# Patient Record
Sex: FEMALE | Race: WHITE | NOT HISPANIC OR LATINO | Employment: OTHER | URBAN - METROPOLITAN AREA
[De-identification: names, ages, dates, MRNs, and addresses within clinical notes are randomized per-mention and may not be internally consistent; named-entity substitution may affect disease eponyms.]

---

## 2017-02-28 ENCOUNTER — OFFICE VISIT (OUTPATIENT)
Dept: NEUROLOGY | Facility: MEDICAL CENTER | Age: 57
End: 2017-02-28
Payer: COMMERCIAL

## 2017-02-28 VITALS
SYSTOLIC BLOOD PRESSURE: 108 MMHG | HEART RATE: 82 BPM | BODY MASS INDEX: 21.01 KG/M2 | OXYGEN SATURATION: 97 % | TEMPERATURE: 99.1 F | HEIGHT: 60 IN | WEIGHT: 107 LBS | DIASTOLIC BLOOD PRESSURE: 62 MMHG | RESPIRATION RATE: 16 BRPM

## 2017-02-28 DIAGNOSIS — R25.1 TREMOR OF UNKNOWN ORIGIN: Primary | ICD-10-CM

## 2017-02-28 PROCEDURE — 99215 OFFICE O/P EST HI 40 MIN: CPT | Performed by: PSYCHIATRY & NEUROLOGY

## 2017-02-28 RX ORDER — GABAPENTIN 100 MG/1
100 CAPSULE ORAL 2 TIMES DAILY
COMMUNITY
End: 2017-04-25

## 2017-02-28 NOTE — MR AVS SNAPSHOT
Mariela Huff   2017 4:20 PM   Office Visit   MRN: [id number]    Department:  [facility]   Dept Phone:  [phone]    Description:  Female : 1960   Provider:  Austyn Calvillo M.D.           Reason for Visit     Follow-Up tremor/ Previous pt of Dr Mueller      Allergies as of 2017     Allergen Noted Reactions    Pcn [Penicillins] 2010   Rash    Percocet [Oxycodone-Acetaminophen] 2010   Vomiting      Vital Signs     Blood Pressure Pulse Temperature Respirations Height Weight    108/62 mmHg 82 37.3 °C (99.1 °F) 16 1.524 m (5') 48.535 kg (107 lb)    Body Mass Index Oxygen Saturation Smoking Status             20.90 kg/m2 97% Never Smoker          Basic Information     Date Of Birth Sex Race Ethnicity Preferred Language    1960 Female White Non- English      Your appointments     Sep 05, 2017 10:00 AM   Follow Up Visit with Austyn Calvillo M.D.   [facility] (--)    [address] [place] [address], [address]  [facility] [id number] [phone]           You will be receiving a confirmation call a few days before your appointment from our automated call confirmation system.              Problem List              ICD-10-CM Priority Class Noted - Resolved    Encounter for cosmetic surgery Z41.1   2016 - Present      Health Maintenance        Date Due Completion Dates    IMM DTaP/Tdap/Td Vaccine (1 - Tdap) 10/30/1980 ---    PAP SMEAR 10/30/1982 ---    COLONOSCOPY 10/30/2011 ---    IMM INFLUENZA (1) 2016 ---    MAMMOGRAM 3/11/2017 3/11/2016, 2015, 3/6/2015, 2015, 2014, 2013, 2011, 2011, 2007, 2007            Current Immunizations     No immunizations on file.      Below and/or attached are the medications your provider expects you to take. Review all of your home medications and newly ordered medications with your provider and/or pharmacist. Follow medication instructions as directed by your provider  and/or pharmacist. Please keep your medication list with you and share with your provider. Update the information when medications are discontinued, doses are changed, or new medications (including over-the-counter products) are added; and carry medication information at all times in the event of emergency situations     Allergies:  PCN - Rash     PERCOCET - Vomiting               Medications  Valid as of: February 28, 2017 -  4:58 PM    Generic Name Brand Name Tablet Size Instructions for use    B Complex Vitamins   Take  by mouth every day.        Cefuroxime Axetil (Tab) CEFTIN 500 MG Take 500 mg by mouth 2 times a day.        Cholecalciferol (Cap) Vitamin D 2000 UNITS Take 2,000 Caps by mouth every day.        Cyanocobalamin (SL Tab) Vitamin B-12 5000 MCG Place  under tongue every day.        Gabapentin (Cap) NEURONTIN 100 MG Take 100 mg by mouth 2 Times a Day.        Glutathione (Tab) Glutathione 50 MG Take 250 mg by mouth 2 Times a Day.        HYDROmorphone HCl (Tab) DILAUDID 2 MG Take 2-4 mg by mouth every 6 hours as needed for Severe Pain.        Ibuprofen (Tab) MOTRIN 200 MG Take 400 mg by mouth every 6 hours as needed.        Levothyroxine Sodium (Tab) SYNTHROID 88 MCG Take 88 mcg by mouth Every morning on an empty stomach.        Magnesium (Cap) Magnesium 400 MG Take  by mouth every day.        NON SPECIFIED   2 Times a Day. Glutathione, OTC supplement        Nystatin (Tab) MYCOSTATIN 949421 UNIT Take 1 Tab by mouth 2 Times a Day.        Nystatin   Take 50,000 Units by mouth every day. Nebulizer treatment        Succimer (Powder) Succimer DMSA  by Does not apply route.        .                 Medicines prescribed today were sent to:     [facility] #[id number] - [place] - [address] [address]    Phone: [phone] Fax: [phone]    Open 24 Hours?: No      Medication refill instructions:       If your prescription bottle indicates you have medication refills left, it is not necessary  to call your provider’s office. Please contact your pharmacy and they will refill your medication.    If your prescription bottle indicates you do not have any refills left, you may request refills at any time through one of the following ways: The online [employer] system (except Urgent Care), by calling your provider’s office, or by asking your pharmacy to contact your provider’s office with a refill request. Medication refills are processed only during regular business hours and may not be available until the next business day. Your provider may request additional information or to have a follow-up visit with you prior to refilling your medication.   *Please Note: Medication refills are assigned a new Rx number when refilled electronically. Your pharmacy may indicate that no refills were authorized even though a new prescription for the same medication is available at the pharmacy. Please request the medicine by name with the pharmacy before contacting your provider for a refill.           Mendor Access Code: Activation code not generated  Current Mendor Status: Active

## 2017-03-01 ASSESSMENT — ENCOUNTER SYMPTOMS
TREMORS: 1
MEMORY LOSS: 0
HEADACHES: 0
CONSTIPATION: 0
TINGLING: 0
FOCAL WEAKNESS: 0

## 2017-03-22 ENCOUNTER — HOSPITAL ENCOUNTER (OUTPATIENT)
Dept: LAB | Facility: MEDICAL CENTER | Age: 57
End: 2017-03-22
Attending: FAMILY MEDICINE
Payer: COMMERCIAL

## 2017-03-22 LAB
ALBUMIN SERPL BCP-MCNC: 4.3 G/DL (ref 3.2–4.9)
ALBUMIN/GLOB SERPL: 1.3 G/DL
ALP SERPL-CCNC: 76 U/L (ref 30–99)
ALT SERPL-CCNC: 23 U/L (ref 2–50)
ANION GAP SERPL CALC-SCNC: 6 MMOL/L (ref 0–11.9)
AST SERPL-CCNC: 23 U/L (ref 12–45)
BASOPHILS # BLD AUTO: 0.05 K/UL (ref 0–0.12)
BASOPHILS NFR BLD AUTO: 0.8 % (ref 0–1.8)
BILIRUB SERPL-MCNC: 0.3 MG/DL (ref 0.1–1.5)
BUN SERPL-MCNC: 15 MG/DL (ref 8–22)
CALCIUM SERPL-MCNC: 9.5 MG/DL (ref 8.5–10.5)
CHLORIDE SERPL-SCNC: 102 MMOL/L (ref 96–112)
CO2 SERPL-SCNC: 32 MMOL/L (ref 20–33)
CREAT SERPL-MCNC: 0.96 MG/DL (ref 0.5–1.4)
EOSINOPHIL # BLD: 0.17 K/UL (ref 0–0.51)
EOSINOPHIL NFR BLD AUTO: 2.6 % (ref 0–6.9)
ERYTHROCYTE [DISTWIDTH] IN BLOOD BY AUTOMATED COUNT: 40.2 FL (ref 35.9–50)
GLOBULIN SER CALC-MCNC: 3.2 G/DL (ref 1.9–3.5)
GLUCOSE SERPL-MCNC: 92 MG/DL (ref 65–99)
HCT VFR BLD AUTO: 44.5 % (ref 37–47)
HGB BLD-MCNC: 15.3 G/DL (ref 12–16)
IMM GRANULOCYTES # BLD AUTO: 0.01 K/UL (ref 0–0.11)
IMM GRANULOCYTES NFR BLD AUTO: 0.2 % (ref 0–0.9)
LYMPHOCYTES # BLD: 1.58 K/UL (ref 1–4.8)
LYMPHOCYTES NFR BLD AUTO: 24.3 % (ref 22–41)
MCH RBC QN AUTO: 31.4 PG (ref 27–33)
MCHC RBC AUTO-ENTMCNC: 34.4 G/DL (ref 33.6–35)
MCV RBC AUTO: 91.4 FL (ref 81.4–97.8)
MONOCYTES # BLD: 0.51 K/UL (ref 0–0.85)
MONOCYTES NFR BLD AUTO: 7.8 % (ref 0–13.4)
NEUTROPHILS # BLD: 4.19 K/UL (ref 2–7.15)
NEUTROPHILS NFR BLD AUTO: 64.3 % (ref 44–72)
NRBC # BLD AUTO: 0 K/UL
NRBC BLD-RTO: 0 /100 WBC
PLATELET # BLD AUTO: 211 K/UL (ref 164–446)
PMV BLD AUTO: 10.5 FL (ref 9–12.9)
POTASSIUM SERPL-SCNC: 4.3 MMOL/L (ref 3.6–5.5)
PROT SERPL-MCNC: 7.5 G/DL (ref 6–8.2)
RBC # BLD AUTO: 4.87 M/UL (ref 4.2–5.4)
SODIUM SERPL-SCNC: 140 MMOL/L (ref 135–145)
WBC # BLD AUTO: 6.5 K/UL (ref 4.8–10.8)

## 2017-03-22 PROCEDURE — 80053 COMPREHEN METABOLIC PANEL: CPT

## 2017-03-22 PROCEDURE — 85025 COMPLETE CBC W/AUTO DIFF WBC: CPT

## 2017-03-22 PROCEDURE — 36415 COLL VENOUS BLD VENIPUNCTURE: CPT

## 2017-03-31 ENCOUNTER — HOSPITAL ENCOUNTER (OUTPATIENT)
Dept: RADIOLOGY | Facility: MEDICAL CENTER | Age: 57
End: 2017-03-31
Attending: OBSTETRICS & GYNECOLOGY
Payer: COMMERCIAL

## 2017-03-31 DIAGNOSIS — R92.8 ABNORMAL MAMMOGRAM, UNSPECIFIED: ICD-10-CM

## 2017-03-31 PROCEDURE — G0204 DX MAMMO INCL CAD BI: HCPCS

## 2017-03-31 PROCEDURE — 76642 ULTRASOUND BREAST LIMITED: CPT | Mod: LT

## 2017-04-25 ENCOUNTER — APPOINTMENT (OUTPATIENT)
Dept: ADMISSIONS | Facility: MEDICAL CENTER | Age: 57
End: 2017-04-25
Attending: ORTHOPAEDIC SURGERY
Payer: COMMERCIAL

## 2017-05-01 ENCOUNTER — APPOINTMENT (OUTPATIENT)
Dept: RADIOLOGY | Facility: MEDICAL CENTER | Age: 57
End: 2017-05-01
Attending: ORTHOPAEDIC SURGERY
Payer: COMMERCIAL

## 2017-05-01 ENCOUNTER — HOSPITAL ENCOUNTER (OUTPATIENT)
Facility: MEDICAL CENTER | Age: 57
End: 2017-05-01
Attending: ORTHOPAEDIC SURGERY | Admitting: ORTHOPAEDIC SURGERY
Payer: COMMERCIAL

## 2017-05-01 VITALS
BODY MASS INDEX: 20.6 KG/M2 | HEIGHT: 60 IN | SYSTOLIC BLOOD PRESSURE: 130 MMHG | DIASTOLIC BLOOD PRESSURE: 74 MMHG | TEMPERATURE: 98.4 F | HEART RATE: 77 BPM | RESPIRATION RATE: 18 BRPM | OXYGEN SATURATION: 94 % | WEIGHT: 104.94 LBS

## 2017-05-01 PROBLEM — M20.11 ACQUIRED HALLUX VALGUS OF RIGHT FOOT: Status: ACTIVE | Noted: 2017-05-01

## 2017-05-01 PROCEDURE — 88304 TISSUE EXAM BY PATHOLOGIST: CPT

## 2017-05-01 PROCEDURE — 501838 HCHG SUTURE GENERAL: Performed by: ORTHOPAEDIC SURGERY

## 2017-05-01 PROCEDURE — 160022 HCHG BLOCK: Performed by: ORTHOPAEDIC SURGERY

## 2017-05-01 PROCEDURE — 500881 HCHG PACK, EXTREMITY: Performed by: ORTHOPAEDIC SURGERY

## 2017-05-01 PROCEDURE — 160002 HCHG RECOVERY MINUTES (STAT): Performed by: ORTHOPAEDIC SURGERY

## 2017-05-01 PROCEDURE — C1713 ANCHOR/SCREW BN/BN,TIS/BN: HCPCS | Performed by: ORTHOPAEDIC SURGERY

## 2017-05-01 PROCEDURE — A6223 GAUZE >16<=48 NO W/SAL W/O B: HCPCS | Performed by: ORTHOPAEDIC SURGERY

## 2017-05-01 PROCEDURE — 160035 HCHG PACU - 1ST 60 MINS PHASE I: Performed by: ORTHOPAEDIC SURGERY

## 2017-05-01 PROCEDURE — A9270 NON-COVERED ITEM OR SERVICE: HCPCS

## 2017-05-01 PROCEDURE — 160047 HCHG PACU  - EA ADDL 30 MINS PHASE II: Performed by: ORTHOPAEDIC SURGERY

## 2017-05-01 PROCEDURE — 500053 HCHG BANDAGE, ELASTIC 4: Performed by: ORTHOPAEDIC SURGERY

## 2017-05-01 PROCEDURE — 160025 RECOVERY II MINUTES (STATS): Performed by: ORTHOPAEDIC SURGERY

## 2017-05-01 PROCEDURE — 160028 HCHG SURGERY MINUTES - 1ST 30 MINS LEVEL 3: Performed by: ORTHOPAEDIC SURGERY

## 2017-05-01 PROCEDURE — 160039 HCHG SURGERY MINUTES - EA ADDL 1 MIN LEVEL 3: Performed by: ORTHOPAEDIC SURGERY

## 2017-05-01 PROCEDURE — 160048 HCHG OR STATISTICAL LEVEL 1-5: Performed by: ORTHOPAEDIC SURGERY

## 2017-05-01 PROCEDURE — 160046 HCHG PACU - 1ST 60 MINS PHASE II: Performed by: ORTHOPAEDIC SURGERY

## 2017-05-01 PROCEDURE — 700102 HCHG RX REV CODE 250 W/ 637 OVERRIDE(OP)

## 2017-05-01 PROCEDURE — 700111 HCHG RX REV CODE 636 W/ 250 OVERRIDE (IP)

## 2017-05-01 PROCEDURE — A6222 GAUZE <=16 IN NO W/SAL W/O B: HCPCS | Performed by: ORTHOPAEDIC SURGERY

## 2017-05-01 PROCEDURE — 501480 HCHG STOCKINETTE: Performed by: ORTHOPAEDIC SURGERY

## 2017-05-01 PROCEDURE — 502240 HCHG MISC OR SUPPLY RC 0272: Performed by: ORTHOPAEDIC SURGERY

## 2017-05-01 PROCEDURE — 500423 HCHG DRESSING, ABD COMBINE: Performed by: ORTHOPAEDIC SURGERY

## 2017-05-01 PROCEDURE — 160036 HCHG PACU - EA ADDL 30 MINS PHASE I: Performed by: ORTHOPAEDIC SURGERY

## 2017-05-01 PROCEDURE — 700101 HCHG RX REV CODE 250

## 2017-05-01 PROCEDURE — 160009 HCHG ANES TIME/MIN: Performed by: ORTHOPAEDIC SURGERY

## 2017-05-01 PROCEDURE — 73620 X-RAY EXAM OF FOOT: CPT | Mod: RT

## 2017-05-01 DEVICE — IMPLANTABLE DEVICE: Type: IMPLANTABLE DEVICE | Status: FUNCTIONAL

## 2017-05-01 RX ORDER — MAGNESIUM HYDROXIDE 1200 MG/15ML
LIQUID ORAL
Status: DISCONTINUED | OUTPATIENT
Start: 2017-05-01 | End: 2017-05-01 | Stop reason: HOSPADM

## 2017-05-01 RX ORDER — LIDOCAINE HYDROCHLORIDE 10 MG/ML
INJECTION, SOLUTION INFILTRATION; PERINEURAL
Status: COMPLETED
Start: 2017-05-01 | End: 2017-05-01

## 2017-05-01 RX ORDER — SCOLOPAMINE TRANSDERMAL SYSTEM 1 MG/1
PATCH, EXTENDED RELEASE TRANSDERMAL
Status: COMPLETED
Start: 2017-05-01 | End: 2017-05-01

## 2017-05-01 RX ORDER — SODIUM CHLORIDE, SODIUM LACTATE, POTASSIUM CHLORIDE, CALCIUM CHLORIDE 600; 310; 30; 20 MG/100ML; MG/100ML; MG/100ML; MG/100ML
1000 INJECTION, SOLUTION INTRAVENOUS
Status: DISCONTINUED | OUTPATIENT
Start: 2017-05-01 | End: 2017-05-01 | Stop reason: HOSPADM

## 2017-05-01 RX ADMIN — SCOPALAMINE 1 PATCH: 1 PATCH, EXTENDED RELEASE TRANSDERMAL at 07:30

## 2017-05-01 RX ADMIN — HYDROCODONE BITARTRATE AND ACETAMINOPHEN 15 ML: 2.5; 108 SOLUTION ORAL at 09:20

## 2017-05-01 ASSESSMENT — PAIN SCALES - GENERAL
PAINLEVEL_OUTOF10: 0
PAINLEVEL_OUTOF10: 3
PAINLEVEL_OUTOF10: 0
PAINLEVEL_OUTOF10: 1
PAINLEVEL_OUTOF10: 0
PAINLEVEL_OUTOF10: 3
PAINLEVEL_OUTOF10: 3

## 2017-05-01 NOTE — OP REPORT
DATE OF SERVICE:  05/01/2017    DATE OF OPERATION:  05/01/2017    PREOPERATIVE DIAGNOSES:  1.  Right hallux valgus deformity.  2.  Right second webspace neuroma.    POSTOPERATIVE DIAGNOSES:  1.  Right hallux valgus deformity.  2.  Right second webspace neuroma.    PROCEDURES PERFORMED:  1.  Right proximal hallux valgus reconstruction with bone grafting.  2.  Right second webspace neuroma excision.    SURGEON:  Janes Benaviedz MD    FIRST ASSISTANT:  Helena Marquez MD    SECOND ASSISTANT:  Nara Macedo    ANESTHESIA:  General endotracheal with popliteal block per my request for   postoperative pain management.    ESTIMATED BLOOD LOSS:  None.    COMPLICATIONS:  None.    POSTOPERATIVE PLAN:  1.  Heel-touch weightbearing.  2.  Perioperative antibiotics.  3.  Follow up in 2 weeks.    INDICATIONS:  The patient is a pleasant 56-year-old female who has had   problems with her right foot.  The above diagnosis made and options were   discussed including operative and nonoperative.  She elected to undergo   operative intervention.  The above procedure discussed and all questions were   answered.  Risks of surgery were explained to include but not limited to wound   problems, infection, nerve injury, vascular injury, need for further surgery.    She understands she could have persistent risk for pain, malunion, nonunion,   joint stiffness, overcorrection, under correction, recurrence, and need for   further surgery.  She understands and accepts these risks and agrees to   proceed.  Her site was marked by myself prior to receiving psychotropic   medicines.    PROCEDURE IN DETAIL:  The patient was given a popliteal block per my request   for postoperative pain management.  She was brought to the operating room and   underwent general endotracheal anesthesia without complications.  Right lower   extremity was prepped and draped in standard fashion.  Positive site   verification confirmed her right lower extremity as well  as the above   procedure and confirmation that she received preoperative antibiotics.    Esmarch was used to exsanguinate her foot and ankle and leg, and tourniquet   was inflated to 250 mmHg.  A dorsal incision was made over the first webspace   just lateral to the first TMT joint.  It was dissected down along the lateral   capsule.  Using a 15 blade, the metatarsal sesamoid ligament was released   followed by the abductor tendon off of the fibular sesamoid.  [place] was placed   just deep to the intermetatarsal ligament.  This was released protecting the   underlying neurovascular structures.  Taking the toe into varus, I went into   approximately 5 degrees of varus easily.  Next, an incision was made over the   medial eminence.  It was dissected down to the capsule.  Careful dissection   was made dorsal and plantar.  A wedge of capsule was taken off the level of   the first TMT on the medial side.  The medial capsule was then carefully   elevated off.  A microsagittal saw was used to remove the medial eminence.    Dissection was then made proximal to the level of the proximal aspect of the   metatarsal where supraperiosteal dissection was made dorsal and plantar.    Under C-arm imaging, an osteotomy was made with a 0.38 blade parallel to the   first TMT joint going to, but not through the lateral cortex.  This was then   opened up with a distractor.  Under C-arm imaging, the intermetatarsal angle   was neutralized, it was measured and a 6 mm Arthrex opening wedge plate was   placed.  It was intentionally placed on the right foot to avoid prominence of   the plate.  It was then transfixed with 2 screws proximal and distal.  Stab   incision was made over the calcaneus laterally just posterior to the peroneal   tendons, dissected down to the level of the calcaneus.  Using a 4.5 drill   sleeve, multiple cores of bone were taken and these were placed into the   opening wedge side of the first metatarsal.  Capsule was  then repaired with   multiple 0 Vicryls in a mattress-type fashion.  C-arm imaging demonstrates   satisfactory position for internal fixation, neutralization of the hallux   valgus in the intermetatarsal angle, and the sesamoids were reduced underneath   the metatarsal head.  Next, a dorsal incision was made over the second   webspace.  It was dissected down.  A freer was placed just deep to the   intermetatarsal ligament, which was released.  This identified the neuroma.    This was traced as proximal as possible and cut well into the intrinsics, then   traced distally where it was also cut.  Wounds were irrigated with copious   irrigation and closed in layered fashion using 3-0 Vicryl and 3-0 nylon.    Sterile dressings were applied.  Tourniquet was released.  All toes were pink.    She was placed in a standard bunion dressing.  She was transferred to the   recovery room in good condition.    Utilization of Dr. Marquez was necessary for patient positioning, holding,   retracting, provisional and definitive fixation, wound closure, dressing   placement.  She was present throughout the entire procedure.       ____________________________________     MD GRAEME HOOPER / ANA    DD:  05/01/2017 08:44:24  DT:  05/01/2017 10:43:04    D#:  [id number]  Job#:  [id number]    cc: [facility]

## 2017-05-01 NOTE — OR NURSING
IV attempt X1 unsuccessful. Pt requests anesthesiologist to start IV.  Will notify Dr. Naidu upon his arrival.

## 2017-05-01 NOTE — DISCHARGE INSTRUCTIONS
ACTIVITY: Rest and take it easy for the first 24 hours.  A responsible adult is recommended to remain with you during that time.  It is normal to feel sleepy.  We encourage you to not do anything that requires balance, judgment or coordination.    MILD FLU-LIKE SYMPTOMS ARE NORMAL. YOU MAY EXPERIENCE GENERALIZED MUSCLE ACHES, THROAT IRRITATION, HEADACHE AND/OR SOME NAUSEA.    FOR 24 HOURS DO NOT:  Drive, operate machinery or run household appliances.  Drink beer or alcoholic beverages.   Make important decisions or sign legal documents.    SPECIAL INSTRUCTIONS: *Keep dressing clean, dry and in place. **    DIET: To avoid nausea, slowly advance diet as tolerated, avoiding spicy or greasy foods for the first day.  Add more substantial food to your diet according to your physician's instructions.  Babies can be fed formula or breast milk as soon as they are hungry.  INCREASE FLUIDS AND FIBER TO AVOID CONSTIPATION.    SURGICAL DRESSING/BATHING: *    Take pain medications scheduled until block wears off.  Hold for sedation.  Elevate foot, apply ice regularly (20min on, 20min off)  Rewrap ace to be looser, if needed, 6-8 hours post operatively (after 4pm)  Heel Weight Bearing in post op shoe       FOLLOW-UP APPOINTMENT:  A follow-up appointment should be arranged with your doctor in *7-10 days or as directed by your doctor**; call to schedule.    You should CALL YOUR PHYSICIAN if you develop:  Fever greater than 101 degrees F.  Pain not relieved by medication, or persistent nausea or vomiting.  Excessive bleeding (blood soaking through dressing) or unexpected drainage from the wound.  Extreme redness or swelling around the incision site, drainage of pus or foul smelling drainage.  Inability to urinate or empty your bladder within 8 hours.  Problems with breathing or chest pain.    You should call 911 if you develop problems with breathing or chest pain.  If you are unable to contact your doctor or surgical center, you  should go to the nearest emergency room or urgent care center.  Physician's telephone #: *Dr. Benavidez [phone]*    If any questions arise, call your doctor.  If your doctor is not available, please feel free to call the Surgical Center at [phone].  The Center is open Monday through Friday from 7AM to 7PM.  You can also call the HEALTH HOTLINE open 24 hours/day, 7 days/week and speak to a nurse at [phone], or toll free at [phone].    A registered nurse may call you a few days after your surgery to see how you are doing after your procedure.    MEDICATIONS: Resume taking daily medication.  Take prescribed pain medication with food.  If no medication is prescribed, you may take non-aspirin pain medication if needed.  PAIN MEDICATION CAN BE VERY CONSTIPATING.  Take a stool softener or laxative such as senokot, pericolace, or milk of magnesia if needed.    Prescription given *to *.  Last oral pain medication given at *9:20am*.    If your physician has prescribed pain medication that includes Acetaminophen (Tylenol), do not take additional Acetaminophen (Tylenol) while taking the prescribed medication.    Depression / Suicide Risk    As you are discharged from this [facility] facility, it is important to learn how to keep safe from harming yourself.    Recognize the warning signs:  · Abrupt changes in personality, positive or negative- including increase in energy   · Giving away possessions  · Change in eating patterns- significant weight changes-  positive or negative  · Change in sleeping patterns- unable to sleep or sleeping all the time   · Unwillingness or inability to communicate  · Depression  · Unusual sadness, discouragement and loneliness  · Talk of wanting to die  · Neglect of personal appearance   · Rebelliousness- reckless behavior  · Withdrawal from people/activities they love  · Confusion- inability to concentrate     If you or a loved one observes any of these behaviors  or has concerns about self-harm, here's what you can do:  · Talk about it- your feelings and reasons for harming yourself  · Remove any means that you might use to hurt yourself (examples: pills, rope, extension cords, firearm)  · Get professional help from the community (Mental Health, Substance Abuse, psychological counseling)  · Do not be alone:Call your Safe Contact- someone whom you trust who will be there for you.  · Call your local CRISIS HOTLINE 352-2857 or 994-364-2330  · Call your local Children's Mobile Crisis Response Team [place] (292) 744-0673 or www.[employer]  · Call the toll free National Suicide Prevention Hotlines   · National Suicide Prevention Lifeline 115-043-WKSI (0892)  · National Hope Line Network 800-SUICIDE (225-4727)

## 2017-05-01 NOTE — IP AVS SNAPSHOT
5/1/2017    Mariela Huff  [address]  [place] [address]    Dear Mariela:    [facility] wants to ensure your discharge home is safe and you or your loved ones have had all of your questions answered regarding your care after you leave the hospital.    Below is a list of resources and contact information should you have any questions regarding your hospital stay, follow-up instructions, or active medical symptoms.    Questions or Concerns Regarding… Contact   Medical Questions Related to Your Discharge  (7 days a week, 8am-5pm) Contact a Nurse Care Coordinator   [phone]   Medical Questions Not Related to Your Discharge  (24 hours a day / 7 days a week)  Contact the Nurse Health Line   [phone]    Medications or Discharge Instructions Refer to your discharge packet   or contact your [facility] Primary Care Provider   [phone]   Follow-up Appointment(s) Schedule your appointment via [employer]   or contact Scheduling [phone]   Billing Review your statement via [employer]  or contact Billing [phone]   Medical Records Review your records via [employer]   or contact Medical Records [phone]     You may receive a telephone call within two days of discharge. This call is to make certain you understand your discharge instructions and have the opportunity to have any questions answered. You can also easily access your medical information, test results and upcoming appointments via the [employer] free online health management tool. You can learn more and sign up at [employer]. For assistance setting up your [employer] account, please call [phone].    Once again, we want to ensure your discharge home is safe and that you have a clear understanding of any next steps in your care. If you have any questions or concerns, please do not hesitate to contact us, we are here for you. Thank you for choosing [facility] for your healthcare needs.    Sincerely,    Your [facility] Healthcare Team

## 2017-05-01 NOTE — IP AVS SNAPSHOT
Home Care Instructions                                                                                                                Name:Mariela Huff  Medical Record Number:[id number]  CSN: [id number]    YOB: 1960   Age: 56 y.o.  Sex: female  HT:1.524 m (5') WT: 47.6 kg (104 lb 15 oz)          Admit Date: 5/1/2017     Discharge Date:   Today's Date: 5/1/2017  Attending Doctor:  Janes Benavidez M.D.                  Allergies:  Pcn and Percocet              Follow-up Information     1. Follow up with Janes Benavidez M.D. In 2 weeks.    Specialty:  Orthopaedics    Contact information    [address]  [address]  [place] [address]  [phone]          Discharge Instructions         ACTIVITY: Rest and take it easy for the first 24 hours.  A responsible adult is recommended to remain with you during that time.  It is normal to feel sleepy.  We encourage you to not do anything that requires balance, judgment or coordination.    MILD FLU-LIKE SYMPTOMS ARE NORMAL. YOU MAY EXPERIENCE GENERALIZED MUSCLE ACHES, THROAT IRRITATION, HEADACHE AND/OR SOME NAUSEA.    FOR 24 HOURS DO NOT:  Drive, operate machinery or run household appliances.  Drink beer or alcoholic beverages.   Make important decisions or sign legal documents.    SPECIAL INSTRUCTIONS: *Keep dressing clean, dry and in place. **    DIET: To avoid nausea, slowly advance diet as tolerated, avoiding spicy or greasy foods for the first day.  Add more substantial food to your diet according to your physician's instructions.  Babies can be fed formula or breast milk as soon as they are hungry.  INCREASE FLUIDS AND FIBER TO AVOID CONSTIPATION.    SURGICAL DRESSING/BATHING: *    Take pain medications scheduled until block wears off.  Hold for sedation.  Elevate foot, apply ice regularly (20min on, 20min off)  Rewrap ace to be looser, if needed, 6-8 hours post operatively (after 4pm)  Heel Weight Bearing in post op shoe       FOLLOW-UP APPOINTMENT:  A  follow-up appointment should be arranged with your doctor in *7-10 days or as directed by your doctor**; call to schedule.    You should CALL YOUR PHYSICIAN if you develop:  Fever greater than 101 degrees F.  Pain not relieved by medication, or persistent nausea or vomiting.  Excessive bleeding (blood soaking through dressing) or unexpected drainage from the wound.  Extreme redness or swelling around the incision site, drainage of pus or foul smelling drainage.  Inability to urinate or empty your bladder within 8 hours.  Problems with breathing or chest pain.    You should call 911 if you develop problems with breathing or chest pain.  If you are unable to contact your doctor or surgical center, you should go to the nearest emergency room or urgent care center.  Physician's telephone #: *Dr. Benavidez [phone]*    If any questions arise, call your doctor.  If your doctor is not available, please feel free to call the Surgical Center at [phone].  The Center is open Monday through Friday from 7AM to 7PM.  You can also call the [facility] HOTLINE open 24 hours/day, 7 days/week and speak to a nurse at [phone], or toll free at [phone].    A registered nurse may call you a few days after your surgery to see how you are doing after your procedure.    MEDICATIONS: Resume taking daily medication.  Take prescribed pain medication with food.  If no medication is prescribed, you may take non-aspirin pain medication if needed.  PAIN MEDICATION CAN BE VERY CONSTIPATING.  Take a stool softener or laxative such as senokot, pericolace, or milk of magnesia if needed.    Prescription given *to *.  Last oral pain medication given at *9:20am*.    If your physician has prescribed pain medication that includes Acetaminophen (Tylenol), do not take additional Acetaminophen (Tylenol) while taking the prescribed medication.    Depression / Suicide Risk    As you are discharged from this Critical access hospital facility, it is  important to learn how to keep safe from harming yourself.    Recognize the warning signs:  · Abrupt changes in personality, positive or negative- including increase in energy   · Giving away possessions  · Change in eating patterns- significant weight changes-  positive or negative  · Change in sleeping patterns- unable to sleep or sleeping all the time   · Unwillingness or inability to communicate  · Depression  · Unusual sadness, discouragement and loneliness  · Talk of wanting to die  · Neglect of personal appearance   · Rebelliousness- reckless behavior  · Withdrawal from people/activities they love  · Confusion- inability to concentrate     If you or a loved one observes any of these behaviors or has concerns about self-harm, here's what you can do:  · Talk about it- your feelings and reasons for harming yourself  · Remove any means that you might use to hurt yourself (examples: pills, rope, extension cords, firearm)  · Get professional help from the community (Mental Health, Substance Abuse, psychological counseling)  · Do not be alone:Call your Safe Contact- someone whom you trust who will be there for you.  · Call your local CRISIS HOTLINE 426-6034 or 694-196-7946  · Call your local Children's Mobile Crisis Response Team [place] (021) 788-0513 or www.[employer]  · Call the toll free National Suicide Prevention Hotlines   · National Suicide Prevention Lifeline 642-443-JICZ (5861)  · National Hope Line Network 800-SUICIDE (440-5008)       Medication List      CONTINUE taking these medications        Instructions    Morning Afternoon Evening Bedtime    levothyroxine 88 MCG Tabs   Commonly known as:  SYNTHROID        Take 88 mcg by mouth Every morning on an empty stomach.   Dose:  88 mcg                        Succimer DMSA Powd        Take 3 Caps by mouth See Admin Instructions. Three days on, eleven days off.   Dose:  3 Cap                                Medication Information     Above and/or  attached are the medications your physician expects you to take upon discharge. Review all of your home medications and newly ordered medications with your doctor and/or pharmacist. Follow medication instructions as directed by your doctor and/or pharmacist. Please keep your medication list with you and share with your physician. Update the information when medications are discontinued, doses are changed, or new medications (including over-the-counter products) are added; and carry medication information at all times in the event of emergency situations.        Resources     Quit Smoking / Tobacco Use:    I understand the use of any tobacco products increases my chance of suffering from future heart disease or stroke and could cause other illnesses which may shorten my life. Quitting the use of tobacco products is the single most important thing I can do to improve my health. For further information on smoking / tobacco cessation call a Toll Free Quit Line at 1-671.346.6085 (*National Cancer Ethel) or 1-810.737.4344 (American Lung Association) or you can access the web based program at www.lungBorqs.org.    [place] Tobacco Users Help Line:  (700) 792-9534       Toll Free: 1-976.257.8576  Quit Tobacco Program [facility] (911)552-1380    Crisis Hotline:    [place] Crisis Hotline:  2-546-DDSGICK or 1-869.104.2404    [place] Crisis Hotline:    1-489.992.9368 or 241-385-7952    Discharge Survey:   Thank you for choosing [facility]. We hope we did everything we could to make your hospital stay a pleasant one. You may be receiving a survey and we would appreciate your time and participation in answering the questions. Your input is very valuable to us in our efforts to improve our service to our patients and their families.            Signatures     My signature on this form indicates that:    1. I acknowledge receipt and understanding of these Home Care Instruction.  2. My questions regarding  this information have been answered to my satisfaction.  3. I have formulated a plan with my discharge nurse to obtain my prescribed medications for home.    __________________________________      __________________________________                   Patient Signature                                 Guardian/Responsible Adult Signature      __________________________________                 __________       ________                       Nurse Signature                                               Date                 Time

## 2017-09-05 ENCOUNTER — OFFICE VISIT (OUTPATIENT)
Dept: NEUROLOGY | Facility: MEDICAL CENTER | Age: 57
End: 2017-09-05
Payer: COMMERCIAL

## 2017-09-05 VITALS
SYSTOLIC BLOOD PRESSURE: 118 MMHG | WEIGHT: 106.6 LBS | TEMPERATURE: 98.2 F | OXYGEN SATURATION: 97 % | BODY MASS INDEX: 20.93 KG/M2 | DIASTOLIC BLOOD PRESSURE: 72 MMHG | HEART RATE: 78 BPM | HEIGHT: 60 IN | RESPIRATION RATE: 17 BRPM

## 2017-09-05 DIAGNOSIS — R25.1 TREMOR OF UNKNOWN ORIGIN: ICD-10-CM

## 2017-09-05 PROCEDURE — 99214 OFFICE O/P EST MOD 30 MIN: CPT | Performed by: PSYCHIATRY & NEUROLOGY

## 2017-09-05 ASSESSMENT — PATIENT HEALTH QUESTIONNAIRE - PHQ9: CLINICAL INTERPRETATION OF PHQ2 SCORE: 0

## 2017-09-05 ASSESSMENT — ENCOUNTER SYMPTOMS
TREMORS: 1
FALLS: 0
HEADACHES: 0
MEMORY LOSS: 0

## 2017-09-05 ASSESSMENT — PAIN SCALES - GENERAL: PAINLEVEL: NO PAIN

## 2017-09-05 NOTE — PROGRESS NOTES
Subjective:      Mariela Huff is a 56 y.o. female who presents For follow-up with a history of a mixed tremor.    HPI    Since last seen, we had not treated the tremulousness, simply observing. It is still for the most part right-sided, she notes difficulty with the hand, a decrement in hand writing. The tremulousness seems to be controllable if she grasps the object firmly enough, fine motor control still most problematic. When she walks the right foot seems to be more flat-footed, she denies an actual foot slap when she does so. The longer she walks, the right foot seems to cramp as well. Constipation is not an issue, she denies loss of smell. I movements are intact. She is not drooling. She swallows easily.     She does describe symptoms of RLS in the legs, right more than left. Always worse in the evenings, especially when she is immobile, these seem to improve when she stands and walks or simply moves the right lower extremity. She is been suffering from these symptoms for a while, they fluctuate.    There is a distant family history and a second degree relative of tremor, no one in the family with a history of neurodegenerative disease. Other than some Chinese and homeopathic medications, she takes Synthroid. Medical, surgical, and family histories are otherwise reviewed, there are no new drug allergies.    Review of Systems   Constitutional: Negative for malaise/fatigue.   Musculoskeletal: Negative for falls.   Neurological: Positive for tremors. Negative for headaches.   Psychiatric/Behavioral: Negative for memory loss.        Objective:     /72   Pulse 78   Temp 36.8 °C (98.2 °F)   Resp 17   Ht 1.524 m (5')   Wt 48.4 kg (106 lb 9.6 oz)   SpO2 97%   BMI 20.82 kg/m²      Physical Exam    She appears in no acute distress. Vital signs are stable. There is no malar rash, the neck is supple, range of motion is full. There is no sialorrhea or evidence of excessive drooling. Cardiac  evaluation is unremarkable. There is no lower extremity edema.    She is fully oriented, there is no aphasia or inattention. There is a mild reduction in eye blink frequency, right side of the face droops slightly, there may be a little hypophonia, certainly no dysarthria or tachyphemia. There is no tremulousness of the head, neck, or voice. Fine low amplitude high-frequency tremulousness is seen on the right side, there is rigidity still and bradykinesia with the right upper and lower extremity. There is no appendicular dystaxia. Repetitive movements with both hand show diminished amplitude, again asymmetrically represented with the right. When she walks, stride length is not diminished, there is some stiffness with ankle dorsiflexion on the right, but she does not shuffle. Arm swing on the right is slightly diminished when compared to the left.     Assessment/Plan:     1. Tremor of unknown origin  Still a mixed bag, I am suspicious this may turn out to be Parkinson's disease, she lacks the history and associated symptoms suggestive of a Parkinson's-plus syndrome. Essential tremor also could be an issue. She is thinking about a referral to movement disorder center, I certainly have no objection to making that. The RLS symptoms are an interesting comorbidity, often seen in people with Parkinson's disease. The same goes for an essential tremor. In either case, the same medicines used for RLS such as dopamine agonists, certainly can be effective in Parkinson's disease. I will follow-up with her in one year, she will let me know if she wants a referral or if she would like to try some medication. She understands that a positive response to these medicines is always supportive in establishing Parkinson's disease or a Parkinson's-plus syndrome, the latter often responding when the disease is milder in severity.    Time: Evaluation of 35 minutes for exam, review, discussion, and education  Discussion: As mentioned in the  assessment, over 60% of the time spent face-to-face counseling and coordinating care

## 2017-09-11 ENCOUNTER — TELEPHONE (OUTPATIENT)
Dept: NEUROLOGY | Facility: MEDICAL CENTER | Age: 57
End: 2017-09-11

## 2017-09-11 DIAGNOSIS — R25.1 TREMOR OF UNKNOWN ORIGIN: ICD-10-CM

## 2017-09-11 NOTE — TELEPHONE ENCOUNTER
Patient called for Dr. Calvillo and would like to proceed with the referral to the [facility].

## 2018-01-16 ENCOUNTER — TELEPHONE (OUTPATIENT)
Dept: NEUROLOGY | Facility: MEDICAL CENTER | Age: 58
End: 2018-01-16

## 2018-01-17 NOTE — PROGRESS NOTES
Patient is resting comfortably.   Subjective:      Mariela Huff is a 56 y.o. female who presents for follow-up, former patient of Dr. Mueller, originally seeing him for diffuse pains and dysesthesias, now with tremulousness.    HPI    Ms. Huff has actually had a good response with her primary complaints when seen by Dr. Mueller, diagnosed with Lyme disease, treated appropriately. She still only has some minimal skin sensitivity, she notes with cold temperature, she feels even more chilled than others. She denies blanching of fingertips or toes consistent with Raynaud's phenomena. There is no actual pain.    She has noted tremulousness in both the right hand and lower extremity developing more slowly and indolently. She seems to be more aware of this when she is using the extremity, she has not noted much at rest. Her balance is not curtailed. If she sits in certain positions, such as with the right leg crossed over the left, the extremity also feels tremulous. She denies any involvement of the head, neck, or voice, there is no internalized sense of tremulousness. With this, she notes her handwriting is getting smaller, not necessarily sloppier. It is slower to use the hand when writing. She denies overall loss of strength or dexterity with the right hand and arm.    She denies any meaningful cognitive deficits, she can still multitask, mental processing is not impaired. There is no change in voice volume or quality, there are no issues with swallowing or excessive salivation and drooling. There are no sleep issues, she denies any problems with bad nightmares, awakenings with associated anxiety, etc. Strength is intact otherwise. Movements in general are still easy and fluid. Walking is not impaired, the right leg does not drag, stride length is not diminished. There are no issues with loss of taste or smell or excessive constipation. Energy level in general is quite good. She drinks occasionally, has not noted any effect on the tremor one  way or the other.    Medical, surgical and family histories are reviewed in the electronic health record, there are no new drug allergies. Her father's mother did suffer from tremor. No one else in the family that she is aware of has suffered from neurodegenerative disease, myelinating disease or seizures. Other than her Synthroid, she was placed on Neurontin 100 mg, twice a day, for her nerve pain symptoms related to Lyme, this has had no effect on the tremor. She is also on several vitamin supplements including B complex, vitamin D and calcium.    Review of Systems   Constitutional: Positive for malaise/fatigue.   Gastrointestinal: Negative for constipation.   Genitourinary: Negative for frequency.   Neurological: Positive for tremors. Negative for tingling, focal weakness and headaches.   Psychiatric/Behavioral: Negative for memory loss.   All other systems reviewed and are negative.       Objective:     /62 mmHg  Pulse 82  Temp(Src) 37.3 °C (99.1 °F)  Resp 16  Ht 1.524 m (5')  Wt 48.535 kg (107 lb)  BMI 20.90 kg/m2  SpO2 97%     Physical Exam    She appears in no acute distress. Her vital signs are stable. There is no malar rash, jaw or temporal tenderness, or sialorrhea. The neck is supple. Cardiac evaluation reveals a regular rhythm.    She is fully oriented, there is no aphasia, apraxia, or inattention. PERRLA/EOMI, visual fields are full to finger counting, facial movements are symmetric, they see maybe a little slower on the right side. Spontaneous eye blink frequency is slightly diminished, there is a mild reduction in voice volume, spontaneous facial movements overall are diminished. There is no sensory loss across the midline, the jaw jerk is absent, the tongue and uvula are midline.    Motor exam does reveal some bradykinesia and even hypokinesia with movements using the right arm, hand and the right lower extremity. There is some mild rigidity, only with distraction, on the right side.  Fine tremulousness with low amplitude high frequency can be seen with the right hand when held against gravity, this attenuates at rest. Strength is intact throughout. Reflexes are symmetric, both toes are downgoing. She stands easily, the arm swing on the right is diminished, the right leg is only slightly slower with initiation of gait. She does not freeze. There is no appendicular dystaxia. Handwriting is small, even spiral copying, though even, does demonstrate reduction in overall diameter. Repetitive movement with the right hand does show slight hypokinesia. Movements are normal and more fluid with the left hand and foot. There is no postural instability. Sensory exam is intact to temperature and light touch.     Assessment/Plan:     1. Tremor of unknown origin  I suspect that this will turn out to be tremor induced by extrapyramidal dysfunction, the signs suggest a parkinsonian-like state, but everything is so mild it is still difficult to discern whether this is primary Parkinson's disease versus Parkinson's-plus. Though there is a family history of tremor, there is nothing to suggest Cory's disease, ischemia, vasculitis, rheumatologic disease, etc. There is an action component to this disorder, so it is probably worthwhile to see if there is any benefit with alcohol. There is a higher concordance of people with Parkinson's disease also suffering from essential tremor, though familial tremor is a different entity entirely. For now all we really can do is observe. Fortunately, she is not showing any other, protean signs of an alpha-syneucleinopathy such as anosmia, constipation, REM sleep disorder, etc. This is not medication effect, I doubt her thyroid condition has regressed.    Face-to-face time was spent at length talk with her about the nature of tremor, the different possibilities, my suspicions at this time, the rationale for having a drink, etc. I don't think additional diagnostics are required at  this time, but depending on how things evolve over a longer interval, will help determine if anything is necessary. I will follow-up with her in 6 months.    Time: Evaluation 40 minutes for exam come review, discussion, and education  Discussion: As mentioned in the assessment, over 60% of the time spent face-to-face counseling and coordinating care

## 2018-01-24 ENCOUNTER — OFFICE VISIT (OUTPATIENT)
Dept: INTERNAL MEDICINE | Facility: MEDICAL CENTER | Age: 58
End: 2018-01-24
Payer: COMMERCIAL

## 2018-01-24 VITALS
DIASTOLIC BLOOD PRESSURE: 73 MMHG | HEART RATE: 96 BPM | BODY MASS INDEX: 21.6 KG/M2 | OXYGEN SATURATION: 95 % | WEIGHT: 110 LBS | TEMPERATURE: 98.8 F | SYSTOLIC BLOOD PRESSURE: 109 MMHG | HEIGHT: 60 IN

## 2018-01-24 DIAGNOSIS — G20.A1 PARKINSON DISEASE: ICD-10-CM

## 2018-01-24 DIAGNOSIS — N95.1 HOT FLASHES DUE TO MENOPAUSE: ICD-10-CM

## 2018-01-24 DIAGNOSIS — Z86.19 HX OF LYME DISEASE: ICD-10-CM

## 2018-01-24 DIAGNOSIS — M25.50 ARTHRALGIA, UNSPECIFIED JOINT: ICD-10-CM

## 2018-01-24 DIAGNOSIS — E03.9 HYPOTHYROIDISM, UNSPECIFIED TYPE: ICD-10-CM

## 2018-01-24 PROCEDURE — 99214 OFFICE O/P EST MOD 30 MIN: CPT | Mod: GC | Performed by: INTERNAL MEDICINE

## 2018-01-24 ASSESSMENT — PAIN SCALES - GENERAL: PAINLEVEL: 3=SLIGHT PAIN

## 2018-01-24 NOTE — PROGRESS NOTES
"New Patient to Establish with Primary Care Track  (Prev. In [facility] system, but new to this office).    Reason to establish: New patient to establish    CC: hypothyroid / Hot flashes.    HPI:   Mariela Huff is a 57 y.o. female  here to establish care, as well as address issues of hot flashes, and multiple past/current issues addressed separately below.    Hot Flashes  Patient has a hysterectomy, previously, however they left her ovaries and that time. States that she has only recently begun having mild episodes of hot flashes and very mild mood changes. Generally, and has not been too much of a problem, but since she is here she is somewhat concerned about the hot flashes and would like this addressed.  She states that she does not smoke, has no history of breast cancer (but with a past breast biopsy, but that was negative), no past migraines, and no past clotting problems.    Parkinson's / PD.  Mostly right sided tremor, that is predominantly on the right side.  Had for several years, but finally (\"officially\") diagnosed as Parkinson's at [facility], 12/2017.  (They apparently injected a tracer, and did further imaging, that indicated decreased dopamine).   They did not start medications at this time, as they left it up to the patient, she chooses not to start at this time.   Also follows with Dr. Cobso, (neurology), and will discuss with him, in future.    Past Lyme Dz  (as well as Babasia) - per the patient's history  Started in 2010, with general body/spine aches, and heaviness in feet.  Also complained of diarrhea and gas - at the time of onset, but not since.  Diagnosed by naturopath, then a specialist in [place], with Dx in 2014.  who treated with Doxy then combination of other antibiotics (for a couple years), for Lyme.   Also noted that she also had Atovaquone, for Babasia.   She states that she still has some residual problems including right sided tremor and weakness, but this also " overlaps with the diagnosis of PD.     Hypothyroidism  Dx about 25 years ago, without any unusual or abnormal findings (no nodules).   Taking Synthroid 88, but no recent labs. (Likely over a year.... And outside location).   No recent symptoms of low thyroid, but with tremor, but noted to have PD.      Heavy Metals ?  Went to integrative medicine doctor in [place], and told she had mildly elevated and initially treated with oral succimer, but then stopped.   She states she is now seeing Dr. Nikolas Mario ([facility]), and told she had elevated mercury on urine testing.   She notes this was never documented in blood testing.   They are planning to use IV EDTA chelation, for this.     Arthralgias  Patient is not she has mild and vague muscle and joint pains that are generally intermittent.  Predominantly, and the right shoulder, with excess arm use, she notes a little muscular pulling sensation in the posterior aspect of her glenohumeral joint on the right.  She also notes some mild hip and knee pain intermittently, but not currently.  She has also noted some pain in the feet/legs, after long walking, but has previously been told that this may be related to her Parkinson's.  (She has also had past surgery on the right MTP/bunion, as well as neuroma excision on the right foot.)      Review of Symptoms  GEN/CONST:   Denies fever, fatigue, weakness, or significant weight change.   H/E:     Denies blurry vision or eye pain.  ENT:   Denies sinus pain, sore throat, ear pain, difficulty hearing, or tinnitus.  CARDIO:   Denies chest pain, palpitations, orthopnea, or edema.  RESP:   Denies shortness of breath, wheezing, or coughing.  GI:    Denies nausea, vomiting, diarrhea, constipation, or abdominal pain.   :   Denies dysuria, hematuria, hesitancy, or urgency.  HEME:  Denies easy bruising, bleeding, or problem with clots.   ALLG:  Denies allergies, asthma, or hives.    MSK:  Denies weakness, edema,   +notes some  intermittent aches, as per HPI.   SKIN:  Denies rashes, itches, or sores.   NEURO:  Denies numbness or tingling, altered sensation, or focal weakness.    ENDO:  Denies polyuria, or polydipsia.  +notes hot-flashes, recently.   PSYCH:  Denies anxiety, depression, substance abuse, or insomnia.         Past Medical History:   Diagnosis Date   • Anesthesia     severe ponv, hard to wake up   • Arthritis     shoulders   • Hypothyroid     hypothyroid   • Infectious disease     around bronchitis/flu 12/2011   • Lyme disease     chronic   • Pain     right foot   • Parkinson disease (CMS-HCC)     Tremor on right side of body       Past Surgical History:   Procedure Laterality Date   • BUNIONECTOMY Right 5/1/2017    Procedure: BUNIONECTOMY - PROXIMAL HALLUX VALGUS CORRECTION W/BONE GRAFT;  Surgeon: Janes Benavidez M.D.;  Location: [facility];  Service:    • NEUROMA EXCISION  5/1/2017    Procedure: NEUROMA EXCISION - 2ND WEBSPACE;  Surgeon: Janes Benavidez M.D.;  Location: SURGERY [facility];  Service:    • BREAST IMPLANT REVISION Bilateral 4/11/2016    Procedure: BREAST IMPLANT EXCHANGE OF SALINE TO SILICONE W/REPOSITIONING OF LATERAL FOLDS;  Surgeon: Mikey Adkins M.D.;  Location: [facility];  Service:    • BREAST BIOPSY  1/24/2012    Performed by SANDRA ESTRADA at SURGERY SAME DAY [facility]   • SHOULDER ARTHROSCOPY  12/1/2010    Performed by KATE CHANCE at SURGERY [facility] ORS   • SHOULDER DECOMPRESSION  12/1/2010    Performed by KATE CHANCE at SURGERY [facility] ORS   • ROTATOR CUFF REPAIR  12/1/2010    Performed by KATE CHANCE at SURGERY [facility] ORS   • SHOULDER DECOMPRESSION ARTHROSCOPIC  5/13/2009    Performed by KATE CHANCE at SURGERY [facility] ORS   • ROTATOR CUFF REPAIR  5/13/2009    Performed by KATE CHANCE at SURGERY [facility]   • HYSTERECTOMY, VAGINAL  1999   • PB ENLARGE BREAST WITH IMPLANT  1999   • OTHER ORTHOPEDIC SURGERY  1993     joint repair left thumb   • US-NEEDLE CORE BX-BREAST PANEL         Family History   Problem Relation Age of Onset   • Cancer Paternal Aunt      breast   • Diabetes Mother    • Thyroid Mother    • Diabetes Father    • Thyroid Daughter        Social History     Social History   • Marital status:      Spouse name: N/A   • Number of children: N/A   • Years of education: N/A     Occupational History   • Not on file.     Social History Main Topics   • Smoking status: Never Smoker   • Smokeless tobacco: Never Used   • Alcohol use Yes      Comment: 1 or 2 drinks per month   • Drug use: No   • Sexual activity: Yes     Other Topics Concern   • Not on file     Social History Narrative   • No narrative on file       Current Outpatient Prescriptions   Medication Sig Dispense Refill   • levothyroxine (SYNTHROID) 88 MCG Tab Take 88 mcg by mouth Every morning on an empty stomach.     • Succimer DMSA Powder Take 3 Caps by mouth See Admin Instructions. Three days on, eleven days off.       No current facility-administered medications for this visit.        Allergies as of 01/24/2018 - Reviewed 01/24/2018   Allergen Reaction Noted   • Pcn [penicillins] Rash 11/24/2010   • Percocet [oxycodone-acetaminophen] Vomiting 11/24/2010         Physical Exam  /73   Pulse 96   Temp 37.1 °C (98.8 °F)   Ht 1.524 m (5')   Wt 49.9 kg (110 lb)   SpO2 95%   BMI 21.48 kg/m²   General:  Alert and oriented, No apparent distress.  Eyes: Pupils equal and reactive. No scleral icterus. EOMI.   Throat: Clear, no erythema or exudates noted.  Neck: Supple. No lymphadenopathy noted. Thyroid not enlarged.  Lungs: Clear to auscultation bilaterally.  No wheezes or rales.  Cardiovascular: Regular rate and rhythm.  No murmurs, rubs or gallops.  Abdomen:  Soft.  Non-distended.  Non-tender.    Extremities: No pedal edema. Good general motion all 4 extremities.  + Mild tremor in the right hand, more noticeable in the 4th digit.  Scar over the right  foot, from prior surgery.  Good active and passive range of motion, for all 4 extremities.  On AROM, patient noted mild pain posterior to the right glenohumeral joint, with external rotation and abduction.  Muscular knot/nodule, was noted in the same region, that seemed to improve with mild pressure.  Neurological:  Motor function and sensation grossly intact and symmetrical.   DTRs were globally slightly increased, but bilaterally symmetrical.  Psychological: Appears to have normal mood and affect.      Assessment & Plan    1. Hypothyroidism, unspecified type  Patient has history of this for about 25 years.  She thinks she is doing well on Synthroid 88, however   she notes intermittent episodes of feeling a fast heart rate (but still below 100).  Will obtain new thyroid function testing, before prescribing new dose.  Additionally, due to long-standing thyroid condition and we'll get a   vitamin D level to further evaluate for bone status (since she does not yet need a DEXA).   - CBC WITH DIFFERENTIAL; Future  - COMP METABOLIC PANEL; Future  - TSH; Future  - FREE THYROXINE; Future  - VITAMIN D,25 HYDROXY; Future    2. Parkinson disease (CMS-[facility])  She has been seen by neurology, for a couple years for this as a likely diagnosis.  She has recently been diagnosed by [facility], in [place], with advanced imaging.  She has not yet been placed on medication, as she would like to follow-up   with her prior neurologist, and discussed possible treatment options in more detail.    3. Hx of Lyme disease  Primarily as a past medical history finding.  However, the patient notes that she is concerned about recurrence, given the other complaint she has of vague arthralgias.  On her past treatment for this, she was likely overtreated, for several years, with antibiotics and other medications. However, she appears concerned about this, so we will refer to ID, to evaluate for appropriate management.  - REFERRAL TO INFECTIOUS  DISEASE    4. Arthralgia, unspecified joint  Predominantly appears to be muscle overuse of the right shoulder, with adjacent muscle knot.  However, this may also be aggravated by her Parkinson's disease, and extra effort using the right shoulder/arm, to avoid tremor.  Her knees/head/feet did not have any significant finding for pain, discomfort, or edema.  She had previously been advised that some of the muscle cramping and pains might be related to her Parkinson's disease.  She is concerned about recurrence of Lyme disease, and can be seen by infectious disease for further evaluation.  Additionally, neurology can also further evaluate to see if it is secondary to Parkinson's.  - CBC WITH DIFFERENTIAL; Future  - COMP METABOLIC PANEL; Future  - REFERRAL TO INFECTIOUS DISEASE  - VITAMIN D,25 HYDROXY; Future    5. Hot flashes due to menopause  Patient is at low risk for complications for use of HRT.  She has ovaries, but is post hysterectomy, therefore she can be started on Premarin rather than Prempro.  - conjugated estrogen (PREMARIN) 0.3 MG Tab; Take 1 Tab by mouth every day.  Dispense: 30 Tab; Refill: 5        Health Maintenance  Dexa - thinks she had some at a fair, a decade ago, but none recently.  Not due for one yet.   Conoloscopy - 2012 (told normal, with 10 yr follow-up). - Noted diverticulosis, but not irritation.   Mammogram - 5/2017  Pap - N/A (post-hysterectomy)  PSA - n/a  influ vaccine - 10 yrs.  Declined    Pneumo Vaccine - n/a  Tetanus - uncertain, but thinks 5 years ago. (can check requested records).   Labs < 12 mo / met screen - likely over a year ago (outside PCP).  Ordering new labs today         Parts of this note were created with a computerized dictation system.    Despite review, there may be some spelling or grammatical errors.    Meet Browne M.D.  1/24/2018

## 2018-01-24 NOTE — LETTER
Barcoding  Meet Browne M.D.  [address] [facility] [address]  [place] [address]  Fax: [phone]   Authorization for Release/Disclosure of   Protected Health Information   Name: ROBBY COATES : 1960 SSN: [id number]   Address: [address] [place] [address]  [place] [address] Phone:    [phone] (home)    I authorize the entity listed below to release/disclose the PHI below to:   Barcoding/Meet Browne M.D. and Meet Browne M.D.   Provider or Entity Name:   Alek Long MD   Address   City, [facility], [place], [place] Phone:      Fax:     Reason for request: continuity of care   Information to be released:    [  ] LAST COLONOSCOPY,  including any PATH REPORT and follow-up  [  ] LAST FIT/COLOGUARD RESULT [  ] LAST DEXA  [  ] LAST MAMMOGRAM  [  ] LAST PAP  [ x ] LAST  LABS  [ x ] LAST Notes [  ] RETINA EXAM REPORT  [  ] IMMUNIZATION RECORDS  [  ] Release all info      [  ] Check here and initial the line next to each item to release ALL health information INCLUDING  _____ Care and treatment for drug and / or alcohol abuse  _____ HIV testing, infection status, or AIDS  _____ Genetic Testing    DATES OF SERVICE OR TIME PERIOD TO BE DISCLOSED: __2012 - 2018 ____  I understand and acknowledge that:  * This Authorization may be revoked at any time by you in writing, except if your health information has already been used or disclosed.  * Your health information that will be used or disclosed as a result of you signing this authorization could be re-disclosed by the recipient. If this occurs, your re-disclosed health information may no longer be protected by State or Federal laws.  * You may refuse to sign this Authorization. Your refusal will not affect your ability to obtain treatment.  * This Authorization becomes effective upon signing and will  on (date) __________.      If no date is indicated, this Authorization will  one (1) year from the signature date.    Name:  Mariela Huff    Signature:   Date:     1/24/2018       PLEASE FAX REQUESTED RECORDS BACK TO: [phone]

## 2018-01-24 NOTE — PATIENT INSTRUCTIONS
"Please get the lab work, within a few days.   Please get while fasting for 8 hours (water is ok, but no juice or coffee) for 8 hours.    Please follow-up in a few months, and you can call about scheduling a \"wellness visit / PPHA visit\" a few months from now.   Thank you.   "

## 2018-02-02 ENCOUNTER — TELEPHONE (OUTPATIENT)
Dept: INTERNAL MEDICINE | Facility: MEDICAL CENTER | Age: 58
End: 2018-02-02

## 2018-02-02 ENCOUNTER — DOCUMENTATION (OUTPATIENT)
Dept: INTERNAL MEDICINE | Facility: MEDICAL CENTER | Age: 58
End: 2018-02-02

## 2018-02-03 NOTE — TELEPHONE ENCOUNTER
"Documentation Note:     (not telephone call)  ...  Received a scanned fax of old medical records (20 pages).   [facility]: Dr. Alek Long.  The following is a brief overview.   (can see \"media\" for scan.)  ...  Some of the conditions they note include the following.  ...  Penicillin-rash    Acquired hypothyroidism, NOS  Hysterectomy, 1999  Left shoulder surgery, 2008, 2010  Diverticulitis of the large intestines, 4/22/16.  Vitamin D deficiency, unspecified, 4/22/16  Lyme disease, 10/13/14,  Parkinson's disease, 3/24/14 (initially declined to believe dx, per note).  Peripheral neuropathy with numbness, 5/30/13  Vaccination for the DTAP, 12/27/12  Surgical menopause, 5/6/2009  Pneumovax, 1/24/18  MMR 1/24/18  TDap - 12/27/12, 12/27/18  Vericella, 1/24/18  Vitamin D, 25-hydroxy, 24.0, low, on 1/7/2015    Breast biopsy 1/2012    Calcified nodule in left breast, 3/6/15:    Well-defined 4.7 mm diameter, with full fine calcifications. The number of microcalcifications had increased from prior study. Ultrasound noted solid nodule at 6:30 position, was 3 cm from the nipple. It measured 6.6x 6.5x 3.3 mm. Radiological impression was  1. Increasing microcalcifications with rounded benign appearing solid nodule in the inferior medial quadrant of the left breast. 2. This may represent a calcifying fibroadenoma.  3. Follow-up options including follow-up mammography in some sonography in 6 months, attempted ultrasound-guided needle biopsy, or excisional biopsy.  4. The patient was encouraged to discuss these options with her physician.  5. Tentatively six-month follow-up mammography and sonography is recommended.  Results were given to the patient at the time of visits.  R3.  Category 3: Probable benign finding-short interval follow-up suggested.    Renal ultrasound, 9/14/2007, performed for frequent UTIs, ultrasound was unremarkable/normal.    EKG, 1/26/18, read by that physician has a normal EKG, with normal " sinus rhythm (with respiratory variation).  ...  Meet Browne M.D.  2/2/2018

## 2018-02-27 ENCOUNTER — APPOINTMENT (RX ONLY)
Dept: URBAN - METROPOLITAN AREA CLINIC 20 | Facility: CLINIC | Age: 58
Setting detail: DERMATOLOGY
End: 2018-02-27

## 2018-02-27 ENCOUNTER — TELEPHONE (OUTPATIENT)
Dept: INTERNAL MEDICINE | Facility: MEDICAL CENTER | Age: 58
End: 2018-02-27

## 2018-02-27 DIAGNOSIS — N95.1 MENOPAUSAL HOT FLUSHES: ICD-10-CM

## 2018-02-27 DIAGNOSIS — G20.A1 PARKINSON DISEASE: ICD-10-CM

## 2018-02-27 DIAGNOSIS — M25.50 ARTHRALGIA, UNSPECIFIED JOINT: ICD-10-CM

## 2018-02-27 DIAGNOSIS — E03.9 HYPOTHYROIDISM, UNSPECIFIED TYPE: ICD-10-CM

## 2018-02-27 DIAGNOSIS — L259 CONTACT DERMATITIS AND OTHER ECZEMA, UNSPECIFIED CAUSE: ICD-10-CM

## 2018-02-27 PROBLEM — L23.9 ALLERGIC CONTACT DERMATITIS, UNSPECIFIED CAUSE: Status: ACTIVE | Noted: 2018-02-27

## 2018-02-27 PROCEDURE — 99213 OFFICE O/P EST LOW 20 MIN: CPT

## 2018-02-27 PROCEDURE — ? COUNSELING

## 2018-02-27 PROCEDURE — ? ADDITIONAL NOTES

## 2018-02-27 PROCEDURE — ? PRESCRIPTION

## 2018-02-27 RX ORDER — CLOBETASOL PROPIONATE 0.5 MG/G
CREAM TOPICAL BID
Qty: 1 | Refills: 3 | Status: ERX | COMMUNITY
Start: 2018-02-27

## 2018-02-27 RX ADMIN — CLOBETASOL PROPIONATE 1: 0.5 CREAM TOPICAL at 00:00

## 2018-02-27 ASSESSMENT — LOCATION DETAILED DESCRIPTION DERM: LOCATION DETAILED: RIGHT PROXIMAL POSTERIOR UPPER ARM

## 2018-02-27 ASSESSMENT — LOCATION ZONE DERM: LOCATION ZONE: ARM

## 2018-02-27 ASSESSMENT — LOCATION SIMPLE DESCRIPTION DERM: LOCATION SIMPLE: RIGHT POSTERIOR UPPER ARM

## 2018-02-27 NOTE — TELEPHONE ENCOUNTER
----- Message from Niki Moon, Med Ass't sent at 2018  8:11 AM PST -----  Regarding: Dr. Browne-lab orders  Contact: [phone]  Patient was at the lab to have her blood drawn but the orders are , can we please extend them?  Thank-you Please call patient when done.

## 2018-02-27 NOTE — HPI: RASH
How Severe Is Your Rash?: mild
Is This A New Presentation, Or A Follow-Up?: Rash
Additional History: Pt has history of Lyme disease, treated for 2 years w/ antibiotics. She is being treated by Dr. Mccabe w/ natural remedies, uncertain name other than a blend of botanical medications. Recently started DMSA infusions, every other week for 4 weeks. The infusion is for detoxing the body of heavy metals.

## 2018-02-28 PROBLEM — M25.50 ARTHRALGIA: Status: ACTIVE | Noted: 2018-02-28

## 2018-02-28 PROBLEM — N95.1 MENOPAUSAL HOT FLUSHES: Status: ACTIVE | Noted: 2018-02-28

## 2018-03-19 ENCOUNTER — APPOINTMENT (RX ONLY)
Dept: URBAN - METROPOLITAN AREA CLINIC 20 | Facility: CLINIC | Age: 58
Setting detail: DERMATOLOGY
End: 2018-03-19

## 2018-03-19 ENCOUNTER — HOSPITAL ENCOUNTER (OUTPATIENT)
Dept: LAB | Facility: MEDICAL CENTER | Age: 58
End: 2018-03-19
Attending: STUDENT IN AN ORGANIZED HEALTH CARE EDUCATION/TRAINING PROGRAM
Payer: COMMERCIAL

## 2018-03-19 DIAGNOSIS — R21 RASH AND OTHER NONSPECIFIC SKIN ERUPTION: ICD-10-CM

## 2018-03-19 DIAGNOSIS — E03.9 HYPOTHYROIDISM, UNSPECIFIED TYPE: ICD-10-CM

## 2018-03-19 DIAGNOSIS — M25.50 ARTHRALGIA, UNSPECIFIED JOINT: ICD-10-CM

## 2018-03-19 DIAGNOSIS — N95.1 MENOPAUSAL HOT FLUSHES: ICD-10-CM

## 2018-03-19 LAB
25(OH)D3 SERPL-MCNC: 22 NG/ML (ref 30–100)
ALBUMIN SERPL BCP-MCNC: 4.4 G/DL (ref 3.2–4.9)
ALBUMIN/GLOB SERPL: 1.5 G/DL
ALP SERPL-CCNC: 66 U/L (ref 30–99)
ALT SERPL-CCNC: 17 U/L (ref 2–50)
ANION GAP SERPL CALC-SCNC: 9 MMOL/L (ref 0–11.9)
AST SERPL-CCNC: 21 U/L (ref 12–45)
BASOPHILS # BLD AUTO: 0.9 % (ref 0–1.8)
BASOPHILS # BLD: 0.04 K/UL (ref 0–0.12)
BILIRUB SERPL-MCNC: 0.4 MG/DL (ref 0.1–1.5)
BUN SERPL-MCNC: 17 MG/DL (ref 8–22)
CALCIUM SERPL-MCNC: 9.7 MG/DL (ref 8.5–10.5)
CHLORIDE SERPL-SCNC: 103 MMOL/L (ref 96–112)
CO2 SERPL-SCNC: 26 MMOL/L (ref 20–33)
CREAT SERPL-MCNC: 0.77 MG/DL (ref 0.5–1.4)
EOSINOPHIL # BLD AUTO: 0.14 K/UL (ref 0–0.51)
EOSINOPHIL NFR BLD: 3.3 % (ref 0–6.9)
ERYTHROCYTE [DISTWIDTH] IN BLOOD BY AUTOMATED COUNT: 40.9 FL (ref 35.9–50)
GLOBULIN SER CALC-MCNC: 3 G/DL (ref 1.9–3.5)
GLUCOSE SERPL-MCNC: 73 MG/DL (ref 65–99)
HCT VFR BLD AUTO: 48.1 % (ref 37–47)
HGB BLD-MCNC: 16.1 G/DL (ref 12–16)
IMM GRANULOCYTES # BLD AUTO: 0.01 K/UL (ref 0–0.11)
IMM GRANULOCYTES NFR BLD AUTO: 0.2 % (ref 0–0.9)
LYMPHOCYTES # BLD AUTO: 1.31 K/UL (ref 1–4.8)
LYMPHOCYTES NFR BLD: 30.7 % (ref 22–41)
MCH RBC QN AUTO: 31.7 PG (ref 27–33)
MCHC RBC AUTO-ENTMCNC: 33.5 G/DL (ref 33.6–35)
MCV RBC AUTO: 94.7 FL (ref 81.4–97.8)
MONOCYTES # BLD AUTO: 0.34 K/UL (ref 0–0.85)
MONOCYTES NFR BLD AUTO: 8 % (ref 0–13.4)
NEUTROPHILS # BLD AUTO: 2.43 K/UL (ref 2–7.15)
NEUTROPHILS NFR BLD: 56.9 % (ref 44–72)
NRBC # BLD AUTO: 0 K/UL
NRBC BLD-RTO: 0 /100 WBC
PLATELET # BLD AUTO: 232 K/UL (ref 164–446)
PMV BLD AUTO: 10.8 FL (ref 9–12.9)
POTASSIUM SERPL-SCNC: 4.5 MMOL/L (ref 3.6–5.5)
PROT SERPL-MCNC: 7.4 G/DL (ref 6–8.2)
RBC # BLD AUTO: 5.08 M/UL (ref 4.2–5.4)
SODIUM SERPL-SCNC: 138 MMOL/L (ref 135–145)
T4 FREE SERPL-MCNC: 1.09 NG/DL (ref 0.53–1.43)
TSH SERPL DL<=0.005 MIU/L-ACNC: 1.38 UIU/ML (ref 0.38–5.33)
WBC # BLD AUTO: 4.3 K/UL (ref 4.8–10.8)

## 2018-03-19 PROCEDURE — ? ADDITIONAL NOTES

## 2018-03-19 PROCEDURE — 82306 VITAMIN D 25 HYDROXY: CPT

## 2018-03-19 PROCEDURE — 99213 OFFICE O/P EST LOW 20 MIN: CPT

## 2018-03-19 PROCEDURE — ? COUNSELING

## 2018-03-19 PROCEDURE — 80053 COMPREHEN METABOLIC PANEL: CPT

## 2018-03-19 PROCEDURE — 85025 COMPLETE CBC W/AUTO DIFF WBC: CPT

## 2018-03-19 PROCEDURE — 84443 ASSAY THYROID STIM HORMONE: CPT

## 2018-03-19 PROCEDURE — 84439 ASSAY OF FREE THYROXINE: CPT

## 2018-03-19 PROCEDURE — 36415 COLL VENOUS BLD VENIPUNCTURE: CPT

## 2018-03-19 ASSESSMENT — LOCATION DETAILED DESCRIPTION DERM: LOCATION DETAILED: RIGHT ANTERIOR DISTAL UPPER ARM

## 2018-03-19 ASSESSMENT — LOCATION ZONE DERM: LOCATION ZONE: ARM

## 2018-03-19 ASSESSMENT — LOCATION SIMPLE DESCRIPTION DERM: LOCATION SIMPLE: RIGHT UPPER ARM

## 2018-03-19 NOTE — PROCEDURE: ADDITIONAL NOTES
Additional Notes: If rash flares again, ok to double book for a biopsy
Additional Notes: Discussed my concern regarding future infusions.  If pt does have re flare of rash RTC for eval and bx.

## 2018-04-12 ENCOUNTER — OFFICE VISIT (OUTPATIENT)
Dept: INTERNAL MEDICINE | Facility: MEDICAL CENTER | Age: 58
End: 2018-04-12
Payer: COMMERCIAL

## 2018-04-12 VITALS
HEIGHT: 60 IN | RESPIRATION RATE: 18 BRPM | BODY MASS INDEX: 21.87 KG/M2 | SYSTOLIC BLOOD PRESSURE: 106 MMHG | HEART RATE: 84 BPM | DIASTOLIC BLOOD PRESSURE: 68 MMHG | OXYGEN SATURATION: 97 % | WEIGHT: 111.4 LBS | TEMPERATURE: 97.8 F

## 2018-04-12 DIAGNOSIS — G20.A1 PARKINSON DISEASE: ICD-10-CM

## 2018-04-12 DIAGNOSIS — M25.50 ARTHRALGIA, UNSPECIFIED JOINT: ICD-10-CM

## 2018-04-12 DIAGNOSIS — E55.9 VITAMIN D DEFICIENCY: ICD-10-CM

## 2018-04-12 DIAGNOSIS — M20.11 ACQUIRED HALLUX VALGUS OF RIGHT FOOT: ICD-10-CM

## 2018-04-12 DIAGNOSIS — E03.9 HYPOTHYROIDISM, UNSPECIFIED TYPE: ICD-10-CM

## 2018-04-12 DIAGNOSIS — Z00.00 WELLNESS EXAMINATION: ICD-10-CM

## 2018-04-12 PROCEDURE — 99396 PREV VISIT EST AGE 40-64: CPT | Mod: GC | Performed by: INTERNAL MEDICINE

## 2018-04-12 RX ORDER — LEVOTHYROXINE SODIUM 88 UG/1
88 TABLET ORAL
Qty: 90 TAB | Refills: 2 | Status: SHIPPED | OUTPATIENT
Start: 2018-04-12 | End: 2018-04-12 | Stop reason: SDUPTHER

## 2018-04-12 RX ORDER — LEVOTHYROXINE SODIUM 88 UG/1
88 TABLET ORAL
Qty: 90 TAB | Refills: 3 | Status: SHIPPED | OUTPATIENT
Start: 2018-04-12 | End: 2018-07-20

## 2018-04-12 RX ORDER — LEVOTHYROXINE SODIUM 88 UG/1
88 TABLET ORAL
Qty: 90 TAB | Refills: 2 | Status: SHIPPED | OUTPATIENT
Start: 2018-04-12 | End: 2018-04-12

## 2018-04-12 ASSESSMENT — PAIN SCALES - GENERAL: PAINLEVEL: NO PAIN

## 2018-04-12 NOTE — PROGRESS NOTES
Established Patient    CC: follow-up: labs, thyroid, hot-flashes.     HPI:  Mariela Huff is a 57 y.o. female here for follow-up of multiple issues and labs.     Hypothyroidism  Patient has had chronic low thyroid for several decades.  She has been consistently taking Synthroid 88, but had to have new labs repeated on her prior visit. Her recent TSH has come back at 1.380.  She has had her levothyroxine refilled (with a note that the patient prefers the brand name Synthroid).  She continues feel well with this, but does have a mild tremor; however, this is most likely secondary to her Parkinson's.  Otherwise she denies any skin changes, lower energy, congestion changes, or fatigue.     Right foot.  Patient has previously had foot surgery/bunion surgery, with Dr. Benavidez.  She notes that she has been recovering from that well, and has also had neuroma removed from her foot. However, she notes on this visit, that she still continues to have some toe deviation that she is unhappy with, and is planning further surgery at his office, in the future, to correct this.    Parkinson's / PD.  Patient noted some mild tremor, especially in her right hand. She states that she was only recently diagnosed in 12/2017 (after reportedly going to [facility], where they injected a tracer, that noted decreased dopamine). However, old records note that her prior PCP had noted a tremor for quite some time, and diagnosed this in 2014. However, his prior note states that the patient was refusing to accept that diagnosis, at that time.  She notes that she currently follows up with Dr. Cobos (neurology), and we will manage this condition through his office.  On this visit, she notes that she has not had any major changes in her condition. He notes that she continues have slightly smaller / shorter gait, then when she was younger, but otherwise states that she has not had too many problems.    Hot Flashes.  Patient previously noted  "some hot flashes, and had tried some Premarin. However, it had not made much difference, so she states that she had stopped this on her own. She states that it did not seem to have much effect, and is still able to manage fairly well without the medication.  Patient does not smoke, no history of migraines, breast cancer, or clotting problems.      Patient also notes that she continues to receive EDTA chelation therapy, from Dr. Nikolas Mario ([facility] / integrative medicine), due to having detected some mercury in her urine (although it was never documented in blood testing).  Additionally, she has a prior history of receiving oral succimer, from a another integrative medicine doctor, from [place], in the past.      Additionally, today, the patient states that she is frustrated with clinic. She states that she was misinformed by the scheduling staff, as to when the new \"wellness\" visit would be.  And, feels that we should have faster access to her outside medical records.  As a result, she states she was like to switch care, and go to another clinic.      Review of Symptoms  GEN/CONST:   Denies fever, fatigue, weakness,    CARDIO:   Denies chest pain, palpitations, orthopnea, or edema.  RESP:   Denies shortness of breath, wheezing, or coughing.  GI:    Denies nausea, vomiting, diarrhea, constipation, or abdominal pain.   :   Denies dysuria, hematuria, hesitancy, or urgency.  HEME:  Denies easy bruising, bleeding,    ALLG:  Denies allergies, asthma, or hives.    MSK:  Denies weakness, edema,  or muscle aches.   + arthralgias (multiple), and PD (see HPI).   SKIN:  Denies rashes, itches, or sores.   NEURO:  Denies numbness or tingling, altered sensation, or focal weakness.    ENDO:  Denies heat or cold intolerance, polyuria, or polydipsia.  PSYCH:  Denies anxiety, depression, substance abuse, or insomnia.       Family History   Problem Relation Age of Onset   • Cancer Paternal Aunt      breast   • Diabetes " Mother    • Thyroid Mother    • Diabetes Father    • Thyroid Daughter        Social History     Social History   • Marital status:      Spouse name: N/A   • Number of children: N/A   • Years of education: N/A     Occupational History   • Not on file.     Social History Main Topics   • Smoking status: Never Smoker   • Smokeless tobacco: Never Used   • Alcohol use Yes      Comment: 1 or 2 drinks per month   • Drug use: No   • Sexual activity: Yes     Other Topics Concern   • Not on file     Social History Narrative   • No narrative on file       Current Outpatient Prescriptions   Medication Sig Dispense Refill   • levothyroxine (SYNTHROID) 88 MCG Tab Take 1 Tab by mouth Every morning on an empty stomach for 30 days. 30 Tab 0     No current facility-administered medications for this visit.        Allergies   Allergen Reactions   • Pcn [Penicillins] Rash   • Percocet [Oxycodone-Acetaminophen] Vomiting       Patient Active Problem List    Diagnosis Date Noted   • Arthralgia 02/28/2018   • Menopausal hot flushes 02/28/2018   • Hypothyroidism 01/24/2018   • Parkinson disease (CMS-HCC) 01/24/2018   • Acquired hallux valgus of right foot 05/01/2017   • Tremor of unknown origin 02/28/2017   • Encounter for cosmetic surgery 04/11/2016       Physical Exam  /68   Pulse 84   Temp 36.6 °C (97.8 °F)   Resp 18   Ht 1.524 m (5')   Wt 50.5 kg (111 lb 6.4 oz)   SpO2 97%   Breastfeeding? No   BMI 21.76 kg/m²   General:  Alert and oriented, No apparent distress.  Lungs: Clear to auscultation bilaterally.  No wheezes or rales.  Cardiovascular: Regular rate and rhythm.  No murmurs, rubs or gallops.  Abdomen:  Soft.  Non-distended.  Non-tender.    Normal bowel sounds.  No rebound or guarding noted.   Extremities: No pedal edema.  Good general motion of all 4 extremities.   Gait with slightly short steps.  Mild tremor, more noticeable in right arm.   Neurological:  Motor function and sensation grossly intact and  "symmetrical.   Psychological: Appears to have normal mood and affect.    Labs:  (brief summary):  Vitamin D, decreased at 22.  TSH normal at 1.38.  WBC slightly decreased at 4.3, but individual numbers and percentages were within normal ranges.  H&H was just above high order, but she may have been dehydrated at that time (she notes that she had not had much water at the time).      Assessment & Plan    1. Hypothyroidism, unspecified type  Recent TSH was in the normal range, and we have refilled Synthroid.  Patient states she would like \"Synthroid\" brand specified in prescriptions.  This was done, as requested (in \"note to pharmacy\" section of prescription).   - levothyroxine (SYNTHROID) 88 MCG Tab; Take 1 Tab by mouth Every morning on an empty stomach for 360 days.  Dispense: 90 Tab; Refill: 3    2. Acquired hallux valgus of right foot  Patient states she is unhappy with the valgus deviation of some of her toes and her right foot.  She notes that this continues even after the past bunion surgery, that she has previously had with Dr. Benavidez. She states that she will follow-up with Dr. Benavidez, as she is already made an appointment with him.    3. Parkinson disease (CMS-[facility])  No acute change. She notes mild tremor right hand and slight decrease in gait.  She will let Dr. Cobos (neurology), discuss further treatment options with her; but this time, she has not started medications.    4. Vitamin D deficiency  Her vitamin D level was 22. On review of prior records, she had previously been diagnosed as deficient, at a level of 24.  She notes that she had previously been started on vitamin D supplementation, but then stopped it at some point.  She was recommended to restart vitamin D, 1000 units, daily, with eventual follow-up for labs, in the future.     5. Need for future Mammogram  Patient has previously been followed for a calcification (of a suspected fibroadenoma, per prior mammogram).  She is coming-up on the " time-frame for her repeat examination. She states that she has already made arrangements for a follow-up mammogram, through an outside agency.       6. Wellness examination  Lab results were reviewed with patient, in detail.  We went over her other medical issues, and recommendations.  She states that she has already arranged for an outside mammogram, as noted above.  She has previously had a colonoscopy in 2012 (with diverticulosis).  She has declined influenza vaccination, and does not yet require pneumonia vaccine.  Her last TDaP was in 2012 (per prior chart review).        Patient has stated that she will establish with another office.      Parts of this note were created with a computerized dictation system.    Despite review, there may be some spelling or grammatical errors.    Meet Browne M.D.  4/12/2018

## 2018-04-25 ENCOUNTER — HOSPITAL ENCOUNTER (OUTPATIENT)
Dept: RADIOLOGY | Facility: MEDICAL CENTER | Age: 58
End: 2018-04-25
Attending: STUDENT IN AN ORGANIZED HEALTH CARE EDUCATION/TRAINING PROGRAM
Payer: COMMERCIAL

## 2018-04-25 DIAGNOSIS — Z12.39 SCREENING BREAST EXAMINATION: ICD-10-CM

## 2018-04-25 PROCEDURE — 77067 SCR MAMMO BI INCL CAD: CPT

## 2018-04-30 ENCOUNTER — APPOINTMENT (OUTPATIENT)
Dept: ADMISSIONS | Facility: MEDICAL CENTER | Age: 58
End: 2018-04-30
Attending: ORTHOPAEDIC SURGERY
Payer: COMMERCIAL

## 2018-04-30 RX ORDER — ACETAMINOPHEN 500 MG
1000 TABLET ORAL EVERY 6 HOURS PRN
COMMUNITY
End: 2019-07-01

## 2018-05-07 ENCOUNTER — HOSPITAL ENCOUNTER (OUTPATIENT)
Facility: MEDICAL CENTER | Age: 58
End: 2018-05-07
Attending: ORTHOPAEDIC SURGERY | Admitting: ORTHOPAEDIC SURGERY
Payer: COMMERCIAL

## 2018-05-07 ENCOUNTER — APPOINTMENT (OUTPATIENT)
Dept: RADIOLOGY | Facility: MEDICAL CENTER | Age: 58
End: 2018-05-07
Attending: ORTHOPAEDIC SURGERY
Payer: COMMERCIAL

## 2018-05-07 VITALS
OXYGEN SATURATION: 94 % | HEIGHT: 60 IN | RESPIRATION RATE: 16 BRPM | TEMPERATURE: 97.2 F | BODY MASS INDEX: 21.34 KG/M2 | DIASTOLIC BLOOD PRESSURE: 94 MMHG | SYSTOLIC BLOOD PRESSURE: 141 MMHG | HEART RATE: 58 BPM | WEIGHT: 108.69 LBS

## 2018-05-07 PROCEDURE — 700111 HCHG RX REV CODE 636 W/ 250 OVERRIDE (IP)

## 2018-05-07 PROCEDURE — 502240 HCHG MISC OR SUPPLY RC 0272: Performed by: ORTHOPAEDIC SURGERY

## 2018-05-07 PROCEDURE — 700101 HCHG RX REV CODE 250: Mod: JW

## 2018-05-07 PROCEDURE — 160048 HCHG OR STATISTICAL LEVEL 1-5: Performed by: ORTHOPAEDIC SURGERY

## 2018-05-07 PROCEDURE — 502000 HCHG MISC OR IMPLANTS RC 0278: Performed by: ORTHOPAEDIC SURGERY

## 2018-05-07 PROCEDURE — 160009 HCHG ANES TIME/MIN: Performed by: ORTHOPAEDIC SURGERY

## 2018-05-07 PROCEDURE — 160035 HCHG PACU - 1ST 60 MINS PHASE I: Performed by: ORTHOPAEDIC SURGERY

## 2018-05-07 PROCEDURE — 700101 HCHG RX REV CODE 250

## 2018-05-07 PROCEDURE — 160028 HCHG SURGERY MINUTES - 1ST 30 MINS LEVEL 3: Performed by: ORTHOPAEDIC SURGERY

## 2018-05-07 PROCEDURE — 500881 HCHG PACK, EXTREMITY: Performed by: ORTHOPAEDIC SURGERY

## 2018-05-07 PROCEDURE — A6223 GAUZE >16<=48 NO W/SAL W/O B: HCPCS | Performed by: ORTHOPAEDIC SURGERY

## 2018-05-07 PROCEDURE — A9270 NON-COVERED ITEM OR SERVICE: HCPCS

## 2018-05-07 PROCEDURE — 160002 HCHG RECOVERY MINUTES (STAT): Performed by: ORTHOPAEDIC SURGERY

## 2018-05-07 PROCEDURE — 160039 HCHG SURGERY MINUTES - EA ADDL 1 MIN LEVEL 3: Performed by: ORTHOPAEDIC SURGERY

## 2018-05-07 PROCEDURE — 501838 HCHG SUTURE GENERAL: Performed by: ORTHOPAEDIC SURGERY

## 2018-05-07 PROCEDURE — 73620 X-RAY EXAM OF FOOT: CPT | Mod: RT

## 2018-05-07 PROCEDURE — 160036 HCHG PACU - EA ADDL 30 MINS PHASE I: Performed by: ORTHOPAEDIC SURGERY

## 2018-05-07 PROCEDURE — 700102 HCHG RX REV CODE 250 W/ 637 OVERRIDE(OP)

## 2018-05-07 PROCEDURE — 160022 HCHG BLOCK: Performed by: ORTHOPAEDIC SURGERY

## 2018-05-07 DEVICE — IMPLANTABLE DEVICE: Type: IMPLANTABLE DEVICE | Site: FOOT | Status: FUNCTIONAL

## 2018-05-07 RX ORDER — MAGNESIUM HYDROXIDE 1200 MG/15ML
LIQUID ORAL
Status: COMPLETED | OUTPATIENT
Start: 2018-05-07 | End: 2018-05-07

## 2018-05-07 RX ORDER — SODIUM CHLORIDE, SODIUM LACTATE, POTASSIUM CHLORIDE, CALCIUM CHLORIDE 600; 310; 30; 20 MG/100ML; MG/100ML; MG/100ML; MG/100ML
INJECTION, SOLUTION INTRAVENOUS CONTINUOUS
Status: DISCONTINUED | OUTPATIENT
Start: 2018-05-07 | End: 2018-05-07 | Stop reason: HOSPADM

## 2018-05-07 RX ORDER — BUPIVACAINE HYDROCHLORIDE 2.5 MG/ML
INJECTION, SOLUTION EPIDURAL; INFILTRATION; INTRACAUDAL
Status: DISCONTINUED
Start: 2018-05-07 | End: 2018-05-07 | Stop reason: HOSPADM

## 2018-05-07 RX ORDER — LIDOCAINE HYDROCHLORIDE 10 MG/ML
INJECTION, SOLUTION INFILTRATION; PERINEURAL
Status: DISCONTINUED
Start: 2018-05-07 | End: 2018-05-07 | Stop reason: HOSPADM

## 2018-05-07 RX ORDER — SCOLOPAMINE TRANSDERMAL SYSTEM 1 MG/1
PATCH, EXTENDED RELEASE TRANSDERMAL
Status: DISCONTINUED
Start: 2018-05-07 | End: 2018-05-07 | Stop reason: HOSPADM

## 2018-05-07 RX ADMIN — SODIUM CHLORIDE, SODIUM LACTATE, POTASSIUM CHLORIDE, CALCIUM CHLORIDE 1000 ML: 600; 310; 30; 20 INJECTION, SOLUTION INTRAVENOUS at 08:30

## 2018-05-07 RX ADMIN — HYDROCODONE BITARTRATE AND ACETAMINOPHEN 15 ML: 2.5; 108 SOLUTION ORAL at 11:30

## 2018-05-07 ASSESSMENT — PAIN SCALES - GENERAL
PAINLEVEL_OUTOF10: 0
PAINLEVEL_OUTOF10: 0
PAINLEVEL_OUTOF10: 4
PAINLEVEL_OUTOF10: 0
PAINLEVEL_OUTOF10: 0
PAINLEVEL_OUTOF10: 2

## 2018-05-07 NOTE — OR NURSING
1300 Dr. Benavidez Updated on right foot dressing.  Dr. Benavidez not concerned and no new orders received.      1310 Dc to car via wheel chair.  Pt has all belongings.

## 2018-05-07 NOTE — DISCHARGE INSTRUCTIONS
ACTIVITY: Rest and take it easy for the first 24 hours.  A responsible adult is recommended to remain with you during that time.  It is normal to feel sleepy.  We encourage you to not do anything that requires balance, judgment or coordination.    MILD FLU-LIKE SYMPTOMS ARE NORMAL. YOU MAY EXPERIENCE GENERALIZED MUSCLE ACHES, THROAT IRRITATION, HEADACHE AND/OR SOME NAUSEA.    FOR 24 HOURS DO NOT:  Drive, operate machinery or run household appliances.  Drink beer or alcoholic beverages.   Make important decisions or sign legal documents.    SPECIAL INSTRUCTIONS:   Elevate/ice   Take pain medications scheduled until block wears off.  Hold for sedation.   Keep dressing on at all times, do not remove, do not get wet   You may be only heel weight bearing on your operative extremity in the hard soled shoe    DIET: To avoid nausea, slowly advance diet as tolerated, avoiding spicy or greasy foods for the first day.  Add more substantial food to your diet according to your physician's instructions.  Babies can be fed formula or breast milk as soon as they are hungry.  INCREASE FLUIDS AND FIBER TO AVOID CONSTIPATION.    SURGICAL DRESSING/BATHING: Keep dressing clean, dry and in place until your follow up appointment     FOLLOW-UP APPOINTMENT:  A follow-up appointment should be arranged with your doctor, call to schedule.    You should CALL YOUR PHYSICIAN if you develop:  Fever greater than 101 degrees F.  Pain not relieved by medication, or persistent nausea or vomiting.  Excessive bleeding (blood soaking through dressing) or unexpected drainage from the wound.  Extreme redness or swelling around the incision site, drainage of pus or foul smelling drainage.  Inability to urinate or empty your bladder within 8 hours.  Problems with breathing or chest pain.    You should call 911 if you develop problems with breathing or chest pain.   If you are unable to contact your doctor or surgical center, you should go to the nearest  emergency room or urgent care center.  Physician's telephone #: *Dr. Benavidez [phone]**    If any questions arise, call your doctor.  If your doctor is not available, please feel free to call the Surgical Center at [phone].  The Center is open Monday through Friday from 7AM to 7PM.  You can also call the HEALTH HOTLINE open 24 hours/day, 7 days/week and speak to a nurse at [phone], or toll free at [phone].    A registered nurse may call you a few days after your surgery to see how you are doing after your procedure.    MEDICATIONS: Resume taking daily medication.  Take prescribed pain medication with food.  If no medication is prescribed, you may take non-aspirin pain medication if needed.  PAIN MEDICATION CAN BE VERY CONSTIPATING.  Take a stool softener or laxative such as senokot, pericolace, or milk of magnesia if needed.    Prescription given for *Has at home**.  Last pain medication given at  11:30 Am, next due at 3:00 pm if needed.      If your physician has prescribed pain medication that includes Acetaminophen (Tylenol), do not take additional Acetaminophen (Tylenol) while taking the prescribed medication.    Depression / Suicide Risk    As [facility] are [facility], it is important to learn how to keep safe from harming yourself.    Recognize the warning signs:  · Abrupt changes in personality, positive or negative- including increase in energy   · Giving away possessions  · Change in eating patterns- significant weight changes-  positive or negative  · Change in sleeping patterns- unable to sleep or sleeping all the time   · Unwillingness or inability to communicate  · Depression  · Unusual sadness, discouragement and loneliness  · Talk of wanting to die  · Neglect of personal appearance   · Rebelliousness- reckless behavior  · Withdrawal from people/activities they love  · Confusion- inability to concentrate     If you or a loved one observes any of these  behaviors or has concerns about self-harm, here's what you can do:  · Talk about it- your feelings and reasons for harming yourself  · Remove any means that you might use to hurt yourself (examples: pills, rope, extension cords, firearm)  · Get professional help from the community (Mental Health, Substance Abuse, psychological counseling)  · Do not be alone:Call your Safe Contact- someone whom you trust who will be there for you.  · Call your local CRISIS HOTLINE 056-6175 or 002-076-9810  · Call your local Children's Mobile Crisis Response Team [place] (193) 996-8096 or www.Vtap  · Call the toll free National Suicide Prevention Hotlines   · National Suicide Prevention Lifeline 893-754-MXPM (9882)  · National Hope Line Network 800-SUICIDE (504-8770)

## 2018-05-07 NOTE — OR NURSING
1043 Received from OR, report from Dr. Stokes.  Right foot dressing CDI.      1108 Dc ' d LMA without difficulty.      1130 Oral pain medication given per PT for aching pain

## 2018-05-07 NOTE — OP REPORT
DATE OF SERVICE:  05/07/2018    PREOPERATIVE DIAGNOSES:  1.  Right hallux valgus deformity.  2.  Right previous internal fixation of the foot.  3.  Right metatarsalgia, 2, 3, and 4.  4.  Right metatarsophalangeal joint contractures with valgus deviation 2 and   3.    POSTOPERATIVE DIAGNOSES:  1.  Right hallux valgus deformity.  2.  Right previous internal fixation of the foot.  3.  Right metatarsalgia, 2, 3, and 4.  4.  Right metatarsophalangeal joint contractures with valgus deviation 2 and   3.    PROCEDURE PERFORMED:  1.  Right first metatarsophalangeal joint fusion.  2.  Right open removal and internal fixation.  3.  Right distal metatarsal osteotomies, 2, 3, and 4.  4.  Right metatarsophalangeal joint reconstruction, 2 and 3.    SURGEON:  Janes Benavidez MD    FIRST ASSISTANT:  Dionne Krishnamurthy MD    SECOND ASSISTANT:  Nara Macedo.    ANESTHESIA:  General endotracheal with popliteal block per my request for   postoperative pain management.    ESTIMATED BLOOD LOSS:  None.    COMPLICATIONS:  None.    POSTOPERATIVE PLAN:  1.  Heel-touch weightbearing.  2.  Preoperative antibiotics.  3.  Follow up in 2 weeks.    INDICATIONS:  The patient is a 57-year-old female who has had problems with   her right foot.  The above diagnoses were made and options were discussed   including operative and nonoperative.  She elected to undergo operative   intervention.  The above procedure was discussed and all questions were   answered.  Risks of surgery were explained, which include but not limited to   wound problems, infection, nerve injury, vascular injury, need for surgery.    She understands she could have malunion, nonunion, persistence of pain, joint   stiffness.  She understands and accepts these risks and agreed to proceed.    Her site was marked by myself prior to receiving psychotropic medicines.    PROCEDURE IN DETAIL:  The patient was given a popliteal block per my request   for postoperative pain management.  She  was brought to the operating room and   underwent general endotracheal anesthesia without complications.  Right lower   extremity was prepped and draped in standard fashion in supine position with   all appropriate padding.  Positive site verification confirmed her right lower   extremity as well as procedure and confirmation that she received   preoperative antibiotics.  Esmarch was used to exsanguinate her foot and ankle   and leg tourniquet was inflated to 250 mmHg.  A medial incision was made over   her foot.  It was dissected down to the level of previous internal fixation.    This was cleared of all soft tissue.  Using the appropriate screwdriver, all   screws were removed as well as the plate.  Rongeur was used to smooth down the   area.  The wound was then irrigated with copious irrigation, was closed in   layered fashion using 3-0 Vicryl and 3-0 nylon.  Next, a dorsal incision was   made over first MTPJ.  It was dissected down.  The EHL was identified and   protected.  Soft tissue was released.  Using the Piercefield conical reamers, the   metatarsal head and proximal aspect of the proximal femur were reamed down to   good subchondral bone.  This was irrigated out.  The joint surfaces were   morselized with the guide pin.  The toe was then held in slight dorsiflexion,   slight valgus and neutral supination and pronation.  It was provisionally held   with a K-wire.  Piercefield plate was placed over the foot.  It slightly   plantarflexed the foot and so the plate was bent to match the ideal   dorsiflexion.  Its position was confirmed on C-arm imaging as well as a lid to   simulate weightbearing in the setting of appropriate dorsiflexion.  Piercefield   plate was then transfixed in the proximal phalanx and into the metatarsal.    Compression was brought through the compression hole at time all provisional   K-wires were removed.  C-arm imaging demonstrates satisfactory position for   internal fixation alignment of the  foot.  Next, a dorsal incision was made   over the second and fourth webspace.  It was dissected down the tenolysis on   the extensor tendon was performed as well as a full capsulotomy.  The lateral   and collateral ligaments were released off the second and third   metatarsophalangeal joints.  This significantly improved the overall   co-alignment.  Metatarsal head was then dorsally subluxated.  Microsagittal   saw was used to make an osteotomy parallel to the plantar aspect of the foot.    The first metatarsal head was transitioned posteriorly approximately 3-4 mm   and transfixed with a Smith and Nephew snap-off screw.  This was hand   tightened.  The distal aspect was rongeured down, and the toe did undergo full   range of motion with no evidence of impingement.  The exact procedure was   performed on the third toe and fourth metatarsals without deviations or   complications.  Wounds were irrigated with copious irrigation.  All toes had   good alignment.  There was no evidence for any curling of the fourth toe with   ankle in full dorsiflexion, so no flexor digitorum longus release was   performed.  The wounds were then closed with interrupted nylon suture.    Sterile dressings were applied.  Tourniquet was released.  All toes were pink.    She was transferred to recovery room in good condition.    Utilization of Dr. Krishnamurthy on this patient positioning, holding, retracting,   wound closure, dressing placement.  He was present throughout the entire   procedure.       ____________________________________     MD GRAEME HOOPER / ANA    DD:  05/07/2018 11:35:44  DT:  05/07/2018 12:04:18    D#:  [id number]  Job#:  [id number]

## 2018-05-07 NOTE — OR NURSING
1250- Pt and  provided with d/c paperwork.  All questions answered.  Verbalized understanding.  Prescriptions already filled.    1300- Bleeding noted to right foot dressing, ice pack placed on right foot.

## 2018-05-18 ENCOUNTER — HOSPITAL ENCOUNTER (OUTPATIENT)
Facility: MEDICAL CENTER | Age: 58
End: 2018-05-18
Attending: PHYSICIAN ASSISTANT
Payer: COMMERCIAL

## 2018-05-18 ENCOUNTER — OFFICE VISIT (OUTPATIENT)
Dept: URGENT CARE | Facility: CLINIC | Age: 58
End: 2018-05-18
Payer: COMMERCIAL

## 2018-05-18 VITALS
TEMPERATURE: 98.6 F | WEIGHT: 108 LBS | HEART RATE: 82 BPM | BODY MASS INDEX: 21.2 KG/M2 | OXYGEN SATURATION: 96 % | SYSTOLIC BLOOD PRESSURE: 120 MMHG | HEIGHT: 60 IN | DIASTOLIC BLOOD PRESSURE: 72 MMHG | RESPIRATION RATE: 16 BRPM

## 2018-05-18 DIAGNOSIS — N30.00 ACUTE CYSTITIS WITHOUT HEMATURIA: ICD-10-CM

## 2018-05-18 LAB
APPEARANCE UR: CLEAR
BILIRUB UR STRIP-MCNC: NORMAL MG/DL
COLOR UR AUTO: NORMAL
GLUCOSE UR STRIP.AUTO-MCNC: NEGATIVE MG/DL
KETONES UR STRIP.AUTO-MCNC: NORMAL MG/DL
LEUKOCYTE ESTERASE UR QL STRIP.AUTO: NORMAL
NITRITE UR QL STRIP.AUTO: NORMAL
PH UR STRIP.AUTO: 7 [PH] (ref 5–8)
PROT UR QL STRIP: NORMAL MG/DL
RBC UR QL AUTO: NORMAL
SP GR UR STRIP.AUTO: 1.01
UROBILINOGEN UR STRIP-MCNC: NORMAL MG/DL

## 2018-05-18 PROCEDURE — 87077 CULTURE AEROBIC IDENTIFY: CPT | Mod: 91

## 2018-05-18 PROCEDURE — 81002 URINALYSIS NONAUTO W/O SCOPE: CPT | Performed by: PHYSICIAN ASSISTANT

## 2018-05-18 PROCEDURE — 99214 OFFICE O/P EST MOD 30 MIN: CPT | Performed by: PHYSICIAN ASSISTANT

## 2018-05-18 PROCEDURE — 87086 URINE CULTURE/COLONY COUNT: CPT

## 2018-05-18 PROCEDURE — 87186 SC STD MICRODIL/AGAR DIL: CPT

## 2018-05-18 RX ORDER — NITROFURANTOIN 25; 75 MG/1; MG/1
100 CAPSULE ORAL EVERY 12 HOURS
Qty: 10 CAP | Refills: 0 | Status: SHIPPED | OUTPATIENT
Start: 2018-05-18 | End: 2018-05-23

## 2018-05-18 ASSESSMENT — ENCOUNTER SYMPTOMS
MUSCULOSKELETAL NEGATIVE: 1
SORE THROAT: 0
NAUSEA: 0
VOMITING: 0
SHORTNESS OF BREATH: 0
ABDOMINAL PAIN: 0
FEVER: 0
DIZZINESS: 0
CHILLS: 0
FLANK PAIN: 0
DIARRHEA: 0

## 2018-05-19 DIAGNOSIS — N30.00 ACUTE CYSTITIS WITHOUT HEMATURIA: ICD-10-CM

## 2018-05-19 NOTE — PROGRESS NOTES
Subjective:      Mariela Huff is a 57 y.o. female who presents with Urinary Frequency (frequency, urgency, pain started today 3 AM)            Dysuria    This is a new problem. The current episode started today. The problem occurs every urination. The problem has been unchanged. The quality of the pain is described as burning. The pain is at a severity of 2/10. The pain is mild. There has been no fever. There is no history of pyelonephritis. Associated symptoms include frequency and urgency. Pertinent negatives include no chills, flank pain, hematuria, nausea or vomiting. There is no history of catheterization, kidney stones, recurrent UTIs or a urological procedure.       Review of Systems   Constitutional: Negative for chills and fever.   HENT: Negative for congestion and sore throat.    Respiratory: Negative for shortness of breath.    Cardiovascular: Negative for chest pain.   Gastrointestinal: Negative for abdominal pain, diarrhea, nausea and vomiting.   Genitourinary: Positive for dysuria, frequency and urgency. Negative for flank pain and hematuria.   Musculoskeletal: Negative.    Neurological: Negative for dizziness.          Objective:     /72   Pulse 82   Temp 37 °C (98.6 °F)   Resp 16   Ht 1.524 m (5')   Wt 49 kg (108 lb)   SpO2 96%   BMI 21.09 kg/m²      Physical Exam   Constitutional: She is oriented to person, place, and time. She appears well-developed and well-nourished. No distress.   HENT:   Head: Normocephalic and atraumatic.   Eyes: Pupils are equal, round, and reactive to light.   Neck: Normal range of motion.   Cardiovascular: Normal rate.    Pulmonary/Chest: Effort normal.   Abdominal: Soft. Bowel sounds are normal. She exhibits no distension. There is no tenderness. There is no guarding.   No CVAT bilaterally   Musculoskeletal: Normal range of motion.   Lymphadenopathy:     She has no cervical adenopathy.   Neurological: She is alert and oriented to person, place, and  time.   Skin: Skin is warm and dry. She is not diaphoretic.   Psychiatric: She has a normal mood and affect. Her behavior is normal.   Nursing note and vitals reviewed.          PMH:  has a past medical history of Anesthesia; Arthritis; Diverticulosis (2012); Hypothyroid; Infectious disease; Lyme disease; Pain (04/2018); and Parkinson disease (HCC).  MEDS:   Current Outpatient Prescriptions:   •  nitrofurantoin monohydr macro (MACROBID) 100 MG Cap, Take 1 Cap by mouth every 12 hours for 5 days., Disp: 10 Cap, Rfl: 0  •  acetaminophen (TYLENOL) 500 MG Tab, Take 1,000 mg by mouth every 6 hours as needed., Disp: , Rfl:   •  levothyroxine (SYNTHROID) 88 MCG Tab, Take 1 Tab by mouth Every morning on an empty stomach., Disp: 90 Tab, Rfl: 3  ALLERGIES:   Allergies   Allergen Reactions   • Pcn [Penicillins] Rash   • Percocet [Oxycodone-Acetaminophen] Vomiting     SURGHX:   Past Surgical History:   Procedure Laterality Date   • TOE FUSION Right 5/7/2018    Procedure: TOE FUSION - 1ST MTP;  Surgeon: Janes Benavidez M.D.;  Location: SURGERY SAME DAY [facility];  Service: Orthopedics   • FLEXOR TENDON REPAIR Right 5/7/2018    Procedure: FLEXOR TENDON REPAIR - 4TH RELEASE W/SOFT TISSUE RELEASE 2/3;  Surgeon: Janes Benavidez M.D.;  Location: SURGERY SAME DAY [facility]S;  Service: Orthopedics   • HARDWARE REMOVAL ORTHO Right 5/7/2018    Procedure: HARDWARE REMOVAL ORTHO - INTERNAL FIXATION;  Surgeon: Janes Benavidez M.D.;  Location: SURGERY SAME DAY [facility]S;  Service: Orthopedics   • ORTHOPEDIC OSTEOTOMY Right 5/7/2018    Procedure: ORTHOPEDIC OSTEOTOMY - DISTAL METATARSAL 2/3/4;  Surgeon: Janes Benavidez M.D.;  Location: SURGERY SAME DAY [facility];  Service: Orthopedics   • BUNIONECTOMY Right 5/1/2017    Procedure: BUNIONECTOMY - PROXIMAL HALLUX VALGUS CORRECTION W/BONE GRAFT;  Surgeon: Janes Benavidez M.D.;  Location: [facility];  Service:    • NEUROMA EXCISION  5/1/2017    Procedure: NEUROMA  EXCISION - 2ND WEBSPACE;  Surgeon: Janes Benavidez M.D.;  Location: SURGERY [facility];  Service:    • BREAST IMPLANT REVISION Bilateral 4/11/2016    Procedure: BREAST IMPLANT EXCHANGE OF SALINE TO SILICONE W/REPOSITIONING OF LATERAL FOLDS;  Surgeon: Mikey Adkins M.D.;  Location: SURGERY [facility];  Service:    • BREAST BIOPSY  1/24/2012    Performed by SANDRA ESTRADA at SURGERY SAME DAY [facility]   • SHOULDER ARTHROSCOPY  12/1/2010    Performed by KATE CHANCE at SURGERY [facility] ORS   • SHOULDER DECOMPRESSION  12/1/2010    Performed by AKTE CHANCE at SURGERY [facility] ORS   • ROTATOR CUFF REPAIR  12/1/2010    Performed by KATE CHANCE at SURGERY [facility] ORS   • SHOULDER DECOMPRESSION ARTHROSCOPIC  5/13/2009    Performed by KATE CHANCE at SURGERY [facility] ORS   • ROTATOR CUFF REPAIR  5/13/2009    Performed by KATE CHANCE at SURGERY [facility]   • HYSTERECTOMY, VAGINAL  1999   • PB ENLARGE BREAST WITH IMPLANT  1999   • OTHER ORTHOPEDIC SURGERY  1993    joint repair left thumb   • US-NEEDLE CORE BX-BREAST PANEL       SOCHX:  reports that she has never smoked. She has never used smokeless tobacco. She reports that she drinks alcohol. She reports that she does not use drugs.  FH: family history includes Cancer in her paternal aunt; Diabetes in her father and mother; Thyroid in her daughter and mother.    POCT Urinalysis:  Component Results     Component Value Ref Range & Units Status   POC Color Bright Orange  Negative Final   POC Appearance Clear  Negative Final   POC Leukocyte Esterase  Negative    POC Nitrites  Negative    POC Urobiligen  Negative (0.2) mg/dL    POC Protein  Negative mg/dL    POC Urine PH 7.0  5.0 - 8.0 Final   POC Blood Trace  Negative Final   POC Specific Gravity 1.010  <1.005 - >1.030 Final   POC Ketones  Negative mg/dL    POC Bilirubin  Negative mg/dL    POC Glucose Negative  Negative mg/dL Final   Last Resulted Time   Fri May 18, 2018   5:18 PM     Assessment/Plan:     1. Acute cystitis without hematuria  - POCT Urinalysis --> trace blood, patient on azo  - URINE CULTURE(NEW); Future  - nitrofurantoin monohydr macro (MACROBID) 100 MG Cap; Take 1 Cap by mouth every 12 hours for 5 days.  Dispense: 10 Cap; Refill: 0   - Complete full course of antibiotics as prescribed   - Pt educated on preventative measures for avoiding future UTIs  - Advised to increase fluid intake  - OTC Pyridium (Azo) for symptomatic relief, advised that it will turn urine orange in color  - Pending urine culture  - ER precautions given regarding pyelonephritis including fevers greater than 101 and, vomiting and dehydration, increased back pain.

## 2018-05-21 ENCOUNTER — OFFICE VISIT (OUTPATIENT)
Dept: NEUROLOGY | Facility: MEDICAL CENTER | Age: 58
End: 2018-05-21
Payer: COMMERCIAL

## 2018-05-21 VITALS — DIASTOLIC BLOOD PRESSURE: 60 MMHG | HEIGHT: 60 IN | SYSTOLIC BLOOD PRESSURE: 110 MMHG | HEART RATE: 104 BPM

## 2018-05-21 DIAGNOSIS — G20.A1 PARKINSON DISEASE: Primary | ICD-10-CM

## 2018-05-21 LAB
BACTERIA UR CULT: ABNORMAL
SIGNIFICANT IND 70042: ABNORMAL
SITE SITE: ABNORMAL
SOURCE SOURCE: ABNORMAL

## 2018-05-21 PROCEDURE — 99213 OFFICE O/P EST LOW 20 MIN: CPT | Performed by: PSYCHIATRY & NEUROLOGY

## 2018-05-21 RX ORDER — RASAGILINE 0.5 MG/1
1 TABLET ORAL DAILY
Qty: 30 TAB | Refills: 1 | Status: SHIPPED | OUTPATIENT
Start: 2018-05-21 | End: 2018-09-04

## 2018-05-21 ASSESSMENT — ENCOUNTER SYMPTOMS
TREMORS: 1
FALLS: 0
NAUSEA: 1
CONSTIPATION: 0
MEMORY LOSS: 0
MYALGIAS: 1

## 2018-05-21 NOTE — PROGRESS NOTES
Subjective:      Mariela Huff is a 57 y.o. female who presents with her  Juan J, for follow-up, with a history of what sounds like has been diagnosed as Parkinson's disease.    HPI    When last seen, Alla had presented with an atypical right sided tremor in the upper extremity with both rest and action components, as well as dystonic cramping of the right lower extremity. The findings on exam are somewhat confusing, I forwarded her to the [facility], the results confirm the presence of suspected Parkinson's disease. I reviewed the reports both from the examining physician as well as diagnostic testing. The latter included Amelia testing showing asymmetric dopamine transporter activity in the putamen bilaterally, left greater than right, as well as neurophysiology movement evaluation with EMG and EEG correlate, revealing increased tone on the right with an action-induced tremor as well as resting tremor consistent with Parkinson's disease, without EEG correlate consistent with nonepileptic involuntary movements. Blood work including Lyme titer, heavy metal screen, vasculitis screens, etc., all were negative/normal.    With the impression, it was recommended that low dose dopamine stimulation be provided, including amantadine, Azilect, etc. Her symptoms, needless to say, have remained the same. The tremors to the point where she would like some symptomatic relief, she recently underwent right foot surgery for hallux valgus, but the right leg cramping sensations have not been affected as well. Now on an antibiotic postoperatively, she is experiencing some nausea and queasiness.    Medical, surgical and family histories are reviewed in the electronic health record, there are no new drug allergies. She is presently on Macrobid as a postoperative antibiotic, Synthroid and Tylenol as needed.    Review of Systems   Constitutional: Negative for malaise/fatigue.   Cardiovascular: Negative for leg swelling.    Gastrointestinal: Positive for nausea. Negative for constipation.   Musculoskeletal: Positive for myalgias. Negative for falls.   Neurological: Positive for tremors.   Psychiatric/Behavioral: Negative for memory loss.   All other systems reviewed and are negative.       Objective:     /60   Pulse (!) 104   Ht 1.524 m (5')      Physical Exam    She appears in no acute distress. Her vital signs are stable though her pulse is slightly elevated at 104, her rhythm is regular. There is no malar rash or sialorrhea, temporal or jaw tenderness, or jaw claudication. The neck is supple, range of motion is full. Cardiac evaluation reveals a regular rhythm. There is no lower extremity edema.    Fully oriented, there is no evidence of focal cognitive or language deficit. PERRLA/EOMI, there is no bradykinesia or hypophonia, facial movements are symmetric, there is no sensory loss across the midline or bulbar dysfunction. Shoulder shrug is symmetric. Mild rigidity and bradykinesia with right upper extremity is unchanged. Minimal tremor at rest is seen, a lower amplitude and higher frequency tremor with action also is still demonstrated. There is no drift. Strength in the upper extremities and left lower extremity are intact. The right lower extremity, distal strength cannot be assessed because of her surgery. There are no obvious reflex asymmetries. There is no incoordination with any of the upper extremities, fine motor control is possibly diminished in frequency with the right hand when compared to the left. Sensory exam is deferred.     Assessment/Plan:     1. Parkinson disease (HCC)  Given the objective findings on the Amelia study, correlating with the right-sided motor symptoms, absent new abnormalities on neurophysiologic study, all of this is consistent with a right-sided eleanor-Parkinson's disease. For now, because her symptoms are disabling enough, mild dopamine supplementation can be instituted, we will use Azilect  0.5 mg daily. She will hold on starting this until she is off the antibiotic since the use of this medicine can cause the same GI symptoms. After one month if there is no real benefit seen, she will increase the medication dose to 1 mg daily. Other side effects were reviewed. Amantadine might be another option for her. I am reluctant to actually start Sinemet, even at its lowest dose, dopamine agonists are not yet indicated.    Face-to-face time was spent again reviewing the nature of the test results obtained at the [facility], and how this correlates with her presentation clinically. Unfortunately, even Parkinson's-plus syndromes can often mimic Parkinson's disease early on, they all can have some benefit with minimal amounts of dopamine stimulation, and thus only over time we will really be able to truly identify what she is suffering from. There is no singular diagnostic test available to us. She was told with the prognosis is over a long interval, she was encouraged to remain active, we can follow-up in 6 months to a year. Phone contact in the interim will be maintained regarding medications.    - Rasagiline Mesylate 0.5 MG Tab; Take 1 Tab by mouth every day.  Dispense: 30 Tab; Refill: 1    Time: Evaluation of 20 minutes for exam come review, discussion, and education  Discussion: As mentioned in assessment, over 60% of the time spent face-to-face counseling and coordinating care

## 2018-05-22 ENCOUNTER — TELEPHONE (OUTPATIENT)
Dept: URGENT CARE | Facility: PHYSICIAN GROUP | Age: 58
End: 2018-05-22

## 2018-05-22 NOTE — TELEPHONE ENCOUNTER
Spoke to patient and informed her of urine culture results, positive for Klebsiella pneumonia, susceptible to current course of Macrobid. Advised to finish full course of antibiotics, she states understanding and appreciates the phone call.

## 2018-05-25 ENCOUNTER — HOSPITAL ENCOUNTER (OUTPATIENT)
Facility: MEDICAL CENTER | Age: 58
End: 2018-05-25
Attending: PHYSICIAN ASSISTANT
Payer: COMMERCIAL

## 2018-05-25 ENCOUNTER — OFFICE VISIT (OUTPATIENT)
Dept: URGENT CARE | Facility: CLINIC | Age: 58
End: 2018-05-25
Payer: COMMERCIAL

## 2018-05-25 VITALS
BODY MASS INDEX: 21.2 KG/M2 | HEIGHT: 60 IN | TEMPERATURE: 98 F | WEIGHT: 108 LBS | SYSTOLIC BLOOD PRESSURE: 110 MMHG | HEART RATE: 70 BPM | DIASTOLIC BLOOD PRESSURE: 70 MMHG | OXYGEN SATURATION: 96 % | RESPIRATION RATE: 16 BRPM

## 2018-05-25 DIAGNOSIS — N30.00 ACUTE CYSTITIS WITHOUT HEMATURIA: ICD-10-CM

## 2018-05-25 LAB
APPEARANCE UR: CLEAR
BILIRUB UR STRIP-MCNC: NEGATIVE MG/DL
COLOR UR AUTO: YELLOW
GLUCOSE UR STRIP.AUTO-MCNC: NEGATIVE MG/DL
KETONES UR STRIP.AUTO-MCNC: NEGATIVE MG/DL
LEUKOCYTE ESTERASE UR QL STRIP.AUTO: NORMAL
NITRITE UR QL STRIP.AUTO: NEGATIVE
PH UR STRIP.AUTO: 6.5 [PH] (ref 5–8)
PROT UR QL STRIP: NEGATIVE MG/DL
RBC UR QL AUTO: NORMAL
SP GR UR STRIP.AUTO: 1
UROBILINOGEN UR STRIP-MCNC: 0.2 MG/DL

## 2018-05-25 PROCEDURE — 81002 URINALYSIS NONAUTO W/O SCOPE: CPT | Performed by: PHYSICIAN ASSISTANT

## 2018-05-25 PROCEDURE — 99000 SPECIMEN HANDLING OFFICE-LAB: CPT | Performed by: PHYSICIAN ASSISTANT

## 2018-05-25 PROCEDURE — 99214 OFFICE O/P EST MOD 30 MIN: CPT | Performed by: PHYSICIAN ASSISTANT

## 2018-05-25 PROCEDURE — 87186 SC STD MICRODIL/AGAR DIL: CPT

## 2018-05-25 PROCEDURE — 87086 URINE CULTURE/COLONY COUNT: CPT

## 2018-05-25 PROCEDURE — 87077 CULTURE AEROBIC IDENTIFY: CPT

## 2018-05-25 RX ORDER — LEVOFLOXACIN 250 MG/1
750 TABLET, FILM COATED ORAL DAILY
Qty: 21 TAB | Refills: 0 | Status: SHIPPED | OUTPATIENT
Start: 2018-05-25 | End: 2018-06-01

## 2018-05-25 ASSESSMENT — ENCOUNTER SYMPTOMS
CHILLS: 0
MYALGIAS: 0
SHORTNESS OF BREATH: 0
VOMITING: 0
BACK PAIN: 0
ABDOMINAL PAIN: 0
FEVER: 0
FLANK PAIN: 0
NAUSEA: 0
DIARRHEA: 0

## 2018-05-25 NOTE — PROGRESS NOTES
"Subjective:   Mariela Huff is a 57 y.o. female who presents for Urinary Frequency (x today, urinary frequency and burning with urination. \"Just finished taking macrobid last Wednesday\")        Urinary Frequency   This is a new problem. The current episode started today. Pertinent negatives include no abdominal pain, chills, fever, myalgias, nausea, rash or vomiting.   Patient was seen approximately 7 days prior for urinary tract infection. She was started on Macrobid. Urine culture came back positive for Klebsiella as well as group B strep. She reports upon finishing Macrobid 2-3 days ago she noticed return of dysuria, frequent urination, urgent urination and cramping. She denies fevers, chills, nausea, vomiting, abdominal pain, diarrhea or back pain. She denies past medical history of complicated urinary tract infection. She denies past medical history of pyelonephritis.    Review of Systems   Constitutional: Negative for chills and fever.   Respiratory: Negative for shortness of breath.    Gastrointestinal: Negative for abdominal pain, diarrhea, nausea and vomiting.   Genitourinary: Positive for dysuria, frequency and urgency. Negative for flank pain and hematuria.   Musculoskeletal: Negative for back pain and myalgias.   Skin: Negative for rash.     Allergies   Allergen Reactions   • Pcn [Penicillins] Rash   • Percocet [Oxycodone-Acetaminophen] Vomiting      Objective:   /70   Pulse 70   Temp 36.7 °C (98 °F)   Resp 16   Ht 1.524 m (5')   Wt 49 kg (108 lb) Comment: per patient, has foot injury  SpO2 96%   BMI 21.09 kg/m²   Physical Exam   Constitutional: She is oriented to person, place, and time. She appears well-developed and well-nourished. No distress.   HENT:   Head: Normocephalic and atraumatic.   Right Ear: External ear normal.   Left Ear: External ear normal.   Nose: Nose normal.   Eyes: Conjunctivae and lids are normal. Right eye exhibits no discharge. Left eye exhibits no " discharge. No scleral icterus.   Neck: Neck supple.   Pulmonary/Chest: Effort normal. No respiratory distress.   Abdominal: Soft. Normal appearance. There is no tenderness. There is no rigidity, no guarding and no CVA tenderness.   Musculoskeletal: Normal range of motion.   Neurological: She is alert and oriented to person, place, and time. She is not disoriented.   Skin: Skin is warm and dry. She is not diaphoretic. No erythema. No pallor.   Psychiatric: Her speech is normal and behavior is normal.   Nursing note and vitals reviewed.  POCT UA -    POC Color YELLOW  Negative Final   POC Appearance CLEAR  Negative Final   POC Leukocyte Esterase MODERATE  Negative Final   POC Nitrites NEGATIVE  Negative Final   POC Urobiligen 0.2  Negative (0.2) mg/dL Final   POC Protein NEGATIVE  Negative mg/dL Final   POC Urine PH 6.5  5.0 - 8.0 Final   POC Blood SMALL  Negative Final   POC Specific Gravity 1.005  <1.005 - >1.030 Final   POC Ketones NEGATIVE  Negative mg/dL Final   POC Bilirubin NEGATIVE  Negative mg/dL Final   POC Glucose NEGATIVE  Negative mg/dL Final     Urine cx pending      Assessment/Plan:   Assessment    1. Acute cystitis without hematuria  - POCT Urinalysis  - levoFLOXacin (LEVAQUIN) 250 MG Tab; Take 3 Tabs by mouth every day for 7 days.  Dispense: 21 Tab; Refill: 0  - URINE CULTURE(NEW); Future  Supportive care is reviewed with patient/caregiver - recommend to push PO fluids and electrolytes, nsaids/tylenol, rest, full course of abx, probiotics w/ abx, azo/cran, observe for resolution  Return to clinic with lack of resolution or progression of symptoms  Will call w/ results of urine cx -- ok to stop abx at 5d if s/sx completely resolved    Suspect GBS under tx'ed w/ macrobid --      Differential diagnosis, natural history, supportive care, and indications for immediate follow-up discussed.

## 2018-05-28 LAB
BACTERIA UR CULT: ABNORMAL
BACTERIA UR CULT: ABNORMAL
SIGNIFICANT IND 70042: ABNORMAL
SITE SITE: ABNORMAL
SOURCE SOURCE: ABNORMAL

## 2018-05-29 ENCOUNTER — TELEPHONE (OUTPATIENT)
Dept: URGENT CARE | Facility: CLINIC | Age: 58
End: 2018-05-29

## 2018-07-17 ENCOUNTER — TELEPHONE (OUTPATIENT)
Dept: NEUROLOGY | Facility: MEDICAL CENTER | Age: 58
End: 2018-07-17

## 2018-07-17 NOTE — TELEPHONE ENCOUNTER
----- Message from Sami Prasad, Med Ass't sent at 7/17/2018  8:06 AM PDT -----  Regarding: FW: Prescription Question      ----- Message -----  From: Mariela Huff  Sent: 7/16/2018  11:31 AM  To: Neurology [facility]  Subject: Prescription Question                            I took the 1/2 mg generic Azilect for three weeks and didn’t notice any change.  I doubled up on it per Dr. Cobos’s suggestion and now after two more weeks have still not noticed any significant improvement in symptoms.  It’s a rather expensive prescription and will run out within a week.  I was wondering if I should try something different rather than refilling this one since I’m not improving with this medication.

## 2018-07-17 NOTE — TELEPHONE ENCOUNTER
Have her stop the medication, even if this were to be effective, the expense will never change. There are others that might prove more beneficial and at a significantly reduced cost. We can talk about this when a follow-up with her.

## 2018-07-20 ENCOUNTER — OFFICE VISIT (OUTPATIENT)
Dept: MEDICAL GROUP | Facility: PHYSICIAN GROUP | Age: 58
End: 2018-07-20
Payer: COMMERCIAL

## 2018-07-20 VITALS
BODY MASS INDEX: 21.79 KG/M2 | HEIGHT: 60 IN | DIASTOLIC BLOOD PRESSURE: 76 MMHG | HEART RATE: 90 BPM | SYSTOLIC BLOOD PRESSURE: 106 MMHG | RESPIRATION RATE: 16 BRPM | TEMPERATURE: 98.6 F | OXYGEN SATURATION: 96 % | WEIGHT: 111 LBS

## 2018-07-20 DIAGNOSIS — M25.50 ARTHRALGIA, UNSPECIFIED JOINT: ICD-10-CM

## 2018-07-20 DIAGNOSIS — G20.A1 PARKINSON DISEASE: ICD-10-CM

## 2018-07-20 DIAGNOSIS — E03.9 HYPOTHYROIDISM, UNSPECIFIED TYPE: ICD-10-CM

## 2018-07-20 PROBLEM — N95.1 MENOPAUSAL HOT FLUSHES: Status: RESOLVED | Noted: 2018-02-28 | Resolved: 2018-07-20

## 2018-07-20 PROBLEM — M20.11 ACQUIRED HALLUX VALGUS OF RIGHT FOOT: Status: RESOLVED | Noted: 2017-05-01 | Resolved: 2018-07-20

## 2018-07-20 PROCEDURE — 99204 OFFICE O/P NEW MOD 45 MIN: CPT | Performed by: INTERNAL MEDICINE

## 2018-07-20 RX ORDER — LEVOTHYROXINE SODIUM 88 UG/1
88 TABLET ORAL
Qty: 90 TAB | Refills: 3 | Status: SHIPPED | OUTPATIENT
Start: 2018-07-20 | End: 2019-08-16 | Stop reason: SDUPTHER

## 2018-07-20 ASSESSMENT — PAIN SCALES - GENERAL: PAINLEVEL: NO PAIN

## 2018-07-20 NOTE — PROGRESS NOTES
New Patient to Establish    Reason to establish: thyroid management, parkinson,     Mariela Terri uHff is a 57 y.o. female here today for evaluation and management of:    Arthralgia  Chronic, mostly feet. She has history of shoulder decompression. She takes tylenol prn, stable.     Hypothyroidism  Chronic, for many years. She has been same dose for many years. She would like to switch to generic levothyroxine. New prescription provided. Last tsh 03/2018. Continue to monitor     Parkinson disease ([facility]) ([facility])  Recent diagnose. She was having balance problems, tremor, small step. She has had surgery on her foot, but still she had small step. She was transferred to [facility] and she had dopamine scan and diagnose with parkinson. She states that writing is worse. She is retired. She is currently on rasagline, and she is following with Dr. Calvillo. She states that current regimen is not helping. Slowly progressively getting worse.       Past Medical History:   Diagnosis Date   • Anesthesia     severe ponv, hard to wake up   • Arthritis     shoulders   • Diverticulosis 2012    pt stated it was noted on colonoscopy   • Hypothyroid     hypothyroid   • Infectious disease     around bronchitis/flu 12/2011   • Lyme disease     chronic   • Pain 04/2018    right foot, bilateral shoulder   • Parkinson disease ([facility])     Tremor on right side of body       Current Outpatient Prescriptions   Medication Sig Dispense Refill   • levothyroxine (SYNTHROID) 88 MCG Tab Take 1 Tab by mouth Every morning on an empty stomach. 90 Tab 3   • Rasagiline Mesylate 0.5 MG Tab Take 1 Tab by mouth every day. 30 Tab 1   • acetaminophen (TYLENOL) 500 MG Tab Take 1,000 mg by mouth every 6 hours as needed.       No current facility-administered medications for this visit.        Allergies as of 07/20/2018 - Reviewed 07/20/2018   Allergen Reaction Noted   • Pcn [penicillins] Rash 11/24/2010   • Percocet [oxycodone-acetaminophen] Vomiting  11/24/2010       Social History     Social History   • Marital status:      Spouse name: N/A   • Number of children: N/A   • Years of education: N/A     Occupational History   • Not on file.     Social History Main Topics   • Smoking status: Never Smoker   • Smokeless tobacco: Never Used   • Alcohol use Yes      Comment: 2 per week   • Drug use: No   • Sexual activity: Yes     Partners: Male     Other Topics Concern   • Not on file     Social History Narrative   • No narrative on file       Family History   Problem Relation Age of Onset   • Cancer Paternal Aunt      breast   • Diabetes Mother    • Thyroid Mother    • Diabetes Father    • Thyroid Daughter    • Thyroid Sister    • Heart Disease Neg Hx    • Stroke Neg Hx    • Alcohol/Drug Neg Hx        Past Surgical History:   Procedure Laterality Date   • TOE FUSION Right 5/7/2018    Procedure: TOE FUSION - 1ST MTP;  Surgeon: Janes Benavidez M.D.;  Location: SURGERY SAME DAY [facility];  Service: Orthopedics   • FLEXOR TENDON REPAIR Right 5/7/2018    Procedure: FLEXOR TENDON REPAIR - 4TH RELEASE W/SOFT TISSUE RELEASE 2/3;  Surgeon: Janes Benavidez M.D.;  Location: SURGERY SAME DAY [facility];  Service: Orthopedics   • HARDWARE REMOVAL ORTHO Right 5/7/2018    Procedure: HARDWARE REMOVAL ORTHO - INTERNAL FIXATION;  Surgeon: Janes Benavidez M.D.;  Location: SURGERY SAME DAY [facility];  Service: Orthopedics   • ORTHOPEDIC OSTEOTOMY Right 5/7/2018    Procedure: ORTHOPEDIC OSTEOTOMY - DISTAL METATARSAL 2/3/4;  Surgeon: Janes Benavidez M.D.;  Location: SURGERY SAME DAY [facility];  Service: Orthopedics   • BUNIONECTOMY Right 5/1/2017    Procedure: BUNIONECTOMY - PROXIMAL HALLUX VALGUS CORRECTION W/BONE GRAFT;  Surgeon: Janes Benavidez M.D.;  Location: SURGERY [facility];  Service:    • NEUROMA EXCISION  5/1/2017    Procedure: NEUROMA EXCISION - 2ND WEBSPACE;  Surgeon: Janes Benavidez M.D.;  Location: SURGERY [facility];  Service:    • BREAST  IMPLANT REVISION Bilateral 4/11/2016    Procedure: BREAST IMPLANT EXCHANGE OF SALINE TO SILICONE W/REPOSITIONING OF LATERAL FOLDS;  Surgeon: Mikey Adkins M.D.;  Location: SURGERY [facility];  Service:    • BREAST BIOPSY  1/24/2012    Performed by SANDRA ESTRADA at SURGERY SAME DAY [facility] ORS   • SHOULDER ARTHROSCOPY  12/1/2010    Performed by KATE CHANCE at SURGERY [facility] ORS   • SHOULDER DECOMPRESSION  12/1/2010    Performed by KATE CHANCE at SURGERY [facility] ORS   • ROTATOR CUFF REPAIR  12/1/2010    Performed by KATE CHANCE at SURGERY [facility] ORS   • SHOULDER DECOMPRESSION ARTHROSCOPIC  5/13/2009    Performed by KATE CHANCE at SURGERY [facility] ORS   • ROTATOR CUFF REPAIR  5/13/2009    Performed by KATE CHANCE at SURGERY [facility]   • HYSTERECTOMY, VAGINAL  1999   • PB ENLARGE BREAST WITH IMPLANT  1999   • OTHER ORTHOPEDIC SURGERY  1993    joint repair left thumb   • US-NEEDLE CORE BX-BREAST PANEL         ROS: All systems reviewed are negative except for HPI    /76   Pulse 90   Temp 37 °C (98.6 °F)   Resp 16   Ht 1.524 m (5')   Wt 50.3 kg (111 lb)   SpO2 96%   Breastfeeding? No   BMI 21.68 kg/m²     Physical Exam  General:  Alert and oriented, No apparent distress.  Eyes: Pupils equal and reactive. No scleral icterus. EOMI  Throat: Clear no erythema or exudates noted. Oral mucosa moist, oral dental intact  Neck: Supple. No cervical or supraclavicular lymphadenopathy noted. Thyroid not enlarged.  Lungs: normal effort,  Clear to auscultation  Cardiovascular: Regular rate and rhythm. No murmurs, rubs or gallops, pulses intact   Abdomen:  Soft, +BS, no tenderness. No rebound or guarding noted. No hepato or splenomegaly   Extremities: No clubbing, cyanosis, edema.  Neuro: cranial nerves intact, sensation intact   Muscle skeletal: no joint swelling, range of motion intact   Skin: Clear. No rash or suspicious skin lesions noted.  Neurologic: Alert &  "oriented x 3   - Language: Normal rhythm and rate, able to repeat the phrase \"No ifs, ands, or buts\"   - Memory, knowledge, attention   - Cranial Nerves: CNII-XII intact. No tongue deviation, Uvula midline, shoulder shrug equal   - Coordination: Finger to nose smooth and intact bilaterally. Heel to shin intact bilaterally.   - Motor: 5/5 in upper extremities and lower extremities   - DTR's: 1+ at patella. Brachial reflexes intact  No cogwheel rigidity, some tremor on the right hand, while she was talking, slight bradykinesia    - Sensation: Tactile sensation intact throughout, Vibration sensation intact throughout      Assessment and Plan    1. Hypothyroidism, unspecified type  Switch to generic. Continue to monitor.   - TSH WITH REFLEX TO FT4; Future    2. Parkinson disease (HCC)  Follow up with neurology.     3. Arthralgia, unspecified joint  Stable. Tylenol prn       Followup: Return in about 6 months (around 1/20/2019), or if symptoms worsen or fail to improve, for Short, annual exam.      Signed by: Sal Ponce M.D.  "

## 2018-07-20 NOTE — ASSESSMENT & PLAN NOTE
Recent diagnose. She was having balance problems, tremor, small step. She has had surgery on her foot, but still she had small step. She was transferred to [facility] and she had dopamine scan and diagnose with parkinson. She states that writing is worse. She is retired. She is currently on rasagline, and she is following with Dr. Calvillo. She states that current regimen is not helping. Slowly progressively getting worse.

## 2018-07-20 NOTE — ASSESSMENT & PLAN NOTE
Chronic, for many years. She has been same dose for many years. She would like to switch to generic levothyroxine. New prescription provided. Last tsh 03/2018. Continue to monitor

## 2018-07-20 NOTE — LETTER
[facility]  Sal Ponce M.D.  [address]  [place] [address]  Fax: [phone]   Authorization for Release/Disclosure of   Protected Health Information   Name: ROBBY COATES : 1960 SSN: [id number]   Address: [address] [place] [address]  [place] [address] Phone:    [phone] (home)    I authorize the entity listed below to release/disclose the PHI below to:   [facility]/Sal Ponce M.D. and Sal Ponce M.D.   Provider or Entity Name:  [facility]   Address   [facility], [address]            [address], [place] [address] Phone:  [phone]      Fax:       [phone]          Reason for request: continuity of care   Information to be released:    [ X ] LAST COLONOSCOPY,  including any PATH REPORT and follow-up  [ X ] LAST FIT/COLOGUARD RESULT [  ] LAST DEXA  [  ] LAST MAMMOGRAM  [  ] LAST PAP  [  ] LAST LABS [  ] RETINA EXAM REPORT  [  ] IMMUNIZATION RECORDS  [  ] Release all info      [  ] Check here and initial the line next to each item to release ALL health information INCLUDING  _____ Care and treatment for drug and / or alcohol abuse  _____ HIV testing, infection status, or AIDS  _____ Genetic Testing    DATES OF SERVICE OR TIME PERIOD TO BE DISCLOSED: _____________  I understand and acknowledge that:  * This Authorization may be revoked at any time by you in writing, except if your health information has already been used or disclosed.  * Your health information that will be used or disclosed as a result of you signing this authorization could be re-disclosed by the recipient. If this occurs, your re-disclosed health information may no longer be protected by State or Federal laws.  * You may refuse to sign this Authorization. Your refusal will not affect your ability to obtain treatment.  * This Authorization becomes effective upon signing and will  on (date) __________.      If no date is indicated, this Authorization will  one (1) year from the  signature date.    Name: Mariela Huff    Signature:   Date:     7/20/2018       PLEASE FAX REQUESTED RECORDS BACK TO: [phone]

## 2018-07-24 ENCOUNTER — APPOINTMENT (RX ONLY)
Dept: URBAN - METROPOLITAN AREA CLINIC 20 | Facility: CLINIC | Age: 58
Setting detail: DERMATOLOGY
End: 2018-07-24

## 2018-07-24 DIAGNOSIS — L57.8 OTHER SKIN CHANGES DUE TO CHRONIC EXPOSURE TO NONIONIZING RADIATION: ICD-10-CM

## 2018-07-24 DIAGNOSIS — D22 MELANOCYTIC NEVI: ICD-10-CM

## 2018-07-24 DIAGNOSIS — L81.4 OTHER MELANIN HYPERPIGMENTATION: ICD-10-CM

## 2018-07-24 DIAGNOSIS — L82.1 OTHER SEBORRHEIC KERATOSIS: ICD-10-CM

## 2018-07-24 DIAGNOSIS — L82.0 INFLAMED SEBORRHEIC KERATOSIS: ICD-10-CM

## 2018-07-24 DIAGNOSIS — D18.0 HEMANGIOMA: ICD-10-CM

## 2018-07-24 PROBLEM — D22.5 MELANOCYTIC NEVI OF TRUNK: Status: ACTIVE | Noted: 2018-07-24

## 2018-07-24 PROBLEM — D18.01 HEMANGIOMA OF SKIN AND SUBCUTANEOUS TISSUE: Status: ACTIVE | Noted: 2018-07-24

## 2018-07-24 PROCEDURE — 99214 OFFICE O/P EST MOD 30 MIN: CPT | Mod: 25

## 2018-07-24 PROCEDURE — ? LIQUID NITROGEN

## 2018-07-24 PROCEDURE — ? COUNSELING

## 2018-07-24 PROCEDURE — 17110 DESTRUCTION B9 LES UP TO 14: CPT

## 2018-07-24 ASSESSMENT — LOCATION DETAILED DESCRIPTION DERM
LOCATION DETAILED: RIGHT CENTRAL MALAR CHEEK
LOCATION DETAILED: LEFT MEDIAL UPPER BACK
LOCATION DETAILED: LEFT ANTERIOR PROXIMAL UPPER ARM
LOCATION DETAILED: RIGHT ANTERIOR PROXIMAL THIGH
LOCATION DETAILED: LEFT INFERIOR UPPER BACK
LOCATION DETAILED: RIGHT ANTERIOR PROXIMAL UPPER ARM
LOCATION DETAILED: LEFT INFERIOR CENTRAL MALAR CHEEK
LOCATION DETAILED: RIGHT ANTERIOR DISTAL THIGH
LOCATION DETAILED: RIGHT SUPERIOR MEDIAL UPPER BACK
LOCATION DETAILED: RIGHT INFERIOR UPPER BACK
LOCATION DETAILED: RIGHT PROXIMAL DORSAL FOREARM
LOCATION DETAILED: LEFT PROXIMAL DORSAL FOREARM
LOCATION DETAILED: RIGHT RIB CAGE
LOCATION DETAILED: LEFT ANTERIOR DISTAL THIGH
LOCATION DETAILED: RIGHT MID-UPPER BACK
LOCATION DETAILED: RIGHT INFERIOR MEDIAL UPPER BACK
LOCATION DETAILED: LEFT MEDIAL SUPERIOR CHEST

## 2018-07-24 ASSESSMENT — LOCATION ZONE DERM
LOCATION ZONE: LEG
LOCATION ZONE: ARM
LOCATION ZONE: TRUNK
LOCATION ZONE: FACE

## 2018-07-24 ASSESSMENT — LOCATION SIMPLE DESCRIPTION DERM
LOCATION SIMPLE: LEFT THIGH
LOCATION SIMPLE: RIGHT CHEEK
LOCATION SIMPLE: LEFT UPPER ARM
LOCATION SIMPLE: LEFT UPPER BACK
LOCATION SIMPLE: RIGHT THIGH
LOCATION SIMPLE: RIGHT UPPER ARM
LOCATION SIMPLE: RIGHT UPPER BACK
LOCATION SIMPLE: RIGHT FOREARM
LOCATION SIMPLE: ABDOMEN
LOCATION SIMPLE: LEFT CHEEK
LOCATION SIMPLE: CHEST
LOCATION SIMPLE: LEFT FOREARM

## 2018-09-04 ENCOUNTER — OFFICE VISIT (OUTPATIENT)
Dept: NEUROLOGY | Facility: MEDICAL CENTER | Age: 58
End: 2018-09-04
Payer: COMMERCIAL

## 2018-09-04 VITALS
OXYGEN SATURATION: 97 % | SYSTOLIC BLOOD PRESSURE: 108 MMHG | HEIGHT: 60 IN | RESPIRATION RATE: 16 BRPM | HEART RATE: 83 BPM | TEMPERATURE: 97.8 F | DIASTOLIC BLOOD PRESSURE: 62 MMHG | BODY MASS INDEX: 22.1 KG/M2 | WEIGHT: 112.6 LBS

## 2018-09-04 DIAGNOSIS — G20.A1 PARKINSON DISEASE: ICD-10-CM

## 2018-09-04 PROCEDURE — 99214 OFFICE O/P EST MOD 30 MIN: CPT | Performed by: PSYCHIATRY & NEUROLOGY

## 2018-09-04 RX ORDER — CARBIDOPA, LEVODOPA AND ENTACAPONE 25; 200; 100 MG/1; MG/1; MG/1
1 TABLET, FILM COATED ORAL 2 TIMES DAILY
Qty: 60 TAB | Refills: 6 | Status: SHIPPED | OUTPATIENT
Start: 2018-09-04 | End: 2019-03-04

## 2018-09-04 ASSESSMENT — ENCOUNTER SYMPTOMS
DIZZINESS: 0
CONSTIPATION: 0
LOSS OF CONSCIOUSNESS: 0
NAUSEA: 0
MEMORY LOSS: 0
DEPRESSION: 0
TREMORS: 1
FALLS: 0

## 2018-09-04 ASSESSMENT — PATIENT HEALTH QUESTIONNAIRE - PHQ9: CLINICAL INTERPRETATION OF PHQ2 SCORE: 0

## 2018-09-04 ASSESSMENT — PAIN SCALES - GENERAL: PAINLEVEL: 3=SLIGHT PAIN

## 2018-09-04 NOTE — PROGRESS NOTES
Subjective:      Mariela Huff is a 57 y.o. female who presents for follow-up, with a history of mild, atypical Parkinson's disease.    HPI    Since last seen, we had started Azilect 1 mg daily, she did not notice any real benefit with the drug, when she stopped the drug there was no increase in the symptoms she was experiencing. The tremor with the right hand did not diminish on drug, now off the drug, it remains about the same. The stiffness and difficulty moving the right leg seemed to got a little worse. She is not falling with regularity. She goes to the gym, she denies weakness, the problem is the foot seems to walk stiffly, there is no longer the smooth progression of heel to toe, it is simply being placed in a stiffened 90° angle with the ankle. She is not tripped.    Cognitively things are stable. She denies issues with excessive drooling, swallowing, loss of voice volume or clarity, constipation, weight loss, etc. There is no significant loss of dexterity that she notices movements with the hand on the right are slowed when compared to the left.    Medical, surgical and family histories are reviewed in electronic health record, there are no new drug allergies. She is on Synthroid and Tylenol as needed.    Review of Systems   Gastrointestinal: Negative for constipation and nausea.   Musculoskeletal: Negative for falls.   Neurological: Positive for tremors. Negative for dizziness and loss of consciousness.   Psychiatric/Behavioral: Negative for depression and memory loss.   All other systems reviewed and are negative.       Objective:     /62   Pulse 83   Temp 36.6 °C (97.8 °F)   Resp 16   Ht 1.524 m (5')   Wt 51.1 kg (112 lb 9.6 oz)   SpO2 97%   BMI 21.99 kg/m²      Physical Exam    She appears in no acute distress. Her vital signs are stable. There is no malar rash. The neck is supple, there is no sialorrhea. Cardiac evaluation is unremarkable. There is no edema.    Cognition is  intact, she is fully oriented, there is no aphasia. He was mild hypophonia, PERRLA/EOMI, visual fields are full, facial movements are symmetric and there is no sensory loss across midline with both sides of the face. Fine tremulousness seen in both hands but mostly with sustained posture against gravity. There is no clear tremor in with right hand at rest. Rigidity is mostly right-sided. Right-sided bradykinesia is mild but remains. She stands easily, when she walks the right arm swing is diminished when compared to the left, she does walk with a little bit of stiffness and limited range of motion with right ankle and foot, but she requires only 3 steps to turn, she does not shuffle, stride length is maintained. There is no appendicular dystaxia in any extremities. Repetitive movements do show bilateral reduction in amplitude, slower frequency with the right hand when compared to the left.     Assessment/Plan:     1. Parkinson disease (HCC)  The symptoms are still minimal, this may be part of the reason why Azilect did not seem to provide therapeutic benefit but again this would been minimal anyway. Stalevo-100 daily will be started, in 2 weeks she was told she can increase to twice a day dosing, and if necessary in another 2 weeks 3 times a day. Side effects were reviewed. We talked about how the drug works, she was also told that this is the medication that will provide the benefit most consistently. If she is not convinced, quite often patients noticed a loss of efficacy more easily, so she was told to stop the drug and follow along. She was reassured that the drug is safe to take, her disease is so mild that she will likely require minimal dose for some time into the foreseeable future. We can follow-up in 6 months, phone contact in the interim.    Time: 25 minutes was spent face-to-face for exam come review, discussion, and education, of this 50% of the time spent counseling and coordinating care

## 2018-09-14 ENCOUNTER — TELEPHONE (OUTPATIENT)
Dept: NEUROLOGY | Facility: MEDICAL CENTER | Age: 58
End: 2018-09-14

## 2018-09-15 NOTE — TELEPHONE ENCOUNTER
----- Message from Sami Prasad, Med Ass't sent at 9/13/2018  3:49 PM PDT -----  Regarding: FW: Prescription Question  Contact: [phone]      ----- Message -----  From: Mariela Huff  Sent: 9/11/2018   1:50 PM  To: Neurology [facility]  Subject: Prescription Question                            I have a general question about the medication recently prescribed. I did some initial research to help me understand what I’m taking (which the bottle says is the generic for Stalevo). I’ve often heard of Sinemet as being the drug of choice. I even found one site that showed more side affects when using Stalevo (carbidopa/levodopa plus entacapone) than those taking something similar to Sinemet (carbidopa/levodopa plus a placebo). I’m not necessarily asking that my medication be changed but I am trying to understand the rationale for prescribing this one if research shows the other is effective without as many side affects. Currently I’m taking one pill a day and noticing minimal improvements and may increase to 2 a day after two weeks.

## 2018-09-15 NOTE — TELEPHONE ENCOUNTER
Matilde tell the patient that I cannot have long conversations or consultations about medications over the phone or via the web. If she has questions or concerns, she can either try to increase and follow my previous instructions or she can wait until we follow-up in the office for further conversation, continuing the drug at a lower dose. I am pleased to hear that the drug is helping her feel better, I suspect she will continue to improve if she does go up on the drug.

## 2018-11-12 ENCOUNTER — TELEPHONE (OUTPATIENT)
Dept: NEUROLOGY | Facility: MEDICAL CENTER | Age: 58
End: 2018-11-12

## 2018-11-13 NOTE — TELEPHONE ENCOUNTER
For now, it is safe to simply stay off the medication.  Being off the drug will not result in a more rapid deterioration of this illness.

## 2018-11-20 ENCOUNTER — TELEPHONE (OUTPATIENT)
Dept: NEUROLOGY | Facility: MEDICAL CENTER | Age: 58
End: 2018-11-20

## 2018-11-20 DIAGNOSIS — G20.A1 PARKINSON DISEASE: ICD-10-CM

## 2018-11-20 RX ORDER — AMANTADINE HYDROCHLORIDE 100 MG/1
100 CAPSULE, GELATIN COATED ORAL 2 TIMES DAILY
Qty: 60 CAP | Refills: 5 | Status: SHIPPED | OUTPATIENT
Start: 2018-11-20 | End: 2019-03-04

## 2018-11-20 NOTE — TELEPHONE ENCOUNTER
I am not aware of any sustained benefit having been achieved on study using homeopathic herbal remedies such as what she mentioned, even thiamine, though an established anti-inflammatory, has not been shown to provide long-term benefit.  Thiamine is certainly safe to take, I simply cannot recommended being used.  I am willing to try one other treatment in the interim until her follow-up appointment, but I am not comfortable continuing to try multiple medications via phone message and online communications.  Again, her symptoms are very mild, we have already tried dopamine with little benefit being seen, amantadine 100 mg, twice daily, can be tried, but if she is not clear about efficacy, she can stop the medicine and we can talk further in March, 2019.

## 2018-11-20 NOTE — TELEPHONE ENCOUNTER
----- Message from Sami Prasad, Med Ass't sent at 11/20/2018  9:51 AM PST -----  Regarding: FW: Prescription Question  Contact: [phone]      ----- Message -----  From: Mariela Huff  Sent: 11/13/2018   7:15 PM  To: Neurology [facility]  Subject: Prescription Question                            The response to my previous question that it's okay to stop the medication is great but I also asked where we go from here because my symptoms didn't go away.  In looking back at the notes from the [facility], they also suggested amantadine or ropinirole might be helpful before advancing to the levodopa like we tried.  Are there any natural remedies that might help? I've read about the herb mucuna pruriens that many find helpful.  Also others have mentioned thiamine helps by reducing inflammation.  Does Dr. Cobos have any experience or knowledge of these? I was hopeful I could get some type of more comprehensive response since my next appointment is not until March and it's difficult at best to get in otherwise.

## 2019-01-17 ENCOUNTER — TELEPHONE (OUTPATIENT)
Dept: NEUROLOGY | Facility: MEDICAL CENTER | Age: 59
End: 2019-01-17

## 2019-03-04 ENCOUNTER — OFFICE VISIT (OUTPATIENT)
Dept: NEUROLOGY | Facility: MEDICAL CENTER | Age: 59
End: 2019-03-04
Payer: COMMERCIAL

## 2019-03-04 VITALS
SYSTOLIC BLOOD PRESSURE: 104 MMHG | WEIGHT: 111.4 LBS | HEIGHT: 60 IN | BODY MASS INDEX: 21.87 KG/M2 | RESPIRATION RATE: 14 BRPM | DIASTOLIC BLOOD PRESSURE: 76 MMHG | OXYGEN SATURATION: 97 % | HEART RATE: 87 BPM | TEMPERATURE: 97.1 F

## 2019-03-04 DIAGNOSIS — G20.C PARKINSONISM, UNSPECIFIED PARKINSONISM TYPE: Primary | ICD-10-CM

## 2019-03-04 PROCEDURE — 99214 OFFICE O/P EST MOD 30 MIN: CPT | Performed by: PSYCHIATRY & NEUROLOGY

## 2019-03-04 RX ORDER — TRIHEXYPHENIDYL HYDROCHLORIDE 2 MG/1
2 TABLET ORAL 3 TIMES DAILY
Qty: 90 TAB | Refills: 3 | Status: SHIPPED | OUTPATIENT
Start: 2019-03-04 | End: 2019-07-12

## 2019-03-04 ASSESSMENT — ENCOUNTER SYMPTOMS
FALLS: 0
DOUBLE VISION: 0
LOSS OF CONSCIOUSNESS: 0
TREMORS: 1
CONSTIPATION: 0
MEMORY LOSS: 0

## 2019-03-04 ASSESSMENT — PATIENT HEALTH QUESTIONNAIRE - PHQ9: CLINICAL INTERPRETATION OF PHQ2 SCORE: 0

## 2019-03-04 ASSESSMENT — PAIN SCALES - GENERAL: PAINLEVEL: 3=SLIGHT PAIN

## 2019-03-04 NOTE — PROGRESS NOTES
Subjective:      Mariela Huff is a 58 y.o. 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    HPI    Unfortunately, when last seen 6 months ago, we had tried Alla on Stalevo-100, 3 times daily, though she initially felt it might have helped to the tremor and stiffness, within 3 weeks she began to develop dizziness and lightheadedness, the tremor itself seemed to be worse, so she stopped the drug.  Things stabilized, we then attempted Symmetrel 100 mg, twice daily, but again she developed profound side effects.  She has been off the drug ever since 1 month ago.    Right side still remains problematic, she denies any weakness or loss of dexterity, stiffness, etc. with the left arm and leg.  She has noted the right hand can cramp up to a degree if she is distracted, when walking, the left foot is now beginning to show signs of cramping and which she describes is dystonia, and with the ankle already weakened after 2 ankle surgeries, this results in an external rotation of the toes and in inverted walking with the ankle.  Though she has an ankle brace, this is beginning to get in the way.  She can walk maybe for 1 hour before she has to stop because of discomfort.  This dystonic cramping sensation can also occur at nighttime when she is at bed and asleep, though it does not awaken her, she simply notes the foot acting up when she awakens for other reasons.    Tremor in the right hand is still there, the dexterity and loss of hand strength more problematic.  She has not fallen, from day-to-day symptoms are about the same.  Cognitively she is intact, she denies pseudobulbar affect, visual loss or double vision, slurring of speech, change in voice volume, or swallowing difficulties.  There has never been an issue with drooling.  Bowel and bladder function are stable, she denies orthostatic dizziness, syncope, or loss of appetite because  of early satiety with nausea.    Medical, surgical and family histories are reviewed in the electronic health record, there are no new drug allergies, no new diagnoses documented.  At present she is simply on Synthroid and Tylenol.    Review of Systems   Constitutional: Positive for malaise/fatigue.   Eyes: Negative for double vision.   Cardiovascular: Negative for leg swelling.   Gastrointestinal: Negative for constipation.   Genitourinary: Negative for dysuria.   Musculoskeletal: Negative for falls.   Neurological: Positive for tremors. Negative for loss of consciousness.   Psychiatric/Behavioral: Negative for memory loss.   All other systems reviewed and are negative.       Objective:     /76 (BP Location: Left arm, Patient Position: Sitting)   Pulse 87   Temp 36.2 °C (97.1 °F) (Temporal)   Resp 14   Ht 1.524 m (5')   Wt 50.5 kg (111 lb 6.4 oz)   SpO2 97%   BMI 21.76 kg/m²      Physical Exam    As always, she appears in no acute distress.  Her vital signs are stable.  There is no malar rash or sialorrhea.  The neck is supple, range of motion is full.  Cardiac evaluation reveals a regular rhythm.  There is no lower extremity edema.    She is fully oriented, there is no aphasia, apraxia, or inattention.  Mental processing is intact.  Her mood is euthymic, affect is mood appropriate, it is not labile.    PERRLA/EOMI, visual fields are full, there is mild bradykinesia and hypophonia, no tachyphemia or dysarthria.  Facial movements are symmetric, sensory exam is intact to temperature bilaterally.  The tongue and uvula are midline.    Tremulousness with the right hand with some grasping is seen spontaneously at rest, this improves when she uses the extremity.  Rigidity is still present and unchanged with the right upper extremity and right lower extremity.  Some dystonia with external rotation of the right foot can be seen.  There is no spasticity.  Strength is intact bilaterally.  Reflexes are brisk  throughout, there are no asymmetries from right to left, both toes are downgoing.    When she walks there is still the stiffness with the right leg, she has to walk flat footed, watching where she puts the foot to avoid ankle rotation.  Movements with the left foot are normal.  Arm swing is diminished on the right, elbow flexed and little movement with hand.  These movements are intact with the left upper extremity.    Sensory exam is grossly intact to vibration.     Assessment/Plan:     1. Parkinsonism, unspecified Parkinsonism type (HCC)  As things progress, I am beginning to wonder if she does present with an atypical type of Parkinson's disease, possible rigid akinetic, versus a Parkinson's-plus syndrome such as striatonigral degeneration, PSP, etc.  I doubt that this is Lewy body dementia given the clinical setting absent primary complaints of cognitive impairment within a year of motor symptoms beginning, she has none of the autonomic symptoms that would suggest multiple system atrophy, parkinsonian variant.  Still, whenever this proves to be, it will likely be a degenerative illness, and all we can do is treat symptomatically when possible.    We again spent a long time talking about the differences between primary Parkinson's disease and other parkinsonian syndromes.  We could retry dopamine into the future, but we would have to move very slowly.  For now I will try a whole different approach to at least deal with the right lower extremity and hand dystonic cramping symptoms.  And anti-spasmolytic such as baclofen or anticholinergic such as Artane or Cogentin are all legitimate options.    Artane 2 mg, daily will be started, this can be increased as needed up to 3 times a day.  Side effects were reviewed.  Phone tag in the interim, we will follow-up in 6 months.    - trihexyphenidyl (ARTANE) 2 MG Tab; Take 1 Tab by mouth 3 times a day.  Dispense: 90 Tab; Refill: 3    Time: 25 minutes spent face-to-face for  exam, review, discussion, and education, of this over 50% of the time spent counseling and coordinating care surrounding all of the above issues.

## 2019-05-14 ENCOUNTER — HOSPITAL ENCOUNTER (OUTPATIENT)
Dept: RADIOLOGY | Facility: MEDICAL CENTER | Age: 59
End: 2019-05-14
Attending: INTERNAL MEDICINE
Payer: COMMERCIAL

## 2019-05-14 DIAGNOSIS — Z12.31 VISIT FOR SCREENING MAMMOGRAM: ICD-10-CM

## 2019-05-14 PROCEDURE — 77063 BREAST TOMOSYNTHESIS BI: CPT

## 2019-06-13 ENCOUNTER — OFFICE VISIT (OUTPATIENT)
Dept: NEUROLOGY | Facility: MEDICAL CENTER | Age: 59
End: 2019-06-13
Payer: COMMERCIAL

## 2019-06-13 VITALS
DIASTOLIC BLOOD PRESSURE: 72 MMHG | TEMPERATURE: 98.6 F | HEART RATE: 78 BPM | SYSTOLIC BLOOD PRESSURE: 110 MMHG | WEIGHT: 109.6 LBS | BODY MASS INDEX: 21.52 KG/M2 | OXYGEN SATURATION: 96 % | HEIGHT: 60 IN

## 2019-06-13 DIAGNOSIS — G20.C PARKINSONISM, UNSPECIFIED PARKINSONISM TYPE: ICD-10-CM

## 2019-06-13 PROCEDURE — 99214 OFFICE O/P EST MOD 30 MIN: CPT | Performed by: PSYCHIATRY & NEUROLOGY

## 2019-06-13 ASSESSMENT — ENCOUNTER SYMPTOMS
TREMORS: 1
SEIZURES: 0
MYALGIAS: 0
CONSTIPATION: 0
LOSS OF CONSCIOUSNESS: 0
HEADACHES: 0
MEMORY LOSS: 0

## 2019-06-13 NOTE — PROGRESS NOTES
Subjective:      Mariela Huff is a 58 y.o. female who presents for six-month follow-up, with a history of atypical right-sided eleanor-parkinsonism.    HPI    When last seen, having failed several trials of dopaminergic drugs, including Sinemet, then having also failed Symmetrel, Artane 2 mg was started, now at twice daily dosing, taken at 7 AM and about 4 PM, she has been doing much better.  The tightness and stiffness in the muscles of the proximal arm and leg has improved, this makes her walking and movements in general easier.  The tremor seems to have attenuated slightly though tremor was never a big issue.  She tolerates the drug except for some dry mouth.  Though I had encouraged her to increase the dose to 3 times a day, she never did so.  She does feel some on effect within an hour of each dose, it does not wear off until the morning following the longer interval after the 4 PM dose.  She continues with her physical therapy.    She still has the cramping sensations with the right foot, though the stiffness in the muscles and the inversion of the foot at the ankle has improved so the pain is less intense, she can flatten the foot more easily when she walks on it.    Medical, surgical and family histories are reviewed in the electronic health record, there are no new drug allergies.  She is on Artane 2 mg, twice daily, Synthroid, and Tylenol.    Review of Systems   Gastrointestinal: Negative for constipation.   Genitourinary: Negative for dysuria.   Musculoskeletal: Negative for myalgias.   Neurological: Positive for tremors. Negative for seizures, loss of consciousness and headaches.   Psychiatric/Behavioral: Negative for memory loss.   All other systems reviewed and are negative.       Objective:     /72   Pulse 78   Temp 37 °C (98.6 °F) (Temporal)   Ht 1.524 m (5')   Wt 49.7 kg (109 lb 9.6 oz)   SpO2 96%   BMI 21.40 kg/m²      Physical Exam    She appears in no acute distress.  Vital  signs are stable.  There is no malar rash.  There is no sialorrhea.  The neck is supple.  Cardiac evaluation is unremarkable.    Cognition is intact.  There is no aphasia.  PERRLA/EOMI, there is only minimal facial bradykinesia, voice volume and quality are maintained.  Facial movements are symmetric.  The tongue and uvula are midline.  The right shoulder shrug is a little slowed on initiation when compared to the left, range of motion is full.    Musculoskeletal exam does reveal a reduction in rigidity on the right side, bradykinesia is slightly improved in the right arm and leg.  Strength is intact.  There is no tremor at rest.    When she stands to walk, it is a little easier, the right leg is still moved in a stiff and rigid fashion though movements in general are more fluid with less hesitancy.  Fine motor control in the right hand is improved, almost comparable to the left.  Right arm swing is much improved.  There is no appendicular dystaxia.     Assessment/Plan:     1. Parkinsonism, unspecified Parkinsonism type (HCC)  We will continue her regimen, I did encourage her to add another Artane dose in the middle of the day, as her disease is milder, the likelihood of notable benefit is higher, and she should do so to further improve her quality of life and functional status.  She will continue physical therapy, we will send her for some more focused therapy especially when it comes to the right ankle given the dystonia and pain that is resulting.  I am not sure if Botox injections will prove beneficial at this time.  We will follow-up in 6 months, phone contact in the interim.    - REFERRAL TO PHYSICAL THERAPY Reason for Therapy: Eval/Treat/Report    Time: 25 minute spent face-to-face for exam, review, discussion, and education, of this over 50% of the time spent counseling and coordinating care surrounding all of the above issues.   No complaints

## 2019-06-27 ENCOUNTER — PHYSICAL THERAPY (OUTPATIENT)
Dept: PHYSICAL THERAPY | Facility: REHABILITATION | Age: 59
End: 2019-06-27
Attending: PSYCHIATRY & NEUROLOGY
Payer: COMMERCIAL

## 2019-06-27 DIAGNOSIS — G20.C PARKINSONISM, UNSPECIFIED PARKINSONISM TYPE: ICD-10-CM

## 2019-06-27 PROCEDURE — 97110 THERAPEUTIC EXERCISES: CPT

## 2019-06-27 PROCEDURE — 97162 PT EVAL MOD COMPLEX 30 MIN: CPT

## 2019-06-27 ASSESSMENT — ENCOUNTER SYMPTOMS
EXACERBATED BY: RUNNING
EXACERBATED BY: STAIR CLIMBING
QUALITY: SQUEEZING
PAIN TIMING: WHEN ACTIVE
QUALITY: PRESSURE
PAIN SCALE: 4
EXACERBATED BY: WALKING

## 2019-06-27 ASSESSMENT — BALANCE ASSESSMENTS
BALANCE - STANDING STATIC: GOOD
BALANCE - STANDING DYNAMIC: GOOD
BALANCE - SITTING DYNAMIC: NORMAL
BALANCE - SITTING STATIC: NORMAL

## 2019-06-27 ASSESSMENT — ACTIVITIES OF DAILY LIVING (ADL): AMBULATION_WITHOUT_ASSISTIVE_DEVICE: INDEPENDENT

## 2019-06-27 NOTE — OP THERAPY EVALUATION
Outpatient Physical Therapy  INITIAL NEUROLOGICAL EVALUATION    [facility] Physical Therapy [address]  [address]  [place] [address]  Phone:  [phone]  Fax:  [phone]    Date of Evaluation: 2019    Patient: Mariela Huff  YOB: 1960  MRN: [id number]     Referring Provider: Austyn Calvillo M.D.  [address] [place] [address], [place] [address]   Referring Diagnosis Parkinsonism, unspecified Parkinsonism type (HCC) [G20]     Time Calculation             Physical Therapy Occurrence Codes    Date of onset of impairment:  18   Date physical therapy care plan established or reviewed:  19   Date physical therapy treatment started:  19          Chief Complaint: Ankle Problem    Visit Diagnoses     ICD-10-CM   1. Parkinsonism, unspecified Parkinsonism type (HCC) G20       Subjective:   History of Present Illness:     Date of surgery:  2018    Mechanism of injury:  Diagnosed with Parkinsonism about 2.5 years ago.  In 2017, had R bunionectomy. Numbness in R foot due to removed neuroma.   NWB for 6 weeks.  1st toe continued to deviate medially, so had R 1st toe fusion on 18.  Resulted in drop foot.  Pt was NWB for 6 weeks following this surgery as well.  Also had dx of chronic lyme disease, pt states her neurologic/Parkinsonism symptoms may be related to the lyme disease.    Chief complaint is lack of active ROM of R toes, and curling and cramping of toes with walking 3-4 minutes.  Pt wears R AFO for longer walking.  Walking slowly not as bad, does not lead to cramping as much.  Pt reports when she walks, she first gets fatigued in tibialis anterior, then toes start to curl.  Three days a week- one hour rock steady boxing.  Exacerbates cramping and curling of toes and pain in ball of toes.  Pain:     Current pain ratin    Location:  Ball of R foot under 4th toe    Quality:  Squeezing and pressure    Pain timing:  When active    Relieving  factors:  Rest    Aggravating factors:  Walking, running and stair climbing    Progression:  Stable  Social Support:     Lives in:  Multiple-level home  Treatments:     Previous treatment:  Physical therapy  Patient Goals:     Patient goals for therapy:  Return to sport/leisure activities and increased motion    Other patient goals:  Improved walking      Past Medical History:   Diagnosis Date   • Anesthesia     severe ponv, hard to wake up   • Arthritis     shoulders   • Diverticulosis 2012    pt stated it was noted on colonoscopy   • Hypothyroid     hypothyroid   • Infectious disease     around bronchitis/flu 12/2011   • Lyme disease     chronic   • Pain 04/2018    right foot, bilateral shoulder   • Parkinsonism (HCC) 1/24/2018     Past Surgical History:   Procedure Laterality Date   • TOE FUSION Right 5/7/2018    Procedure: TOE FUSION - 1ST MTP;  Surgeon: Janes Benavidez M.D.;  Location: SURGERY SAME DAY [facility];  Service: Orthopedics   • FLEXOR TENDON REPAIR Right 5/7/2018    Procedure: FLEXOR TENDON REPAIR - 4TH RELEASE W/SOFT TISSUE RELEASE 2/3;  Surgeon: Janes Benavidez M.D.;  Location: SURGERY SAME DAY [facility];  Service: Orthopedics   • HARDWARE REMOVAL ORTHO Right 5/7/2018    Procedure: HARDWARE REMOVAL ORTHO - INTERNAL FIXATION;  Surgeon: Janes Benavidez M.D.;  Location: SURGERY SAME DAY [facility];  Service: Orthopedics   • ORTHOPEDIC OSTEOTOMY Right 5/7/2018    Procedure: ORTHOPEDIC OSTEOTOMY - DISTAL METATARSAL 2/3/4;  Surgeon: Janes Benavidez M.D.;  Location: SURGERY SAME DAY [facility];  Service: Orthopedics   • BUNIONECTOMY Right 5/1/2017    Procedure: BUNIONECTOMY - PROXIMAL HALLUX VALGUS CORRECTION W/BONE GRAFT;  Surgeon: Janes Benavidez M.D.;  Location: SURGERY [facility];  Service:    • NEUROMA EXCISION  5/1/2017    Procedure: NEUROMA EXCISION - 2ND WEBSPACE;  Surgeon: Janes Benavidez M.D.;  Location: SURGERY [facility];  Service:    • BREAST IMPLANT REVISION  Bilateral 4/11/2016    Procedure: BREAST IMPLANT EXCHANGE OF SALINE TO SILICONE W/REPOSITIONING OF LATERAL FOLDS;  Surgeon: Mikey Adkins M.D.;  Location: SURGERY [facility];  Service:    • BREAST BIOPSY  1/24/2012    Performed by SANDRA ESTRADA at SURGERY SAME DAY [facility]   • SHOULDER ARTHROSCOPY  12/1/2010    Performed by KATE CHANCE at SURGERY [facility]S   • SHOULDER DECOMPRESSION  12/1/2010    Performed by AKTE CHANCE at SURGERY [facility]   • ROTATOR CUFF REPAIR  12/1/2010    Performed by KATE CHANCE at SURGERY [facility]   • SHOULDER DECOMPRESSION ARTHROSCOPIC  5/13/2009    Performed by KATE CHANCE at SURGERY [facility]   • ROTATOR CUFF REPAIR  5/13/2009    Performed by KATE CHANCE at SURGERY [facility]   • HYSTERECTOMY, VAGINAL  1999   • PB ENLARGE BREAST WITH IMPLANT  1999   • OTHER ORTHOPEDIC SURGERY  1993    joint repair left thumb   • US-NEEDLE CORE BX-BREAST PANEL       Social History   Substance Use Topics   • Smoking status: Never Smoker   • Smokeless tobacco: Never Used   • Alcohol use Yes      Comment: 2 per week     Family and Occupational History     Social History   • Marital status:      Spouse name: N/A   • Number of children: N/A   • Years of education: N/A       Objective:   Active Range of Motion:   Upper extremity (left):     All left upper extremity active range of motion: All within functional limits  Upper extremity (right):     All right upper extremity active range of motion: All within functional limits  Lower extremity (left):     Hip flexion: Within functional limits    Hip extension: Within functional limits    Hip abduction: Within functional limits    Hip adduction: Within functional limits    Knee flexion: Within functional limits    Knee extension: Within functional limits    Ankle dorsiflexion: Below functional limits    Ankle plantar flexion: Within functional limits  Lower extremity (right):     All right  lower extremity active range of motion: All within functional limits  Active range of motion comments: Limited active extension ROM of R toes 2-5.  No active extension or flexion of R 1st toe due to fusion.  Neck AROM: flexion= WNL with pain, extension= WNL with pain, B SB= 25% with pain, L rotation= 75%, R rotation= 50% with pain.  R ankle AROM: DF= 0, add= 25.      Strength:     Left upper extremity strength within functional limits.      Right upper extremity strength within functional limits.      Left lower extremity strength within functional limits.      Right lower extremity strength within functional limits.    Strength Comments:  Limited strength of R extensor digitorum longus.    Postural Control (Balance)     Sitting (static): Normal    Sitting (dynamic): Normal    Standing (static): Good    Standing (dynamic): Good    Ambulation: Level Surfaces   Ambulation without assistive device: independent    Observational Gait   Walking speed and stride length within functional limits.   Base of support: normal    Quality of Movement During Gait     Additional Quality of Movement During Gait Details  R toes lack full extension, so impaired R toe off.    Balance/Gait Comments   Gait without shoes, observed R knee valgus and pronation.  Pronation improved with shoe and arch support.  SLS R without shoes- 20 sec with significant sway and LLE and BUE AROM for balance.        Therapeutic Exercises (CPT 45457):     1. Resisted ankle inversion with med resistance band    2. Arch lifts    3. SLS     4. Gastroc and soleus stretches    5. Plantar fascia stretch    6. Toe yoga extension      Therapeutic Exercise Summary: Pt performed these exercises with instruction and SPV.  Provided handout with these exercises for daily HEP.  Check hip flexibility and body weight strength      Time-based treatments/modalities:          Assessment, Response and Plan:   Impairments: impaired physical strength, lacks appropriate home  exercise program, limited mobility, pain with function and weight-bearing intolerance    Assessment details:  58 y.o. female presents with R foot pain and weakness following toe surgeries, with diagnosis of Parkinsonism.  Pt demonstrates mild gait deviations, limited AROM and strength of R toes.  Pt will benefit from skilled PT services to improve R foot and toe AROM and mobility, increase gait tolerance, and improve function.  Prognosis: fair    Goals:   Short Term Goals:   1. Pt able to josé manuel 10 min of ambulation with < 3/10 pain.  2. R ankle DF AROM= 10.  3. Pt will be independent with daily HEP.  Short term goal time span:  2-4 weeks      Long Term Goals:    1. Pt able to josé manuel 20 min of ambulation with < 3/10 pain.  2. Pt able to ascend/descend 1 flight of stairs with < 3/10 pain.  3. Pt able to participate in [employer] boxing class with < 3/10 pain.  Long term goal time span:  6-8 weeks    Plan:   Therapy options:  Physical therapy treatment to continue  Planned therapy interventions:  E Stim Unattended (CPT 86457), Gait Training (CPT 44320), Manual Therapy (CPT 34826), Neuromuscular Re-education (CPT 82873) and Therapeutic Exercise (CPT 89450)  Frequency:  2x week  Duration in weeks:  8  Discussed with:  Patient  Plan details:  1-2x/week as pt tolerates and per progress        Referring provider co-signature:  I have reviewed this plan of care and my co-signature certifies the need for services.  Certification Dates:   From 6/27/19     To 8/21/19    Physician Signature: ________________________________ Date: ______________

## 2019-06-28 ASSESSMENT — ENCOUNTER SYMPTOMS: ALLEVIATING FACTORS: REST

## 2019-07-01 ENCOUNTER — OFFICE VISIT (OUTPATIENT)
Dept: MEDICAL GROUP | Facility: PHYSICIAN GROUP | Age: 59
End: 2019-07-01
Payer: COMMERCIAL

## 2019-07-01 VITALS
DIASTOLIC BLOOD PRESSURE: 60 MMHG | WEIGHT: 108.8 LBS | RESPIRATION RATE: 16 BRPM | HEART RATE: 94 BPM | TEMPERATURE: 98.2 F | HEIGHT: 60 IN | BODY MASS INDEX: 21.36 KG/M2 | OXYGEN SATURATION: 96 % | SYSTOLIC BLOOD PRESSURE: 104 MMHG

## 2019-07-01 DIAGNOSIS — Z11.59 NEED FOR HEPATITIS C SCREENING TEST: ICD-10-CM

## 2019-07-01 DIAGNOSIS — E03.9 HYPOTHYROIDISM, UNSPECIFIED TYPE: Primary | ICD-10-CM

## 2019-07-01 DIAGNOSIS — D58.2 ELEVATED HEMOGLOBIN (HCC): ICD-10-CM

## 2019-07-01 DIAGNOSIS — G20.C PARKINSONISM, UNSPECIFIED PARKINSONISM TYPE: ICD-10-CM

## 2019-07-01 DIAGNOSIS — Z13.6 SCREENING FOR CARDIOVASCULAR CONDITION: ICD-10-CM

## 2019-07-01 PROCEDURE — 99214 OFFICE O/P EST MOD 30 MIN: CPT | Performed by: FAMILY MEDICINE

## 2019-07-01 NOTE — ASSESSMENT & PLAN NOTE
This is a chronic condition, stable. Her last TSH was in 3/2018 and normal. She is doing well without side effects with levothyroxine. No cold intolerance, no fatigue, no constipation.

## 2019-07-01 NOTE — PROGRESS NOTES
Subjective:     CC:  Diagnoses of Hypothyroidism, unspecified type, Parkinsonism, unspecified Parkinsonism type (HCC), Elevated hemoglobin (HCC), Screening for cardiovascular condition, and Need for hepatitis C screening test were pertinent to this visit.    HISTORY OF THE PRESENT ILLNESS: Patient is a 58 y.o. female. This pleasant patient is here today to establish care. Her prior PCP was Sal Ponce MD.    Hypothyroidism  This is a chronic condition, stable. Her last TSH was in 3/2018 and normal. She is doing well without side effects with levothyroxine. No cold intolerance, no fatigue, no constipation.    Parkinsonism (HCC)  This is a chronic condition. She was diagnosed via dopamine brain scan with the [facility]. She is currently on artane and tolerating it well. She does get a little bit of [place]. She follows with neurology regularly.      Allergies: Pcn [penicillins] and Percocet [oxycodone-acetaminophen]    Current Outpatient Prescriptions Ordered in [facility]   Medication Sig Dispense Refill   • trihexyphenidyl (ARTANE) 2 MG Tab Take 1 Tab by mouth 3 times a day. (Patient taking differently: Take 2 mg by mouth 2 times a day.) 90 Tab 3   • levothyroxine (SYNTHROID) 88 MCG Tab Take 1 Tab by mouth Every morning on an empty stomach. 90 Tab 3     No current Epic-ordered facility-administered medications on file.        Past Medical History:   Diagnosis Date   • Anesthesia     severe ponv, hard to wake up   • Arthritis     shoulders   • Diverticulosis 2012    pt stated it was noted on colonoscopy   • Hypothyroid     hypothyroid   • Infectious disease     around bronchitis/flu 12/2011   • Lyme disease     chronic   • Pain 04/2018    right foot, bilateral shoulder   • Parkinsonism (HCC) 1/24/2018       Past Surgical History:   Procedure Laterality Date   • TOE FUSION Right 5/7/2018    Procedure: TOE FUSION - 1ST MTP;  Surgeon: Janes Benavidez M.D.;  Location: SURGERY SAME DAY [facility];  Service: Orthopedics    • FLEXOR TENDON REPAIR Right 5/7/2018    Procedure: FLEXOR TENDON REPAIR - 4TH RELEASE W/SOFT TISSUE RELEASE 2/3;  Surgeon: Janes Benavidez M.D.;  Location: SURGERY SAME DAY [facility];  Service: Orthopedics   • HARDWARE REMOVAL ORTHO Right 5/7/2018    Procedure: HARDWARE REMOVAL ORTHO - INTERNAL FIXATION;  Surgeon: Janes Benavidez M.D.;  Location: SURGERY SAME DAY [facility];  Service: Orthopedics   • ORTHOPEDIC OSTEOTOMY Right 5/7/2018    Procedure: ORTHOPEDIC OSTEOTOMY - DISTAL METATARSAL 2/3/4;  Surgeon: Janes Benavidez M.D.;  Location: SURGERY SAME DAY [facility];  Service: Orthopedics   • BUNIONECTOMY Right 5/1/2017    Procedure: BUNIONECTOMY - PROXIMAL HALLUX VALGUS CORRECTION W/BONE GRAFT;  Surgeon: Janes Benavidez M.D.;  Location: SURGERY [facility];  Service:    • NEUROMA EXCISION  5/1/2017    Procedure: NEUROMA EXCISION - 2ND WEBSPACE;  Surgeon: Janes Benavidez M.D.;  Location: SURGERY [facility];  Service:    • BREAST IMPLANT REVISION Bilateral 4/11/2016    Procedure: BREAST IMPLANT EXCHANGE OF SALINE TO SILICONE W/REPOSITIONING OF LATERAL FOLDS;  Surgeon: Mieky Adkins M.D.;  Location: SURGERY [facility];  Service:    • BREAST BIOPSY  1/24/2012    Performed by SANDRA ESTRADA at SURGERY SAME DAY [facility]   • SHOULDER ARTHROSCOPY  12/1/2010    Performed by KATE CHANCE at SURGERY [facility]   • SHOULDER DECOMPRESSION  12/1/2010    Performed by KATE CHANCE at SURGERY [facility]   • ROTATOR CUFF REPAIR  12/1/2010    Performed by KATE CHANCE at SURGERY [facility]   • SHOULDER DECOMPRESSION ARTHROSCOPIC  5/13/2009    Performed by KATE CHANCE at SURGERY [facility]   • ROTATOR CUFF REPAIR  5/13/2009    Performed by KATE CHANCE at [facility]   • HYSTERECTOMY, VAGINAL  1999    fibroids   • PB ENLARGE BREAST WITH IMPLANT Bilateral 1999   • OTHER ORTHOPEDIC SURGERY  1993    joint repair left thumb   • US-NEEDLE CORE BX-BREAST  PANEL         Social History   Substance Use Topics   • Smoking status: Never Smoker   • Smokeless tobacco: Never Used   • Alcohol use Yes      Comment: 1 drink per week        Social History     Social History Narrative   • No narrative on file       Family History   Problem Relation Age of Onset   • Cancer Paternal Aunt         breast   • Diabetes Mother    • Thyroid Mother    • Diabetes Father    • Thyroid Daughter    • Thyroid Sister    • Cancer Paternal Aunt         breast, uterine   • Cancer Paternal Aunt         bladder   • Cancer Paternal Aunt         colon   • Heart Disease Neg Hx    • Stroke Neg Hx    • Alcohol/Drug Neg Hx        Health Maintenance: Completed    ROS:   Gen: no fevers/chills, no changes in weight  Eyes: no changes in vision  ENT: no bloody nose  Pulm: no sob  CV: no chest pain  GI: no nausea/vomiting  : no dysuria  MSk: no myalgias  Skin: no rash  Neuro: no headaches      Objective:     Exam: /60 (BP Location: Left arm, Patient Position: Sitting, BP Cuff Size: Adult)   Pulse 94   Temp 36.8 °C (98.2 °F) (Temporal)   Resp 16   Ht 1.524 m (5')   Wt 49.4 kg (108 lb 12.8 oz)   SpO2 96%  Body mass index is 21.25 kg/m².    General: Normal appearing. No distress.  HEENT: Normocephalic. Eyes conjunctiva clear lids without ptosis, pupils equal and reactive to light accommodation, ears normal shape and contour, canals are clear bilaterally, tympanic membranes are benign, oropharynx is without erythema, edema or exudates.   Neck: Supple without JVD. Thyroid is not enlarged.  Pulmonary: Clear to ausculation.  Normal effort. No rales, ronchi, or wheezing.  Cardiovascular: Regular rate and rhythm without murmur. Carotid and radial pulses are intact and equal bilaterally.  Abdomen: Soft, nontender, nondistended. Normal bowel sounds. Liver and spleen are not palpable  Neurologic: Grossly nonfocal  Lymph: No cervical or supraclavicular lymph nodes are palpable  Skin: Warm and dry.  No obvious  lesions.  Musculoskeletal: Normal gait. No extremity cyanosis, clubbing, or edema.  Psych: Normal mood and affect. Alert and oriented x3. Judgment and insight is normal.    Assessment & Plan:   58 y.o. female with the following -    1. Hypothyroidism, unspecified type  This is a chronic condition, stable.  She reports she has been on levothyroxine 88 mcg for several years.  She is tolerating it well without any side effect.  Her last TSH was in March, 2018 and normal.  - TSH; Future    2. Parkinsonism, unspecified Parkinsonism type (HCC)  This is a chronic condition, stable.  She was diagnosed with parkinsonism via a dopamine brain scan with the [facility].  She currently is on Artane and tolerating it well except for a little bit of [place].  She does follow with neurology regularly.  -Continue Artane 2 mg twice daily  -Continue to follow with neurology    3. Elevated hemoglobin (HCC)  She was noted to have an elevated hemoglobin and hematocrit on labs in March, 2018.  We will recheck this.  - CBC WITH DIFFERENTIAL; Future    4. Screening for cardiovascular condition  - Lipid Profile; Future    5. Need for hepatitis C screening test  - HEP C VIRUS ANTIBODY; Future    Return in about 6 months (around 1/1/2020) for Annual/wellness visit.    Please note that this dictation was created using voice recognition software. I have made every reasonable attempt to correct obvious errors, but I expect that there are errors of grammar and possibly content that I did not discover before finalizing the note.

## 2019-07-01 NOTE — ASSESSMENT & PLAN NOTE
This is a chronic condition. She was diagnosed via dopamine brain scan with the [facility]. She is currently on artane and tolerating it well. She does get a little bit of [place]. She follows with neurology regularly.

## 2019-07-03 ENCOUNTER — PHYSICAL THERAPY (OUTPATIENT)
Dept: PHYSICAL THERAPY | Facility: REHABILITATION | Age: 59
End: 2019-07-03
Attending: PSYCHIATRY & NEUROLOGY
Payer: COMMERCIAL

## 2019-07-03 DIAGNOSIS — G20.C PARKINSONISM, UNSPECIFIED PARKINSONISM TYPE: ICD-10-CM

## 2019-07-03 DIAGNOSIS — M79.671 RIGHT FOOT PAIN: ICD-10-CM

## 2019-07-03 PROCEDURE — 97140 MANUAL THERAPY 1/> REGIONS: CPT

## 2019-07-03 PROCEDURE — 97110 THERAPEUTIC EXERCISES: CPT

## 2019-07-03 NOTE — OP THERAPY DAILY TREATMENT
"  Outpatient Physical Therapy  DAILY TREATMENT     [facility] Physical [address].  [address]  [place] [address]  Phone:  [phone]  Fax:  [phone]    Date: 07/03/2019    Patient: Mariela Huff  YOB: 1960  MRN: [id number]     Time Calculation  Start time: 1100  Stop time: 1133 Time Calculation (min): 33 minutes     Chief Complaint: Foot Problem    Visit #: 2    SUBJECTIVE:  My foot feels ok today.  I've been doing the exercises at home.  My right toes still lack full AROM.    OBJECTIVE:  Current objective measures: B hip PROM and flexibility of hamstrings, James test, all WNL.        Therapeutic Exercises (CPT 97257):     1. Diagonals with medium resistance band (DF/add<-> pf/abd and PF/abd <-> DF/add), x15R    2. Toe curls, x10R    3. Arch lifts in standing, x10B    4. Arch lifts in sitting, x10B    5. Squats on Airex, x15    6. Heel raises on Airex, x15, observed less calcaneal inv on R vs L    7. Toe raises on Airex, x10    8. Lunge with 4\" step, x10B    Therapeutic Treatments and Modalities:     1. Manual Therapy (CPT 12797), R foot ray APs (1-5) Gr II-III.  R 2nd and 3rd MTP flexion mobs Gr II-III.  R talocrural posterior mobs Gr III and distraction Gr III for DF.     Time-based treatments/modalities:  Manual therapy minutes (CPT 48151): 15 minutes  Therapeutic exercise minutes (CPT 63387): 15 minutes       ASSESSMENT:   Response to treatment: Pt limited by weakness of R foot/ankle add/invertors, limited talocrural DF mobility, and toe flexor/extensor strength.  Questionable neural involvement due to Parkinsonism diagnosis.    PLAN/RECOMMENDATIONS:   Plan for treatment: therapy treatment to continue next visit.  Planned interventions for next visit: continue with current treatment. Consider NMES for R foot/ankle strength.      "

## 2019-07-11 ENCOUNTER — PHYSICAL THERAPY (OUTPATIENT)
Dept: PHYSICAL THERAPY | Facility: REHABILITATION | Age: 59
End: 2019-07-11
Attending: PSYCHIATRY & NEUROLOGY
Payer: COMMERCIAL

## 2019-07-11 DIAGNOSIS — G20.C PARKINSONISM, UNSPECIFIED PARKINSONISM TYPE: ICD-10-CM

## 2019-07-11 DIAGNOSIS — M79.671 RIGHT FOOT PAIN: ICD-10-CM

## 2019-07-11 PROCEDURE — 97110 THERAPEUTIC EXERCISES: CPT

## 2019-07-11 PROCEDURE — 97140 MANUAL THERAPY 1/> REGIONS: CPT

## 2019-07-11 PROCEDURE — 97014 ELECTRIC STIMULATION THERAPY: CPT

## 2019-07-11 NOTE — OP THERAPY DAILY TREATMENT
Outpatient Physical Therapy  DAILY TREATMENT     [facility] Physical [address].  [address]  [place] [address]  Phone:  [phone]  Fax:  [phone]    Date: 07/11/2019    Patient: Mariela Huff  YOB: 1960  MRN: [id number]     Time Calculation  Start time: 1533  Stop time: 1615 Time Calculation (min): 42 minutes     Chief Complaint: Foot Problem    Visit #: 3    SUBJECTIVE:  Continued gait deviations due to limited strength and ROM of R toes.  Doing HEP.    OBJECTIVE:        Therapeutic Exercises (CPT 75016):     1. Ankle add and abd with light resistance band, x8 R, muscle fatigue results in decreased ROM by rep 5.    2. SLS on Airex, x30 sec B with UEs prn    3. Arch lifts in standing on Airex, x10B    4. SLS on floor, x30 sec R    5. BLE standing on dynadisc circles, x10 CW/CCW    6. Heel raises on Airex, x10    7. Toe raises on Airex, x10    8. Lunge with dynadisc, x10B    9. Tiltboard PF/DF and INV/EV- seated, x20 R    10. BAPS, L1, x15 CW/CCW    Therapeutic Treatments and Modalities:     1. Manual Therapy (CPT 91715), R foot ray APs (1-5) Gr II-III.  R 2nd and 3rd MTP flexion mobs Gr II-III.  R talocrural posterior mobs Gr III and distraction Gr III for DF.   R 2nd and 3rd toes PROM flexion    2. E Stim Unattended (CPT 80307), Russian stim to R tib ant and extensor digitorum, 5sec/5 sec, x10 min, active DF and toe extension til fatigue    Therapeutic Treatment and Modalities Summary:       Time-based treatments/modalities:  Manual therapy minutes (CPT 67101): 10 minutes  Therapeutic exercise minutes (CPT 87225): 20 minutes     ASSESSMENT:   Response to treatment: Limited R ankle and toe mobility causing gait deviations.    PLAN/RECOMMENDATIONS:   Plan for treatment: therapy treatment to continue next visit.  Planned interventions for next visit: continue with current treatment.  Re-assess MMTs and AROM, goals, HEP.

## 2019-07-12 ENCOUNTER — HOSPITAL ENCOUNTER (EMERGENCY)
Facility: MEDICAL CENTER | Age: 59
End: 2019-07-12
Attending: EMERGENCY MEDICINE
Payer: COMMERCIAL

## 2019-07-12 ENCOUNTER — APPOINTMENT (OUTPATIENT)
Dept: RADIOLOGY | Facility: MEDICAL CENTER | Age: 59
End: 2019-07-12
Attending: EMERGENCY MEDICINE
Payer: COMMERCIAL

## 2019-07-12 VITALS
WEIGHT: 105.82 LBS | RESPIRATION RATE: 20 BRPM | DIASTOLIC BLOOD PRESSURE: 80 MMHG | HEIGHT: 60 IN | BODY MASS INDEX: 20.78 KG/M2 | SYSTOLIC BLOOD PRESSURE: 128 MMHG | TEMPERATURE: 99 F | OXYGEN SATURATION: 93 % | HEART RATE: 80 BPM

## 2019-07-12 DIAGNOSIS — R07.9 CHEST PAIN, UNSPECIFIED TYPE: ICD-10-CM

## 2019-07-12 LAB
ALBUMIN SERPL BCP-MCNC: 4.2 G/DL (ref 3.2–4.9)
ALBUMIN/GLOB SERPL: 1.5 G/DL
ALP SERPL-CCNC: 82 U/L (ref 30–99)
ALT SERPL-CCNC: 27 U/L (ref 2–50)
ANION GAP SERPL CALC-SCNC: 7 MMOL/L (ref 0–11.9)
AST SERPL-CCNC: 27 U/L (ref 12–45)
BASOPHILS # BLD AUTO: 0.7 % (ref 0–1.8)
BASOPHILS # BLD: 0.04 K/UL (ref 0–0.12)
BILIRUB SERPL-MCNC: 0.7 MG/DL (ref 0.1–1.5)
BUN SERPL-MCNC: 11 MG/DL (ref 8–22)
CALCIUM SERPL-MCNC: 9.2 MG/DL (ref 8.4–10.2)
CHLORIDE SERPL-SCNC: 107 MMOL/L (ref 96–112)
CO2 SERPL-SCNC: 26 MMOL/L (ref 20–33)
CREAT SERPL-MCNC: 0.71 MG/DL (ref 0.5–1.4)
EKG IMPRESSION: NORMAL
EOSINOPHIL # BLD AUTO: 0.08 K/UL (ref 0–0.51)
EOSINOPHIL NFR BLD: 1.4 % (ref 0–6.9)
ERYTHROCYTE [DISTWIDTH] IN BLOOD BY AUTOMATED COUNT: 36.9 FL (ref 35.9–50)
GLOBULIN SER CALC-MCNC: 2.8 G/DL (ref 1.9–3.5)
GLUCOSE SERPL-MCNC: 100 MG/DL (ref 65–99)
HCT VFR BLD AUTO: 44.9 % (ref 37–47)
HGB BLD-MCNC: 15.7 G/DL (ref 12–16)
IMM GRANULOCYTES # BLD AUTO: 0.02 K/UL (ref 0–0.11)
IMM GRANULOCYTES NFR BLD AUTO: 0.4 % (ref 0–0.9)
LYMPHOCYTES # BLD AUTO: 0.96 K/UL (ref 1–4.8)
LYMPHOCYTES NFR BLD: 17 % (ref 22–41)
MCH RBC QN AUTO: 31.5 PG (ref 27–33)
MCHC RBC AUTO-ENTMCNC: 35 G/DL (ref 33.6–35)
MCV RBC AUTO: 90 FL (ref 81.4–97.8)
MONOCYTES # BLD AUTO: 0.33 K/UL (ref 0–0.85)
MONOCYTES NFR BLD AUTO: 5.8 % (ref 0–13.4)
NEUTROPHILS # BLD AUTO: 4.22 K/UL (ref 2–7.15)
NEUTROPHILS NFR BLD: 74.7 % (ref 44–72)
NRBC # BLD AUTO: 0 K/UL
NRBC BLD-RTO: 0 /100 WBC
PLATELET # BLD AUTO: 210 K/UL (ref 164–446)
PMV BLD AUTO: 9.7 FL (ref 9–12.9)
POTASSIUM SERPL-SCNC: 4.1 MMOL/L (ref 3.6–5.5)
PROT SERPL-MCNC: 7 G/DL (ref 6–8.2)
RBC # BLD AUTO: 4.99 M/UL (ref 4.2–5.4)
SODIUM SERPL-SCNC: 140 MMOL/L (ref 135–145)
TROPONIN T SERPL-MCNC: <6 NG/L (ref 6–19)
TROPONIN T SERPL-MCNC: <6 NG/L (ref 6–19)
WBC # BLD AUTO: 5.7 K/UL (ref 4.8–10.8)

## 2019-07-12 PROCEDURE — 85025 COMPLETE CBC W/AUTO DIFF WBC: CPT

## 2019-07-12 PROCEDURE — 99284 EMERGENCY DEPT VISIT MOD MDM: CPT

## 2019-07-12 PROCEDURE — 71045 X-RAY EXAM CHEST 1 VIEW: CPT

## 2019-07-12 PROCEDURE — 80053 COMPREHEN METABOLIC PANEL: CPT

## 2019-07-12 PROCEDURE — 93005 ELECTROCARDIOGRAM TRACING: CPT | Performed by: EMERGENCY MEDICINE

## 2019-07-12 PROCEDURE — 93005 ELECTROCARDIOGRAM TRACING: CPT

## 2019-07-12 PROCEDURE — 84484 ASSAY OF TROPONIN QUANT: CPT

## 2019-07-12 RX ORDER — TRIHEXYPHENIDYL HYDROCHLORIDE 2 MG/1
2 TABLET ORAL 2 TIMES DAILY
Status: SHIPPED | COMMUNITY
End: 2019-12-17 | Stop reason: SDUPTHER

## 2019-07-12 ASSESSMENT — PAIN DESCRIPTION - DESCRIPTORS: DESCRIPTORS: ACHING

## 2019-07-12 NOTE — ED NOTES
Pt is referred to our facility by her PCP, for further evaluation of chest pressure, and an abnormal EKG earlier today.   Chief Complaint   Patient presents with   • Chest Pressure     /80   Pulse 88   Temp 37.2 °C (99 °F) (Temporal)   Resp 18   Ht 1.524 m (5')   Wt 48 kg (105 lb 13.1 oz)   SpO2 96%   BMI 20.67 kg/m²

## 2019-07-12 NOTE — ED NOTES
Med Rec completed per patient   Allergies reviewed  No ORAL antibiotics in last 14 days, but patient has been on IV antibiotics

## 2019-07-12 NOTE — ED PROVIDER NOTES
ED Provider Note    ED Provider Note      Primary care provider: Abi Bragg M.D.    CHIEF COMPLAINT  Chief Complaint   Patient presents with   • Chest Pressure       HPI  Mariela Huff is a 58 y.o. female who presents to the Emergency Department with chief complaint of Chest pressure.  Patient was at her primary care physician today for infusion treatment of Keflex.  Patient is receiving these treatments for presumed chronic Lyme disease infection.  She began this therapy on the eighth of this month she is now on her fourth day of treatment.  While at the primary care physician she began to have some pressure she states that she has had this a couple times throughout the week.  Very minimal pressure sensation throughout her chest it does not spread anywhere else in her body she is had no difficulties breathing no nausea no diaphoresis there is no exertional component to the pain.  Patient stated initially patient thought that she was just having some acid reflux.  At the primary care physicians she had EKG that was read as atrial flutter she was sent here for further evaluation treatment.  Patient also does have a fine tremor from what she describes as early Parkinson's disease.  She is never had any heart problems she is never had high blood pressure she is actually usually borderline hypotensive she is a non-smoker no diabetes no hyperlipidemia no other symptoms or concerns at this time and she is currently asymptomatic.    REVIEW OF SYSTEMS  10 systems reviewed and otherwise negative, pertinent positives and negatives listed in the history of present illness.    PAST MEDICAL HISTORY   has a past medical history of Anesthesia; Arthritis; Diverticulosis (2012); Hypothyroid; Infectious disease; Lyme disease; Pain (04/2018); and Parkinsonism ([facility]) (1/24/2018).    SURGICAL HISTORY   has a past surgical history that includes hysterectomy, vaginal (1999); shoulder decompression arthroscopic  (5/13/2009); rotator cuff repair (5/13/2009); other orthopedic surgery (1993); shoulder arthroscopy (12/1/2010); shoulder decompression (12/1/2010); rotator cuff repair (12/1/2010); breast biopsy (1/24/2012); enlarge breast with implant (Bilateral, 1999); us-needle core bx-breast panel; breast implant revision (Bilateral, 4/11/2016); bunionectomy (Right, 5/1/2017); neuroma excision (5/1/2017); toe fusion (Right, 5/7/2018); flexor tendon repair (Right, 5/7/2018); hardware removal ortho (Right, 5/7/2018); and orthopedic osteotomy (Right, 5/7/2018).    SOCIAL HISTORY  Social History   Substance Use Topics   • Smoking status: Never Smoker   • Smokeless tobacco: Never Used   • Alcohol use Yes      Comment: Occasionally      History   Drug Use No       FAMILY HISTORY  Non-Contributory    CURRENT MEDICATIONS  Home Medications     Reviewed by Patsy Pryor PhT (Pharmacy Tech) on 07/12/19 at 1202  Med List Status: Complete   Medication Last Dose Status   levothyroxine (SYNTHROID) 88 MCG Tab 7/11/2019 Active   Multiple Vitamin (MULTI VITAMIN PO) 7/11/2019 Active   Non Formulary Request 7/12/2019 Active   trihexyphenidyl (ARTANE) 2 MG Tab 7/12/2019 Active                ALLERGIES  Allergies   Allergen Reactions   • Pcn [Penicillins] Rash     Rash     • Percocet [Oxycodone-Acetaminophen] Vomiting       PHYSICAL EXAM  VITAL SIGNS: /80   Pulse 88   Temp 37.2 °C (99 °F) (Temporal)   Resp 18   Ht 1.524 m (5')   Wt 48 kg (105 lb 13.1 oz)   SpO2 96%   BMI 20.67 kg/m²   Pulse ox interpretation: I interpret this pulse ox as normal.  Constitutional: Alert and oriented x 3, no acute distress  HEENT: Atraumatic normocephalic, pupils are equal round reactive to light extraocular movements are intact. The nares is clear, external ears are normal, mouth shows moist mucous membranes  Neck: Supple, no JVD no tracheal deviation  Cardiovascular: Regular rate and rhythm no murmur rub or gallop 2+ pulses peripherally x4  Thorax &  Lungs: No respiratory distress, no wheezes rales or rhonchi, No chest tenderness.   GI: Soft nontender nondistended positive bowel sounds, no peritoneal signs  Skin: Warm dry no acute rash or lesion  Musculoskeletal: Moving all extremities with full range and 5 of 5 strength, no acute  deformity  Neurologic: Cranial nerves III through XII are grossly intact, no sensory deficit, no cerebellar dysfunction, fine tremor  Psychiatric: Appropriate affect for situation at this time      DIAGNOSTIC STUDIES / PROCEDURES  LABS      Results for orders placed or performed during the hospital encounter of 07/12/19   CBC with Differential   Result Value Ref Range    WBC 5.7 4.8 - 10.8 K/uL    RBC 4.99 4.20 - 5.40 M/uL    Hemoglobin 15.7 12.0 - 16.0 g/dL    Hematocrit 44.9 37.0 - 47.0 %    MCV 90.0 81.4 - 97.8 fL    MCH 31.5 27.0 - 33.0 pg    MCHC 35.0 33.6 - 35.0 g/dL    RDW 36.9 35.9 - 50.0 fL    Platelet Count 210 164 - 446 K/uL    MPV 9.7 9.0 - 12.9 fL    Neutrophils-Polys 74.70 (H) 44.00 - 72.00 %    Lymphocytes 17.00 (L) 22.00 - 41.00 %    Monocytes 5.80 0.00 - 13.40 %    Eosinophils 1.40 0.00 - 6.90 %    Basophils 0.70 0.00 - 1.80 %    Immature Granulocytes 0.40 0.00 - 0.90 %    Nucleated RBC 0.00 /100 WBC    Neutrophils (Absolute) 4.22 2.00 - 7.15 K/uL    Lymphs (Absolute) 0.96 (L) 1.00 - 4.80 K/uL    Monos (Absolute) 0.33 0.00 - 0.85 K/uL    Eos (Absolute) 0.08 0.00 - 0.51 K/uL    Baso (Absolute) 0.04 0.00 - 0.12 K/uL    Immature Granulocytes (abs) 0.02 0.00 - 0.11 K/uL    NRBC (Absolute) 0.00 K/uL   Complete Metabolic Panel (CMP)   Result Value Ref Range    Sodium 140 135 - 145 mmol/L    Potassium 4.1 3.6 - 5.5 mmol/L    Chloride 107 96 - 112 mmol/L    Co2 26 20 - 33 mmol/L    Anion Gap 7.0 0.0 - 11.9    Glucose 100 (H) 65 - 99 mg/dL    Bun 11 8 - 22 mg/dL    Creatinine 0.71 0.50 - 1.40 mg/dL    Calcium 9.2 8.4 - 10.2 mg/dL    AST(SGOT) 27 12 - 45 U/L    ALT(SGPT) 27 2 - 50 U/L    Alkaline Phosphatase 82 30 - 99 U/L     Total Bilirubin 0.7 0.1 - 1.5 mg/dL    Albumin 4.2 3.2 - 4.9 g/dL    Total Protein 7.0 6.0 - 8.2 g/dL    Globulin 2.8 1.9 - 3.5 g/dL    A-G Ratio 1.5 g/dL   Troponin   Result Value Ref Range    Troponin T <6 6 - 19 ng/L   ESTIMATED GFR   Result Value Ref Range    GFR If African American >60 >60 mL/min/1.73 m 2    GFR If Non African American >60 >60 mL/min/1.73 m 2   TROPONIN   Result Value Ref Range    Troponin T <6 6 - 19 ng/L   EKG   Result Value Ref Range    Report       [facility] Emergency Dept.    Test Date:  2019  Pt Name:    ROBBY COATES             Department: [facility]  MRN:        [id number]                      Room:  Gender:     Female                       Technician: KADEN  :        1960                   Requested By:ER TRIAGE PROTOCOL  Order #:    [id number]                    Reading MD: KATLIN HATHAWAY MD    Measurements  Intervals                                Axis  Rate:       82                           P:  UT:                                      QRS:        -90  QRSD:       92                           T:          258  QT:         360  QTc:        421    Interpretive Statements  Limited evaluation due to artifact.  Insomnia and tremor there is no ST  elevation  there is no ST depression no other obvious ischemic or rhythmic features.  Electronically Signed On 2019 12:13:55 PDT by KATLIN HATHAWAY MD     All labs reviewed by me.      RADIOLOGY  DX-CHEST-PORTABLE (1 VIEW)   Final Result      No radiographic evidence of acute cardiopulmonary process.        The radiologist's interpretation of all radiological studies have been reviewed by me.    COURSE & MEDICAL DECISION MAKING  Pertinent Labs & Imaging studies reviewed. (See chart for details)    11:52 AM - Patient seen and examined at bedside.  Patient presents with a EKG from clinic.  This is very suspicious for artifact due to tremor.  EKG done here in triage was initially read as atrial  fibrillation however upon review there are clearly P waves for every QRS motion artifact from tremor I do not see any other ischemic features.  Patient is 58 years old she has had chest pressure in her mid I do feel though cardiac work-up is warranted at this time discussed with patient that we would at least keep her here for 2 sets of troponins she is agreeable to this time she is currently asymptomatic.        Repeat troponin unremarkable patient is remained asymptomatic throughout time in the ER she is a heart score of 1 at this point due to age.  She is given instructions to return immediately for any further chest pain shortness breath any other acute concerns otherwise follow-up with cardiology and primary care at the next available time discharged in stable and improved condition.        Patient noted to have slightly elevated blood pressure likely circumstantial secondary to presenting complaint. Referred to primary care physician for further evaluation.            /80   Pulse 88   Temp 37.2 °C (99 °F) (Temporal)   Resp 18   Ht 1.524 m (5')   Wt 48 kg (105 lb 13.1 oz)   SpO2 96%   BMI 20.67 kg/m²     Abi Bragg M.D.  [address]  [facility] [id number] [phone]    Schedule an appointment as soon as possible for a visit       [facility]  [address] [facility], [address]  [facility] [address] [phone]          Discharge Medication List as of 7/12/2019  3:43 PM          FINAL IMPRESSION  1. Chest pain, unspecified type Inactive          This dictation has been created using voice recognition software and/or scribes. The accuracy of the dictation is limited by the abilities of the software and the expertise of the scribes. I expect there may be some errors of grammar and possibly content. I made every attempt to manually correct the errors within my dictation. However, errors related to voice recognition software and/or scribes may still exist and should be interpreted  within the appropriate context.

## 2019-07-15 ENCOUNTER — APPOINTMENT (OUTPATIENT)
Dept: PHYSICAL THERAPY | Facility: REHABILITATION | Age: 59
End: 2019-07-15
Attending: PSYCHIATRY & NEUROLOGY
Payer: COMMERCIAL

## 2019-07-17 ENCOUNTER — PHYSICAL THERAPY (OUTPATIENT)
Dept: PHYSICAL THERAPY | Facility: REHABILITATION | Age: 59
End: 2019-07-17
Attending: PSYCHIATRY & NEUROLOGY
Payer: COMMERCIAL

## 2019-07-17 DIAGNOSIS — M79.671 RIGHT FOOT PAIN: ICD-10-CM

## 2019-07-17 DIAGNOSIS — G20.C PARKINSONISM, UNSPECIFIED PARKINSONISM TYPE: ICD-10-CM

## 2019-07-17 PROCEDURE — 97140 MANUAL THERAPY 1/> REGIONS: CPT

## 2019-07-17 PROCEDURE — 97014 ELECTRIC STIMULATION THERAPY: CPT

## 2019-07-17 PROCEDURE — 97110 THERAPEUTIC EXERCISES: CPT

## 2019-07-17 NOTE — OP THERAPY DAILY TREATMENT
"  Outpatient Physical Therapy  DAILY TREATMENT     [facility] Physical [address]  [address]  [place] [address]  Phone:  [phone]  Fax:  [phone]    Date: 07/17/2019    Patient: Mariela Huff  YOB: 1960  MRN: [id number]     Time Calculation    1333  1420  47 min     Chief Complaint: Foot Problem    Visit #: 4    SUBJECTIVE:  Pain with walking in shoes greater than 5 mins. Walking barefoot on the tile floor at home causes pain in plantar MTs and toe cramping immediately.  I can get up/down stairs ok.  R foot adduction/supination is still weak.  After about 5 reps of exercise, I have significantly less ROM and more difficulty, more tremors.  The last reps are never as good as the first.    OBJECTIVE:  Current objective measures: R ankle AROM: DF=6 PF=44 add=wnl abd=11 deg.   R ankle MMTs: DF=5/5 PF=4+/5 add=4+/5 abd= 4+/5        Therapeutic Exercises (CPT 99585):     1. Ankle add and abd with light resistance band, x8 R, muscle fatigue results in decreased ROM by rep 5.    2. SLS on Airex, x30 sec B with UEs prn    3. Squats on Airex, x10B    4. BOSU circles, x30 sec R    5. BLE standing on dynadisc circles, x10 CW/CCW    6. Heel raises on Airex, x10    7. Forward lunge to 4\" step for DF ROM, x10    8. BOSU SLS    9. Tiltboard  INV/EV- seated, x15R    10. BAPS, L1, x15 CW/CCW    Therapeutic Treatments and Modalities:     1. Manual Therapy (CPT 16917), R ankle DF MWMs, R foot ray APs (1-5) Gr II-III.  R 2nd and 3rd MTP flexion mobs Gr II-III.  R talocrural posterior mobs Gr III and distraction Gr III for DF.   R 2nd and 3rd toes PROM flexion    2. E Stim Unattended (CPT 89520), Russian stim to R tib ant and extensor digitorum, 5sec/5 sec, x10 min, active DF and toe extension til fatigue    Therapeutic Treatment and Modalities Summary:       Time-based treatments/modalities:  Manual therapy minutes (CPT 02391): 10 minutes  Therapeutic exercise minutes (CPT " 12968): 20 minutes     ASSESSMENT:   Response to treatment: Increased ankle AROM, continued symptoms with walking/weightbearing.    PLAN/RECOMMENDATIONS:   Plan for treatment: therapy treatment to continue next visit.  Planned interventions for next visit: continue with current treatment.

## 2019-07-22 ENCOUNTER — APPOINTMENT (OUTPATIENT)
Dept: PHYSICAL THERAPY | Facility: REHABILITATION | Age: 59
End: 2019-07-22
Attending: PSYCHIATRY & NEUROLOGY
Payer: COMMERCIAL

## 2019-07-24 ENCOUNTER — APPOINTMENT (RX ONLY)
Dept: URBAN - METROPOLITAN AREA CLINIC 4 | Facility: CLINIC | Age: 59
Setting detail: DERMATOLOGY
End: 2019-07-24

## 2019-07-24 DIAGNOSIS — L82.1 OTHER SEBORRHEIC KERATOSIS: ICD-10-CM

## 2019-07-24 DIAGNOSIS — D18.0 HEMANGIOMA: ICD-10-CM

## 2019-07-24 DIAGNOSIS — L81.4 OTHER MELANIN HYPERPIGMENTATION: ICD-10-CM

## 2019-07-24 DIAGNOSIS — D22 MELANOCYTIC NEVI: ICD-10-CM

## 2019-07-24 PROBLEM — D22.72 MELANOCYTIC NEVI OF LEFT LOWER LIMB, INCLUDING HIP: Status: ACTIVE | Noted: 2019-07-24

## 2019-07-24 PROBLEM — D22.5 MELANOCYTIC NEVI OF TRUNK: Status: ACTIVE | Noted: 2019-07-24

## 2019-07-24 PROBLEM — D23.62 OTHER BENIGN NEOPLASM OF SKIN OF LEFT UPPER LIMB, INCLUDING SHOULDER: Status: ACTIVE | Noted: 2019-07-24

## 2019-07-24 PROBLEM — D22.71 MELANOCYTIC NEVI OF RIGHT LOWER LIMB, INCLUDING HIP: Status: ACTIVE | Noted: 2019-07-24

## 2019-07-24 PROBLEM — D18.01 HEMANGIOMA OF SKIN AND SUBCUTANEOUS TISSUE: Status: ACTIVE | Noted: 2019-07-24

## 2019-07-24 PROBLEM — D22.61 MELANOCYTIC NEVI OF RIGHT UPPER LIMB, INCLUDING SHOULDER: Status: ACTIVE | Noted: 2019-07-24

## 2019-07-24 PROBLEM — D22.62 MELANOCYTIC NEVI OF LEFT UPPER LIMB, INCLUDING SHOULDER: Status: ACTIVE | Noted: 2019-07-24

## 2019-07-24 PROCEDURE — ? COUNSELING

## 2019-07-24 PROCEDURE — ? SUNSCREEN RECOMMENDATIONS

## 2019-07-24 PROCEDURE — 99396 PREV VISIT EST AGE 40-64: CPT

## 2019-07-24 ASSESSMENT — LOCATION SIMPLE DESCRIPTION DERM
LOCATION SIMPLE: RIGHT CHEEK
LOCATION SIMPLE: LEFT THIGH
LOCATION SIMPLE: LEFT HAND
LOCATION SIMPLE: LEFT ANTERIOR NECK
LOCATION SIMPLE: LEFT POSTERIOR UPPER ARM
LOCATION SIMPLE: RIGHT POSTERIOR UPPER ARM
LOCATION SIMPLE: RIGHT THIGH
LOCATION SIMPLE: ABDOMEN
LOCATION SIMPLE: LEFT UPPER BACK
LOCATION SIMPLE: RIGHT HAND
LOCATION SIMPLE: RIGHT FOREARM
LOCATION SIMPLE: UPPER BACK
LOCATION SIMPLE: LEFT CHEEK
LOCATION SIMPLE: LEFT FOREARM
LOCATION SIMPLE: RIGHT LOWER BACK

## 2019-07-24 ASSESSMENT — LOCATION DETAILED DESCRIPTION DERM
LOCATION DETAILED: LEFT CENTRAL MALAR CHEEK
LOCATION DETAILED: LEFT INFERIOR ANTERIOR NECK
LOCATION DETAILED: RIGHT DISTAL POSTERIOR UPPER ARM
LOCATION DETAILED: LEFT ANTERIOR PROXIMAL THIGH
LOCATION DETAILED: RIGHT CENTRAL MALAR CHEEK
LOCATION DETAILED: RIGHT ANTERIOR PROXIMAL THIGH
LOCATION DETAILED: LEFT SUPERIOR MEDIAL UPPER BACK
LOCATION DETAILED: SUPERIOR THORACIC SPINE
LOCATION DETAILED: LEFT PROXIMAL DORSAL FOREARM
LOCATION DETAILED: PERIUMBILICAL SKIN
LOCATION DETAILED: RIGHT RIB CAGE
LOCATION DETAILED: LEFT ULNAR DORSAL HAND
LOCATION DETAILED: LEFT PROXIMAL POSTERIOR UPPER ARM
LOCATION DETAILED: RIGHT RADIAL DORSAL HAND
LOCATION DETAILED: RIGHT SUPERIOR MEDIAL MIDBACK
LOCATION DETAILED: RIGHT PROXIMAL DORSAL FOREARM

## 2019-07-24 ASSESSMENT — LOCATION ZONE DERM
LOCATION ZONE: ARM
LOCATION ZONE: HAND
LOCATION ZONE: FACE
LOCATION ZONE: LEG
LOCATION ZONE: TRUNK
LOCATION ZONE: NECK

## 2019-07-26 ENCOUNTER — PHYSICAL THERAPY (OUTPATIENT)
Dept: PHYSICAL THERAPY | Facility: REHABILITATION | Age: 59
End: 2019-07-26
Attending: PSYCHIATRY & NEUROLOGY
Payer: COMMERCIAL

## 2019-07-26 DIAGNOSIS — M79.671 RIGHT FOOT PAIN: ICD-10-CM

## 2019-07-26 DIAGNOSIS — G20.C PARKINSONISM, UNSPECIFIED PARKINSONISM TYPE: ICD-10-CM

## 2019-07-26 PROCEDURE — 97140 MANUAL THERAPY 1/> REGIONS: CPT

## 2019-07-26 PROCEDURE — 97110 THERAPEUTIC EXERCISES: CPT

## 2019-07-26 NOTE — OP THERAPY DAILY TREATMENT
Outpatient Physical Therapy  DAILY TREATMENT     [facility] Physical [address].  [address]  [place] [address]  Phone:  [phone]  Fax:  [phone]    Date: 07/26/2019    Patient: Mariela Huff  YOB: 1960  MRN: [id number]     Time Calculation  Start time: 0832  Stop time: 0902 Time Calculation (min): 30 minutes     Chief Complaint: Foot Problem    Visit #: 5    SUBJECTIVE:  Walking uphill is more painful than walking on level surfaces.  I used to only be able to walk three houses down from my house before the cramping in my toes occurred.  Now I walked 6 houses away before the cramping came on.  I've continued toe exercises at home, and now I can get my distal toe joints to bend against the floor.    OBJECTIVE:  Current objective measures: R ankle MMTs: DF= 5/5, PF= 5/5, Abd= 3/5, add= 4/5.  R ankle AROM: DF =7deg.        Therapeutic Exercises (CPT 94731):     3. Squats on two dynadiscs, x15    4. BOSU circles, x15 CW/CCW    6. BOSU PF/DF, x15, active toe extension observed (2-4.    8. BOSU SLS, x30 sec flat surface, x30 sec round surface, Min to moderate recruitment of R toe flexors for balance    10. Tiltboard AP weight shift, x15B, with emphasis on R ankle DF and toe ext stretch when R foot behind    11. Lunge to 6' step, x10B, for ankle DF and toe ext stretch    Therapeutic Treatments and Modalities:     1. Manual Therapy (CPT 51619), R ankle DF mobs, R foot ray APs (1-5) Gr II-III.  R 2nd and 3rd MTP flexion mobs Gr II-III.  R 2nd and 3rd toes PROM flexion.  Scar mobs.    Therapeutic Treatment and Modalities Summary: Pt declined EStim this session due to time constraints.      Time-based treatments/modalities:  Manual therapy minutes (CPT 97802): 15 minutes  Therapeutic exercise minutes (CPT 88136): 15 minutes     ASSESSMENT:   Response to treatment: Increased MTP and DIP AROM of R toes.    PLAN/RECOMMENDATIONS:   Plan for treatment: therapy treatment to  continue next visit.  Planned interventions for next visit: continue with current treatment.

## 2019-08-01 ENCOUNTER — PHYSICAL THERAPY (OUTPATIENT)
Dept: PHYSICAL THERAPY | Facility: REHABILITATION | Age: 59
End: 2019-08-01
Attending: PSYCHIATRY & NEUROLOGY
Payer: COMMERCIAL

## 2019-08-01 DIAGNOSIS — G20.C PARKINSONISM, UNSPECIFIED PARKINSONISM TYPE: ICD-10-CM

## 2019-08-01 DIAGNOSIS — M79.671 RIGHT FOOT PAIN: ICD-10-CM

## 2019-08-01 PROCEDURE — 97110 THERAPEUTIC EXERCISES: CPT

## 2019-08-01 PROCEDURE — 97140 MANUAL THERAPY 1/> REGIONS: CPT

## 2019-08-01 NOTE — OP THERAPY DAILY TREATMENT
Outpatient Physical Therapy  DAILY TREATMENT     [facility] Physical [address].  [address]  [place] [address]  Phone:  [phone]  Fax:  [phone]    Date: 08/01/2019    Patient: Mariela Huff  YOB: 1960  MRN: [id number]     Time Calculation  Start time: 0830  Stop time: 0915 Time Calculation (min): 45 minutes       Chief Complaint: Foot Problem    Visit #: 6    SUBJECTIVE:  Pt reports continued plantar pain under 4th toe when walking, more so with bare feet on tile floor.  Toes get tight at night, adducted together.  Used to use CBD oil and felt like they didn't get so tight. Does not remember if she had less pain during the day.  Reports that when walking, she still lacks heel to toe rocker on R and her toes 2-4 don't touch the floor.    OBJECTIVE:  Current objective measures: AROM (in long sitting): R ankle DF=7, L ankle DF= 10.  In gait, barefoot, R ankle DF and toe extension less than L in swing.         Therapeutic Exercises (CPT 33883):     1. R ankle abd with med resistance band, x10, fatigues within 6 reps, Pt states she feels like she loses the connection between what her brain wants to do and her foot    2. R ankle add with med resistance band, x10, fatigues within 6 reps, Pt states she feels like she loses the connection between what her brain wants to do and her foot    3. Seated BAPS Level 1, x15 CW/CCW    4. Seated [facility] wipers, x15R    5. Standing toe taps, x15, r ankle DF fatigues    6. R step over with exaggerated heel first contact and toe extension, x15    Therapeutic Treatments and Modalities:     1. Manual Therapy (CPT 82959)    Time-based treatments/modalities:  Manual therapy minutes (CPT 76064): 12 minutes  Therapeutic exercise minutes (CPT 49232): 20 minutes     ASSESSMENT:   Response to treatment: Increased R ankle DF AROM, continued gait deviations and R foot pain with gait.    PLAN/RECOMMENDATIONS:   Plan for treatment:  therapy treatment to continue next visit.  Planned interventions for next visit: continue with current treatment.  Progress ankle and toe AROM and strength/endurance training in weight bearing and functional positions.

## 2019-08-06 ENCOUNTER — APPOINTMENT (OUTPATIENT)
Dept: PHYSICAL THERAPY | Facility: REHABILITATION | Age: 59
End: 2019-08-06
Attending: PSYCHIATRY & NEUROLOGY
Payer: COMMERCIAL

## 2019-08-09 ENCOUNTER — PHYSICAL THERAPY (OUTPATIENT)
Dept: PHYSICAL THERAPY | Facility: REHABILITATION | Age: 59
End: 2019-08-09
Attending: PSYCHIATRY & NEUROLOGY
Payer: COMMERCIAL

## 2019-08-09 DIAGNOSIS — G20.C PARKINSONISM, UNSPECIFIED PARKINSONISM TYPE: ICD-10-CM

## 2019-08-09 DIAGNOSIS — M79.671 RIGHT FOOT PAIN: ICD-10-CM

## 2019-08-09 PROCEDURE — 97110 THERAPEUTIC EXERCISES: CPT

## 2019-08-09 PROCEDURE — 97140 MANUAL THERAPY 1/> REGIONS: CPT

## 2019-08-09 NOTE — OP THERAPY DISCHARGE SUMMARY
Outpatient Physical Therapy  DISCHARGE SUMMARY NOTE      [facility] Therapy [address].  [address]  [place] [address]  Phone:  [phone]  Fax:  [phone]    Date of Visit: 08/09/2019    Patient: Mariela Huff  YOB: 1960  MRN: [id number]     Referring Provider: Austyn Calvillo M.D.  [address] [place] [id number]   Referring Diagnosis Parkinson's disease [G20]     Physical Therapy Occurrence Codes    Date of onset of impairment:  5/7/18   Date physical therapy care plan established or reviewed:  6/27/19   Date physical therapy treatment started:  6/27/19          Your patient is being discharged from Physical Therapy with the following comments:   · Progress plateau    Comments:  Mild improvement in ankle DF ROM and gait, but still painful cramping with gait and limited R toe 2-4 AROM flexion and extension.    Recommendations:  Continue HEP.  Pt states she may request referral to see specialist regarding Botox injection.    Parvin Concepcion, PT    Date: 8/9/2019

## 2019-08-16 RX ORDER — LEVOTHYROXINE SODIUM 88 UG/1
88 TABLET ORAL
Qty: 90 TAB | Refills: 0 | Status: SHIPPED | OUTPATIENT
Start: 2019-08-16 | End: 2019-08-26 | Stop reason: SDUPTHER

## 2019-08-19 ENCOUNTER — TELEPHONE (OUTPATIENT)
Dept: MEDICAL GROUP | Facility: PHYSICIAN GROUP | Age: 59
End: 2019-08-19

## 2019-08-19 ENCOUNTER — HOSPITAL ENCOUNTER (OUTPATIENT)
Dept: LAB | Facility: MEDICAL CENTER | Age: 59
End: 2019-08-19
Attending: FAMILY MEDICINE
Payer: COMMERCIAL

## 2019-08-19 DIAGNOSIS — Z11.59 NEED FOR HEPATITIS C SCREENING TEST: ICD-10-CM

## 2019-08-19 DIAGNOSIS — Z13.6 SCREENING FOR CARDIOVASCULAR CONDITION: ICD-10-CM

## 2019-08-19 DIAGNOSIS — D58.2 ELEVATED HEMOGLOBIN (HCC): ICD-10-CM

## 2019-08-19 DIAGNOSIS — E03.9 HYPOTHYROIDISM, UNSPECIFIED TYPE: ICD-10-CM

## 2019-08-19 LAB
BASOPHILS # BLD AUTO: 0.9 % (ref 0–1.8)
BASOPHILS # BLD: 0.04 K/UL (ref 0–0.12)
CHOLEST SERPL-MCNC: 189 MG/DL (ref 100–199)
EOSINOPHIL # BLD AUTO: 0.15 K/UL (ref 0–0.51)
EOSINOPHIL NFR BLD: 3.5 % (ref 0–6.9)
ERYTHROCYTE [DISTWIDTH] IN BLOOD BY AUTOMATED COUNT: 40.4 FL (ref 35.9–50)
FASTING STATUS PATIENT QL REPORTED: NORMAL
HCT VFR BLD AUTO: 47 % (ref 37–47)
HCV AB SER QL: NEGATIVE
HDLC SERPL-MCNC: 76 MG/DL
HGB BLD-MCNC: 15.3 G/DL (ref 12–16)
IMM GRANULOCYTES # BLD AUTO: 0.01 K/UL (ref 0–0.11)
IMM GRANULOCYTES NFR BLD AUTO: 0.2 % (ref 0–0.9)
LDLC SERPL CALC-MCNC: 100 MG/DL
LYMPHOCYTES # BLD AUTO: 1.62 K/UL (ref 1–4.8)
LYMPHOCYTES NFR BLD: 37.8 % (ref 22–41)
MCH RBC QN AUTO: 31 PG (ref 27–33)
MCHC RBC AUTO-ENTMCNC: 32.6 G/DL (ref 33.6–35)
MCV RBC AUTO: 95.1 FL (ref 81.4–97.8)
MONOCYTES # BLD AUTO: 0.33 K/UL (ref 0–0.85)
MONOCYTES NFR BLD AUTO: 7.7 % (ref 0–13.4)
NEUTROPHILS # BLD AUTO: 2.14 K/UL (ref 2–7.15)
NEUTROPHILS NFR BLD: 49.9 % (ref 44–72)
NRBC # BLD AUTO: 0 K/UL
NRBC BLD-RTO: 0 /100 WBC
PLATELET # BLD AUTO: 245 K/UL (ref 164–446)
PMV BLD AUTO: 10.5 FL (ref 9–12.9)
RBC # BLD AUTO: 4.94 M/UL (ref 4.2–5.4)
TRIGL SERPL-MCNC: 64 MG/DL (ref 0–149)
TSH SERPL DL<=0.005 MIU/L-ACNC: 1.3 UIU/ML (ref 0.38–5.33)
WBC # BLD AUTO: 4.3 K/UL (ref 4.8–10.8)

## 2019-08-19 PROCEDURE — 86803 HEPATITIS C AB TEST: CPT

## 2019-08-19 PROCEDURE — 36415 COLL VENOUS BLD VENIPUNCTURE: CPT

## 2019-08-19 PROCEDURE — 80061 LIPID PANEL: CPT

## 2019-08-19 PROCEDURE — 84443 ASSAY THYROID STIM HORMONE: CPT

## 2019-08-19 PROCEDURE — 85025 COMPLETE CBC W/AUTO DIFF WBC: CPT

## 2019-08-19 NOTE — TELEPHONE ENCOUNTER
Phone Number Called: [phone] (home)     Call outcome: left message for patient to call back regarding message below    Message: Left message for patient in regards to dr. Acharya message

## 2019-08-19 NOTE — TELEPHONE ENCOUNTER
Pt is here because she just did her labs today and the pharmacy wont refill her synthroid until they get an order from Dr. Bragg. Please call pt with any questions. She has enough pills for a week.

## 2019-08-19 NOTE — TELEPHONE ENCOUNTER
1. Caller Name: Mariela Huff                                           Call Back Number: [phone] (home)         Patient approves a detailed voicemail message: N\A    I let patient know Dr. Acharya message: Please call her and tell her that I am still waiting on the results of the thyroid test. However, I did send in a 30-day refill the levothyroxine on 8/16/2019.

## 2019-08-26 RX ORDER — LEVOTHYROXINE SODIUM 88 UG/1
88 TABLET ORAL
Qty: 90 TAB | Refills: 3 | Status: SHIPPED | OUTPATIENT
Start: 2019-08-26 | End: 2020-08-03

## 2019-09-21 DIAGNOSIS — G20.C PARKINSONISM, UNSPECIFIED PARKINSONISM TYPE: ICD-10-CM

## 2019-09-23 NOTE — TELEPHONE ENCOUNTER
Was the patient seen in the last year in this department? Yes    Does patient have an active prescription for medications requested? Yes    Received Request Via: Pharmacy     Pt scheduled 12/13/19 with dr Calvillo

## 2019-09-24 RX ORDER — TRIHEXYPHENIDYL HYDROCHLORIDE 2 MG/1
TABLET ORAL
Qty: 90 TAB | Refills: 3 | Status: SHIPPED | OUTPATIENT
Start: 2019-09-24 | End: 2019-12-17

## 2019-12-13 ENCOUNTER — OFFICE VISIT (OUTPATIENT)
Dept: NEUROLOGY | Facility: MEDICAL CENTER | Age: 59
End: 2019-12-13
Payer: COMMERCIAL

## 2019-12-13 DIAGNOSIS — G20.C PARKINSONISM, UNSPECIFIED PARKINSONISM TYPE: Primary | ICD-10-CM

## 2019-12-13 PROCEDURE — 99213 OFFICE O/P EST LOW 20 MIN: CPT | Performed by: PSYCHIATRY & NEUROLOGY

## 2019-12-17 RX ORDER — TRIHEXYPHENIDYL HYDROCHLORIDE 2 MG/1
2 TABLET ORAL 3 TIMES DAILY
Qty: 90 TAB | Refills: 5 | Status: SHIPPED | OUTPATIENT
Start: 2019-12-17 | End: 2019-12-31 | Stop reason: SDUPTHER

## 2019-12-17 ASSESSMENT — ENCOUNTER SYMPTOMS
MEMORY LOSS: 0
DEPRESSION: 0
TREMORS: 1
CONSTIPATION: 0

## 2019-12-18 NOTE — PROGRESS NOTES
Subjective:      Mariela Huff is a 59 y.o. female who presents for follow-up, with a history of atypical Parkinson's disease.    HPI    Still on Artane 2 mg twice a day, the right-sided parkinsonian symptoms do continue to respond consistently, she takes the drug at 830 and then 3 PM, this helps, the problem is that she is much stiffer in the mornings, and she has to wait couple of hours before she begins to notice benefit.  She is tolerating the Artane without issues of dry mouth, constipation, etc.    She denies dyskinesias, wearing-off, daily fluctuations, etc.  The right lower extremity still cramps on her with this dosing regimen, especially as she walks longer distances.  The right leg moves in the stiff fashion, she places the foot in a flat sense not with a heel-to-toe roll.  She feels like it is Frankenstein.    Medical, surgical and family histories are reviewed, there are no new drug allergies.  She is on Artane 2 mg, twice daily, and Synthroid along with a multivitamin daily.    Review of Systems   Gastrointestinal: Negative for constipation.   Neurological: Positive for tremors.   Psychiatric/Behavioral: Negative for depression and memory loss.   All other systems reviewed and are negative.       Objective:     Physical Exam    She appears in no acute distress.  Vital signs are stable.  Her blood pressure is 118/60, pulse is 88 and regular, respiratory rate 14.  She is 5 feet tall, 108 pounds.  There is no malar rash or sialorrhea.  Her neck is supple, range of motion is full.  Cardiac evaluation is unremarkable.    Fully oriented, there is no aphasia or inattention.  PERRLA/EOMI, eye blink frequency is still slightly diminished, there are no dyskinesias, facial movements are symmetric, and there is no hypophonia or tachyphemia.  Sensory exam is intact, the tongue and uvula are midline.    Musculoskeletal exam again reveals the right-sided rigidity and tremor at rest, increasing with  distraction.  There is no asterixis or drift.  Strength is intact.  There are no reflex asymmetries.    She stands easily, when she walks the right leg again is moved stiffly, stride length slightly diminished when compared to the left.  Arm swing on the right is nearly absent, the tremor with the right hand increases.  There is no appendicular dystaxia.  Repetitive movements are diminished in amplitude with both hands.    Sensory exam is normal.     Assessment/Plan:     1. Parkinsonism, unspecified Parkinsonism type (HCC)  We will increase her Artane, adding a third dose beginning at 7 AM, 2 PM and then 9 PM.  This would allow some time in the morning for the drug to work before she actually has to get out of bed.  Hopefully will help with the a.m. stiffness overall.  I will also refer her to the [facility] movement disorder center, Dr. Fisher, at al for a second opinion.  We will follow-up in the next for 5 months.    - trihexyphenidyl (ARTANE) 2 MG Tab; Take 1 Tab by mouth 3 times a day.  Dispense: 90 Tab; Refill: 5  - REFERRAL TO NEUROLOGY    Time: 20-minute spent face-to-face for exam, review, discussion, and education, of this over 50% of the time spent counseling and coordinating care.

## 2019-12-23 DIAGNOSIS — G20.C PARKINSONISM, UNSPECIFIED PARKINSONISM TYPE: ICD-10-CM

## 2019-12-31 RX ORDER — TRIHEXYPHENIDYL HYDROCHLORIDE 2 MG/1
2 TABLET ORAL 3 TIMES DAILY
Qty: 90 TAB | Refills: 5 | Status: SHIPPED | OUTPATIENT
Start: 2019-12-31 | End: 2020-06-05 | Stop reason: SDUPTHER

## 2020-01-07 ENCOUNTER — OFFICE VISIT (OUTPATIENT)
Dept: MEDICAL GROUP | Facility: PHYSICIAN GROUP | Age: 60
End: 2020-01-07
Payer: COMMERCIAL

## 2020-01-07 VITALS
WEIGHT: 108.8 LBS | DIASTOLIC BLOOD PRESSURE: 68 MMHG | HEIGHT: 60 IN | RESPIRATION RATE: 16 BRPM | TEMPERATURE: 98.3 F | HEART RATE: 80 BPM | SYSTOLIC BLOOD PRESSURE: 104 MMHG | OXYGEN SATURATION: 97 % | BODY MASS INDEX: 21.36 KG/M2

## 2020-01-07 DIAGNOSIS — Z00.00 WELLNESS EXAMINATION: ICD-10-CM

## 2020-01-07 PROCEDURE — 99396 PREV VISIT EST AGE 40-64: CPT | Performed by: FAMILY MEDICINE

## 2020-01-07 RX ORDER — UBIDECARENONE 75 MG
100 CAPSULE ORAL DAILY
COMMUNITY
End: 2020-04-29

## 2020-01-07 ASSESSMENT — PATIENT HEALTH QUESTIONNAIRE - PHQ9: CLINICAL INTERPRETATION OF PHQ2 SCORE: 0

## 2020-01-07 NOTE — PROGRESS NOTES
Subjective:     CC:   Chief Complaint   Patient presents with   • Annual Exam       HPI:   Mariela Huff is a 59 y.o. female who presents for annual exam. She is feeling well and denies any complaints.    Ob-Gyn/ History:    Patient has GYN provider: no  /Para:  2/2  Last Pap Smear:  unknown. No history of abnormal pap smears.  Gyn Surgery:  hysterectomy.  Current Contraceptive Method:  N/a. Yes currently sexually active.  Last menstrual period:  N/a.  No significant bloating/fluid retention, pelvic pain, or dyspareunia. No vaginal discharge  Post-menopausal bleeding: no  Urinary incontinence: no  Folate intake: n/a     Health Maintenance  Advanced directive: n/a   Osteoporosis Screen/ DEXA: n/a   PT/vit D for falls prevention: n/a   Cholesterol Screenin2019 - T chol 189, TG 64, HDL 76, ; ASCVD 2%   Diabetes Screening: 3/2018 - fasting glucose 73   Aspirin Use: n/a    Diet: healthy   Exercise: boxing class 3 times/week   Substance Abuse: no   Seat belts, bike helmet, gun safety discussed.  Sun protection used.    Cancer screening  Colorectal Cancer Screening: due     Lung Cancer Screening: n/a    Cervical Cancer Screening: n/a   Breast Cancer Screening: due 2020     Infectious disease screening/Immunizations  --STI Screening: declined   --Practices safe sex.  --HIV Screening: declined   --Hepatitis C Screening: completed - negative   --Immunizations:    Influenza: declined    HPV:  n/a    Tetanus: due     Shingles: recommended   Pneumococcal : recommended per chart, unknown why    Other immunizations: n/a     She  has a past medical history of Anesthesia, Arthritis, Diverticulosis (), Hypothyroid, Infectious disease, Lyme disease, Pain (2018), and Parkinsonism (HCC) (2018).  She  has a past surgical history that includes hysterectomy, vaginal (); shoulder decompression arthroscopic (2009); rotator cuff repair (2009); other orthopedic surgery ();  shoulder arthroscopy (12/1/2010); shoulder decompression (12/1/2010); rotator cuff repair (12/1/2010); breast biopsy (1/24/2012); pr enlarge breast with implant (Bilateral, 1999); us-needle core bx-breast panel; breast implant revision (Bilateral, 4/11/2016); bunionectomy (Right, 5/1/2017); neuroma excision (5/1/2017); toe fusion (Right, 5/7/2018); flexor tendon repair (Right, 5/7/2018); hardware removal ortho (Right, 5/7/2018); and orthopedic osteotomy (Right, 5/7/2018).    Family History   Problem Relation Age of Onset   • Cancer Paternal Aunt         breast   • Diabetes Mother    • Thyroid Mother    • Diabetes Father    • Thyroid Daughter    • Thyroid Sister    • Cancer Paternal Aunt         breast, uterine   • Cancer Paternal Aunt         bladder   • Cancer Paternal Aunt         colon   • Heart Disease Neg Hx    • Stroke Neg Hx    • Alcohol/Drug Neg Hx        Social History     Socioeconomic History   • Marital status:      Spouse name: Not on file   • Number of children: Not on file   • Years of education: Not on file   • Highest education level: Not on file   Occupational History   • Not on file   Social Needs   • Financial resource strain: Not on file   • Food insecurity:     Worry: Not on file     Inability: Not on file   • Transportation needs:     Medical: Not on file     Non-medical: Not on file   Tobacco Use   • Smoking status: Never Smoker   • Smokeless tobacco: Never Used   Substance and Sexual Activity   • Alcohol use: Yes     Comment: Occasionally   • Drug use: No   • Sexual activity: Yes     Partners: Male   Lifestyle   • Physical activity:     Days per week: Not on file     Minutes per session: Not on file   • Stress: Not on file   Relationships   • Social connections:     Talks on phone: Not on file     Gets together: Not on file     Attends Mandaen service: Not on file     Active member of club or organization: Not on file     Attends meetings of clubs or organizations: Not on file      Relationship status: Not on file   • Intimate partner violence:     Fear of current or ex partner: Not on file     Emotionally abused: Not on file     Physically abused: Not on file     Forced sexual activity: Not on file   Other Topics Concern   • Not on file   Social History Narrative   • Not on file       Patient Active Problem List    Diagnosis Date Noted   • Arthralgia 02/28/2018   • Hypothyroidism 01/24/2018   • Parkinsonism (HCC) 01/24/2018         Current Outpatient Medications   Medication Sig Dispense Refill   • cyanocobalamin (VITAMIN B-12) 100 MCG Tab Take 100 mcg by mouth every day.     • trihexyphenidyl (ARTANE) 2 MG Tab Take 1 Tab by mouth 3 times a day. 90 Tab 5   • levothyroxine (SYNTHROID) 88 MCG Tab Take 1 Tab by mouth Every morning on an empty stomach. 90 Tab 3   • Multiple Vitamin (MULTI VITAMIN PO) Take 1 Tab by mouth every day.       No current facility-administered medications for this visit.      Allergies   Allergen Reactions   • Pcn [Penicillins] Rash     Rash     • Percocet [Oxycodone-Acetaminophen] Vomiting       Review of Systems   Constitutional: Negative for fever, chills.   HENT: Negative for congestion.  +Rhinorrhea  Eyes: Negative for pain.   Respiratory: Negative for shortness of breath.    Cardiovascular: Negative for leg swelling.   Gastrointestinal: Negative for diarrhea.   Genitourinary: Negative for hematuria.   Skin: Negative for rash.   Neurological: Positive for headaches - occasionally.  Endo/Heme/Allergies: Does not bruise/bleed easily.   Psychiatric/Behavioral: Negative for depression.  The patient is not nervous/anxious.      Objective:     /68 (BP Location: Right arm, Patient Position: Sitting, BP Cuff Size: Adult)   Pulse 80   Temp 36.8 °C (98.3 °F) (Temporal)   Resp 16   Ht 1.524 m (5')   Wt 49.4 kg (108 lb 12.8 oz)   SpO2 97%   BMI 21.25 kg/m²   Body mass index is 21.25 kg/m².  Wt Readings from Last 4 Encounters:   01/07/20 49.4 kg (108 lb 12.8 oz)    07/12/19 48 kg (105 lb 13.1 oz)   07/01/19 49.4 kg (108 lb 12.8 oz)   06/13/19 49.7 kg (109 lb 9.6 oz)       Physical Exam:  Constitutional: Well-developed and well-nourished. Not diaphoretic. No distress.   Skin: Skin is warm and dry. No rash noted.  Head: Atraumatic without lesions.  Eyes: Conjunctivae and extraocular motions are normal. Pupils are equal, round, and reactive to light. No scleral icterus.   Ears:  External ears unremarkable. Tympanic membranes clear and intact.  Mouth/Throat: Dentition is good. Tongue normal. Oropharynx is clear and moist. Posterior pharynx without erythema or exudates.  Neck: Supple, trachea midline. Normal range of motion. No thyromegaly present. No lymphadenopathy--cervical or supraclavicular.  Cardiovascular: Regular rate and rhythm, S1 and S2 without murmur, rubs, or gallops.  Lungs: Normal inspiratory effort, CTA bilaterally, no wheezes/rhonchi/rales  Abdomen: Soft, non tender, and without distention. Active bowel sounds in all four quadrants. No rebound, guarding, masses or HSM.  Extremities: No cyanosis, clubbing, erythema, nor edema. Distal pulses intact and symmetric.   Musculoskeletal: All major joints AROM full in all directions without pain.  Neurological: Alert and oriented x 3. Slight resting tremor of right hand. DTRs 2+/3 and symmetric. No cranial nerve deficit. 5/5 myotomes. Sensation intact.  Psychiatric:  Behavior, mood, and affect are appropriate.    Assessment and Plan:     1. Wellness examination       HCM:  Up to date   Labs per orders  Immunizations per orders  Patient counseled about skin care, diet, supplements, prenatal vitamins, safe sex and exercise.    Follow-up: Return in about 1 year (around 1/7/2021) for Annual/wellness visit.

## 2020-01-07 NOTE — PATIENT INSTRUCTIONS
[facility] DEPARTMENT OF NEUROLOGY  [address] [place]  [address]  Phone:  [phone]

## 2020-03-22 ENCOUNTER — OFFICE VISIT (OUTPATIENT)
Dept: URGENT CARE | Facility: CLINIC | Age: 60
End: 2020-03-22
Payer: COMMERCIAL

## 2020-03-22 ENCOUNTER — APPOINTMENT (OUTPATIENT)
Dept: RADIOLOGY | Facility: IMAGING CENTER | Age: 60
End: 2020-03-22
Attending: NURSE PRACTITIONER
Payer: COMMERCIAL

## 2020-03-22 VITALS
SYSTOLIC BLOOD PRESSURE: 110 MMHG | WEIGHT: 108.8 LBS | OXYGEN SATURATION: 95 % | RESPIRATION RATE: 16 BRPM | BODY MASS INDEX: 21.36 KG/M2 | DIASTOLIC BLOOD PRESSURE: 64 MMHG | HEIGHT: 60 IN | TEMPERATURE: 97.9 F | HEART RATE: 102 BPM

## 2020-03-22 DIAGNOSIS — J06.9 ACUTE URI: ICD-10-CM

## 2020-03-22 DIAGNOSIS — R05.9 COUGH: ICD-10-CM

## 2020-03-22 DIAGNOSIS — J40 BRONCHITIS: ICD-10-CM

## 2020-03-22 PROCEDURE — 71046 X-RAY EXAM CHEST 2 VIEWS: CPT | Mod: TC | Performed by: NURSE PRACTITIONER

## 2020-03-22 PROCEDURE — 99214 OFFICE O/P EST MOD 30 MIN: CPT | Performed by: NURSE PRACTITIONER

## 2020-03-22 RX ORDER — ALBUTEROL SULFATE 90 UG/1
2 AEROSOL, METERED RESPIRATORY (INHALATION) EVERY 6 HOURS PRN
Qty: 8.5 G | Refills: 0 | Status: SHIPPED | OUTPATIENT
Start: 2020-03-22 | End: 2020-04-23 | Stop reason: SDUPTHER

## 2020-03-22 ASSESSMENT — ENCOUNTER SYMPTOMS
FEVER: 0
COUGH: 1
SHORTNESS OF BREATH: 1
CHILLS: 0

## 2020-03-22 ASSESSMENT — FIBROSIS 4 INDEX: FIB4 SCORE: 1.23

## 2020-03-22 NOTE — PROGRESS NOTES
Subjective:      Mariela Huff is a 59 y.o. female who presents with Cough (x4days, fever, cough, chest tightness, shortness of breath)    Past Medical History:   Diagnosis Date   • Anesthesia     severe ponv, hard to wake up   • Arthritis     shoulders   • Diverticulosis 2012    pt stated it was noted on colonoscopy   • Hypothyroid     hypothyroid   • Infectious disease     around bronchitis/flu 12/2011   • Lyme disease     chronic   • Pain 04/2018    right foot, bilateral shoulder   • Parkinsonism (HCC) 1/24/2018     Social History     Socioeconomic History   • Marital status:      Spouse name: Not on file   • Number of children: Not on file   • Years of education: Not on file   • Highest education level: Not on file   Occupational History   • Not on file   Social Needs   • Financial resource strain: Not on file   • Food insecurity     Worry: Not on file     Inability: Not on file   • Transportation needs     Medical: Not on file     Non-medical: Not on file   Tobacco Use   • Smoking status: Never Smoker   • Smokeless tobacco: Never Used   Substance and Sexual Activity   • Alcohol use: Yes     Comment: Occasionally   • Drug use: No   • Sexual activity: Yes     Partners: Male   Lifestyle   • Physical activity     Days per week: Not on file     Minutes per session: Not on file   • Stress: Not on file   Relationships   • Social connections     Talks on phone: Not on file     Gets together: Not on file     Attends Uatsdin service: Not on file     Active member of club or organization: Not on file     Attends meetings of clubs or organizations: Not on file     Relationship status: Not on file   • Intimate partner violence     Fear of current or ex partner: Not on file     Emotionally abused: Not on file     Physically abused: Not on file     Forced sexual activity: Not on file   Other Topics Concern   • Not on file   Social History Narrative   • Not on file     Family History   Problem Relation Age  of Onset   • Cancer Paternal Aunt         breast   • Diabetes Mother    • Thyroid Mother    • Diabetes Father    • Thyroid Daughter    • Thyroid Sister    • Cancer Paternal Aunt         breast, uterine   • Cancer Paternal Aunt         bladder   • Cancer Paternal Aunt         colon   • Heart Disease Neg Hx    • Stroke Neg Hx    • Alcohol/Drug Neg Hx        Allergies: Pcn [penicillins] and Percocet [oxycodone-acetaminophen]    Patient is a 59-year-old female who presents today with complaint of tightness in her chest, shortness of breath, and mild cough.  Symptoms started over the last 3 days.  No fever, body aches, or chills.          Cough   This is a new problem. The problem has been unchanged. The problem occurs every few minutes. Associated symptoms include shortness of breath. Pertinent negatives include no chills or fever. Nothing aggravates the symptoms. She has tried nothing for the symptoms. The treatment provided no relief.       Review of Systems   Constitutional: Negative for chills and fever.   Respiratory: Positive for cough and shortness of breath.    All other systems reviewed and are negative.         Objective:     /64 (BP Location: Left arm, Patient Position: Sitting, BP Cuff Size: Adult)   Pulse (!) 102   Temp 36.6 °C (97.9 °F) (Temporal)   Resp 16   Ht 1.524 m (5')   Wt 49.4 kg (108 lb 12.8 oz)   SpO2 95%   BMI 21.25 kg/m²      Physical Exam  Vitals signs reviewed.   Constitutional:       Appearance: Normal appearance.   HENT:      Head: Normocephalic and atraumatic.      Right Ear: Tympanic membrane, ear canal and external ear normal.      Left Ear: Tympanic membrane, ear canal and external ear normal.      Nose: Nose normal.      Mouth/Throat:      Mouth: Mucous membranes are moist.   Eyes:      Extraocular Movements: Extraocular movements intact.      Conjunctiva/sclera: Conjunctivae normal.      Pupils: Pupils are equal, round, and reactive to light.   Neck:      Musculoskeletal:  Normal range of motion and neck supple.   Cardiovascular:      Rate and Rhythm: Normal rate and regular rhythm.      Pulses: Normal pulses.      Heart sounds: Normal heart sounds.   Pulmonary:      Effort: Pulmonary effort is normal. No respiratory distress.      Breath sounds: Normal breath sounds. No stridor. No wheezing, rhonchi or rales.   Chest:      Chest wall: No tenderness.   Musculoskeletal: Normal range of motion.   Skin:     General: Skin is warm and dry.   Neurological:      General: No focal deficit present.      Mental Status: She is alert and oriented to person, place, and time.   Psychiatric:         Mood and Affect: Mood normal.         Behavior: Behavior normal.         Thought Content: Thought content normal.         Judgment: Judgment normal.       X-ray, chest:        HISTORY/REASON FOR EXAM:  Cough     TECHNIQUE/EXAM DESCRIPTION AND NUMBER OF VIEWS:  Two views of the chest.     COMPARISON: 7/12/2019     FINDINGS:  There is no evidence of focal consolidation or evidence of pulmonary edema.  The heart is normal in size.  There is no evidence of pleural effusion.  There are bilateral breast implants.        IMPRESSION:     No evidence of acute cardiopulmonary process.           Assessment/Plan:     1. Acute URI  2. Cough  3.  Bronchitis    Albuterol  Over-the-counter cough syrup of choice  Return to urgent care for any further questions or concerns  Strict ER precautions for respiratory distress6

## 2020-03-24 ENCOUNTER — TELEPHONE (OUTPATIENT)
Dept: MEDICAL GROUP | Facility: PHYSICIAN GROUP | Age: 60
End: 2020-03-24

## 2020-03-24 NOTE — TELEPHONE ENCOUNTER
VOICEMAIL  1. Caller Name: Mariela Huff  Call Back Number: [phone] (home)     2. Message: Patient was seen at  on 3/22/20 and diagnosed with bronchitis and given an inhaler which seemed to be effective on Sun/Mon however today does not seem to be effective.  She is not running a fever however is asking what the next course of treatment is?    3. Patient approves office to leave a detailed voicemail/MyChart message: yes

## 2020-03-24 NOTE — TELEPHONE ENCOUNTER
Phone Number Called: [phone] (home)     Call outcome: Spoke to patient regarding message below.    Message: Spoke with patient and let them know Abi Bragg M.D.'s message.

## 2020-03-25 RX ORDER — METHYLPREDNISOLONE 4 MG/1
TABLET ORAL
Qty: 21 TAB | Refills: 0 | Status: SHIPPED | OUTPATIENT
Start: 2020-03-25 | End: 2020-04-06

## 2020-03-30 ENCOUNTER — OFFICE VISIT (OUTPATIENT)
Dept: URGENT CARE | Facility: CLINIC | Age: 60
End: 2020-03-30
Payer: COMMERCIAL

## 2020-03-30 VITALS
RESPIRATION RATE: 18 BRPM | WEIGHT: 107 LBS | HEART RATE: 78 BPM | SYSTOLIC BLOOD PRESSURE: 122 MMHG | OXYGEN SATURATION: 97 % | TEMPERATURE: 97 F | BODY MASS INDEX: 21.01 KG/M2 | HEIGHT: 60 IN | DIASTOLIC BLOOD PRESSURE: 86 MMHG

## 2020-03-30 DIAGNOSIS — R05.8 POST-VIRAL COUGH SYNDROME: ICD-10-CM

## 2020-03-30 DIAGNOSIS — R07.89 SENSATION OF CHEST TIGHTNESS: ICD-10-CM

## 2020-03-30 PROCEDURE — 99214 OFFICE O/P EST MOD 30 MIN: CPT | Performed by: NURSE PRACTITIONER

## 2020-03-30 ASSESSMENT — FIBROSIS 4 INDEX: FIB4 SCORE: 1.23

## 2020-03-30 ASSESSMENT — ENCOUNTER SYMPTOMS
FEVER: 0
COUGH: 1
SPUTUM PRODUCTION: 0
SORE THROAT: 0
SHORTNESS OF BREATH: 1

## 2020-03-30 ASSESSMENT — COPD QUESTIONNAIRES: COPD: 0

## 2020-03-30 NOTE — PROGRESS NOTES
Subjective:      Mariela Huff is a 59 y.o. female who presents with Shortness of Breath (chest tightness,dx with bronchitis (negative covid-19)-x1.5 week)            Shortness of Breath   This is a new problem. Episode onset: pt reports new onset of cold symptoms that started over a week ago with fatigue and cough. She was seen in the  last week and given an inhaler, which seemed to help slightly. She reports worsening sensation of chest tightness in the last few days. The problem occurs intermittently. The problem has been gradually worsening (pt reports new onset of chest tightness in the last few days). Pertinent negatives include no fever, sore throat or sputum production. Associated symptoms comments: Pt reports she was tested for COVID 19 over the weekend and learned last night she is negative. She has tried beta agonist inhalers (pt reports she did receive a taper of steroids from her PCP but has yet to start them ) for the symptoms. The treatment provided mild relief. There is no history of allergies, asthma, COPD or pneumonia.       Review of Systems   Constitutional: Negative for fever.   HENT: Negative for congestion and sore throat.    Respiratory: Positive for cough and shortness of breath. Negative for sputum production.    All other systems reviewed and are negative.    Past Medical History:   Diagnosis Date   • Anesthesia     severe ponv, hard to wake up   • Arthritis     shoulders   • Diverticulosis 2012    pt stated it was noted on colonoscopy   • Hypothyroid     hypothyroid   • Infectious disease     around bronchitis/flu 12/2011   • Lyme disease     chronic   • Pain 04/2018    right foot, bilateral shoulder   • Parkinsonism (HCC) 1/24/2018      Past Surgical History:   Procedure Laterality Date   • TOE FUSION Right 5/7/2018    Procedure: TOE FUSION - 1ST MTP;  Surgeon: Janes Benavidez M.D.;  Location: SURGERY SAME DAY [facility];  Service: Orthopedics   • FLEXOR TENDON REPAIR  Right 5/7/2018    Procedure: FLEXOR TENDON REPAIR - 4TH RELEASE W/SOFT TISSUE RELEASE 2/3;  Surgeon: Janes Benavidez M.D.;  Location: SURGERY SAME DAY [facility];  Service: Orthopedics   • HARDWARE REMOVAL ORTHO Right 5/7/2018    Procedure: HARDWARE REMOVAL ORTHO - INTERNAL FIXATION;  Surgeon: Janes Benavidez M.D.;  Location: SURGERY SAME DAY [facility];  Service: Orthopedics   • ORTHOPEDIC OSTEOTOMY Right 5/7/2018    Procedure: ORTHOPEDIC OSTEOTOMY - DISTAL METATARSAL 2/3/4;  Surgeon: Janes Benavidez M.D.;  Location: SURGERY SAME DAY [facility];  Service: Orthopedics   • BUNIONECTOMY Right 5/1/2017    Procedure: BUNIONECTOMY - PROXIMAL HALLUX VALGUS CORRECTION W/BONE GRAFT;  Surgeon: Janes Benavidez M.D.;  Location: SURGERY [facility];  Service:    • NEUROMA EXCISION  5/1/2017    Procedure: NEUROMA EXCISION - 2ND WEBSPACE;  Surgeon: Janes Benavidez M.D.;  Location: SURGERY [facility];  Service:    • BREAST IMPLANT REVISION Bilateral 4/11/2016    Procedure: BREAST IMPLANT EXCHANGE OF SALINE TO SILICONE W/REPOSITIONING OF LATERAL FOLDS;  Surgeon: Mikey Adkins M.D.;  Location: SURGERY [facility];  Service:    • BREAST BIOPSY  1/24/2012    Performed by SANDRA ESTRADA at SURGERY SAME DAY [facility]   • SHOULDER ARTHROSCOPY  12/1/2010    Performed by KATE CHANCE at [facility]   • SHOULDER DECOMPRESSION  12/1/2010    Performed by KATE CHANCE at SURGERY [facility]   • ROTATOR CUFF REPAIR  12/1/2010    Performed by KATE CHANCE at SURGERY [facility]   • SHOULDER DECOMPRESSION ARTHROSCOPIC  5/13/2009    Performed by KATE CHANCE at SURGERY [facility]   • ROTATOR CUFF REPAIR  5/13/2009    Performed by KATE CHANCE at [facility]   • HYSTERECTOMY, VAGINAL  1999    fibroids   • PB ENLARGE BREAST WITH IMPLANT Bilateral 1999   • OTHER ORTHOPEDIC SURGERY  1993    joint repair left thumb   • US-NEEDLE CORE BX-BREAST PANEL        Social  History     Socioeconomic History   • Marital status:      Spouse name: Not on file   • Number of children: Not on file   • Years of education: Not on file   • Highest education level: Not on file   Occupational History   • Not on file   Social Needs   • Financial resource strain: Not on file   • Food insecurity     Worry: Not on file     Inability: Not on file   • Transportation needs     Medical: Not on file     Non-medical: Not on file   Tobacco Use   • Smoking status: Never Smoker   • Smokeless tobacco: Never Used   Substance and Sexual Activity   • Alcohol use: Yes     Comment: Occasionally   • Drug use: No   • Sexual activity: Yes     Partners: Male   Lifestyle   • Physical activity     Days per week: Not on file     Minutes per session: Not on file   • Stress: Not on file   Relationships   • Social connections     Talks on phone: Not on file     Gets together: Not on file     Attends Gnosticism service: Not on file     Active member of club or organization: Not on file     Attends meetings of clubs or organizations: Not on file     Relationship status: Not on file   • Intimate partner violence     Fear of current or ex partner: Not on file     Emotionally abused: Not on file     Physically abused: Not on file     Forced sexual activity: Not on file   Other Topics Concern   • Not on file   Social History Narrative   • Not on file          Objective:     /86 (BP Location: Left arm)   Pulse 78   Temp 36.1 °C (97 °F) (Temporal)   Resp 18   Ht 1.524 m (5')   Wt 48.5 kg (107 lb)   SpO2 97%   BMI 20.90 kg/m²      Physical Exam  Vitals signs and nursing note reviewed.   Constitutional:       Appearance: Normal appearance. She is normal weight.   HENT:      Head: Normocephalic and atraumatic.      Right Ear: Tympanic membrane and external ear normal.      Left Ear: Tympanic membrane and external ear normal.      Nose: Nose normal.      Mouth/Throat:      Mouth: Mucous membranes are moist.       Pharynx: Oropharynx is clear.   Eyes:      Extraocular Movements: Extraocular movements intact.      Pupils: Pupils are equal, round, and reactive to light.   Neck:      Musculoskeletal: Normal range of motion.   Cardiovascular:      Rate and Rhythm: Normal rate and regular rhythm.   Pulmonary:      Effort: Pulmonary effort is normal.      Breath sounds: Normal breath sounds.   Musculoskeletal: Normal range of motion.   Skin:     General: Skin is warm and dry.      Capillary Refill: Capillary refill takes less than 2 seconds.   Neurological:      General: No focal deficit present.      Mental Status: She is alert and oriented to person, place, and time. Mental status is at baseline.   Psychiatric:         Mood and Affect: Mood normal.         Speech: Speech normal.         Thought Content: Thought content normal.         Judgment: Judgment normal.                 Assessment/Plan:     1. Post-viral cough syndrome    2. Sensation of chest tightness    Discussed with patient symptoms are viral in nature, there is low concern for any respiratory infection currently. Antibiotics are not advised at this time.  Pt already has an albuterol inhaler and a steroid taper Rx  I have advised pt to start steroid taper  Continue to use inhaler as directed  Start daily antihistamine (claritin or zyrtec)  Increase water intake  Please schedule follow up appt with PCP for next week if her symptoms do not improve. She will be fine to see PCP by next week given length of symptoms and negative COVID  Red flags discussed and when to RTC  Supportive care, differential diagnoses, and indications for immediate follow-up discussed with patient.    Pathogenesis of diagnosis discussed including typical length and natural progression.      Instructed to return to  or nearest emergency department if symptoms fail to improve, for any change in condition, further concerns, or new concerning symptoms.  Patient states understanding of the plan of  care and discharge instructions.

## 2020-04-06 ENCOUNTER — OFFICE VISIT (OUTPATIENT)
Dept: MEDICAL GROUP | Facility: PHYSICIAN GROUP | Age: 60
End: 2020-04-06
Payer: COMMERCIAL

## 2020-04-06 VITALS
DIASTOLIC BLOOD PRESSURE: 72 MMHG | WEIGHT: 106.8 LBS | TEMPERATURE: 99 F | BODY MASS INDEX: 20.97 KG/M2 | OXYGEN SATURATION: 96 % | HEIGHT: 60 IN | SYSTOLIC BLOOD PRESSURE: 106 MMHG | HEART RATE: 100 BPM | RESPIRATION RATE: 16 BRPM

## 2020-04-06 DIAGNOSIS — R07.89 CHEST TIGHTNESS: ICD-10-CM

## 2020-04-06 PROCEDURE — 99214 OFFICE O/P EST MOD 30 MIN: CPT | Performed by: FAMILY MEDICINE

## 2020-04-06 RX ORDER — AZITHROMYCIN 250 MG/1
TABLET, FILM COATED ORAL
Qty: 6 TAB | Refills: 0 | Status: SHIPPED | OUTPATIENT
Start: 2020-04-06 | End: 2020-04-29

## 2020-04-06 ASSESSMENT — FIBROSIS 4 INDEX: FIB4 SCORE: 1.23

## 2020-04-06 NOTE — PROGRESS NOTES
Subjective:     CC: chest tightness    HPI:   Mariela presents today with     Chest tightness  This is a new condition.  She had been seen on 3/22/2020 with what appeared to be an acute upper respiratory infection and bronchitis.  She was prescribed albuterol.  Initially albuterol seemed to help but after couple days and no longer seem to help her symptoms of chest tightness.  I then provided a Medrol Dosepak in case there is a reactive airway component to her symptoms.  She went to urgent care before starting that Medrol Dosepak.  During that urgent care visit she reported that she had tested negative for COVID-19 and they wanted her to use the steroids.  She then message me stating that the steroids helped for the first couple days but then the symptoms came right back. Currently she has chest tightness and a dry cough. No fevers/chills, rhinorrhea, congestion, facial pain, ear pain, sore throat. No wheezing and feels like breathing requires a bit more effort than normal but not necessarily shortness of breath. The albuterol will not completely resolve her symptoms but it does make it a little easier to breathe.      Past Medical History:   Diagnosis Date   • Anesthesia     severe ponv, hard to wake up   • Arthritis     shoulders   • Diverticulosis 2012    pt stated it was noted on colonoscopy   • Hypothyroid     hypothyroid   • Infectious disease     around bronchitis/flu 12/2011   • Lyme disease     chronic   • Pain 04/2018    right foot, bilateral shoulder   • Parkinsonism (HCC) 1/24/2018       Social History     Tobacco Use   • Smoking status: Never Smoker   • Smokeless tobacco: Never Used   Substance Use Topics   • Alcohol use: Yes     Comment: Occasionally   • Drug use: No       Current Outpatient Medications Ordered in Epic   Medication Sig Dispense Refill   • azithromycin (ZITHROMAX) 250 MG Tab 2 tabs by mouth on DAY 1 then 1 tab by mouth daily days 2-5. 6 Tab 0   • albuterol 108 (90 Base) MCG/ACT Aero Soln  inhalation aerosol Inhale 2 Puffs by mouth every 6 hours as needed for Shortness of Breath. 8.5 g 0   • cyanocobalamin (VITAMIN B-12) 100 MCG Tab Take 100 mcg by mouth every day.     • trihexyphenidyl (ARTANE) 2 MG Tab Take 1 Tab by mouth 3 times a day. 90 Tab 5   • levothyroxine (SYNTHROID) 88 MCG Tab Take 1 Tab by mouth Every morning on an empty stomach. 90 Tab 3   • Multiple Vitamin (MULTI VITAMIN PO) Take 1 Tab by mouth every day.       No current [facility]-ordered facility-administered medications on file.        Allergies:  Pcn [penicillins] and Percocet [oxycodone-acetaminophen]    Health Maintenance: Completed    ROS:  Gen: no fevers/chills  ENT: no sore throat  Pulm: no sob, + cough  CV: no chest pain    Objective:     Exam:  /72 (BP Location: Right arm, Patient Position: Sitting, BP Cuff Size: Adult)   Pulse 100   Temp 37.2 °C (99 °F) (Temporal)   Resp 16   Ht 1.524 m (5')   Wt 48.4 kg (106 lb 12.8 oz)   SpO2 96%   BMI 20.86 kg/m²  Body mass index is 20.86 kg/m².    Gen: Alert and oriented, No apparent distress.  Neck: Neck is supple without lymphadenopathy.  Lungs: Normal effort, CTA bilaterally, no wheezes, rhonchi, or rales  CV: Regular rate and rhythm. No murmurs, rubs, or gallops.  Ext: No clubbing, cyanosis, edema.    Assessment & Plan:     59 y.o. female with the following -     1. Chest tightness  This is a new condition.  She was seen in urgent care on 3/22/2020 with upper throat infection and bronchitis.  She prescribed albuterol which seems to be hit or miss with how successful it is not treating her symptoms of cough and chest tightness.  Does not completely resolve symptoms but will provide some minimal relief.  She was still having symptoms are provided a Medrol Dosepak in case there is reactive airway component to her symptoms.  She saw urgent care before starting the Dosepak and the at that time determine antibiotic was not necessary recommended she start Dosepak.  She thought it  was can help initially but it did not help her symptoms overall despite completing the Dosepak.  She denies any fever/chills, rhinorrhea, congestion, facial pain, ear pain, sore throat, and wheezing.  She denies shortness of breath but she feels like breathing requires a bit more effort than normal.  Her lungs are completely clear on exam today and her last dose of albuterol was yesterday.  She states that the morning when she first cough that would have acutely overnight is when her lungs feel the best but as the day progresses she will get the tightness and cough.  Since we have tried albuterol without much relief and a steroid Dosepak without much relief, I will try an antibiotic.  If the antibiotic fails, then I will send her to pulmonology to figure out what we're missing.  - Z-pack    Return if symptoms worsen or fail to improve.    Please note that this dictation was created using voice recognition software. I have made every reasonable attempt to correct obvious errors, but I expect that there are errors of grammar and possibly content that I did not discover before finalizing the note.

## 2020-04-06 NOTE — ASSESSMENT & PLAN NOTE
This is a new condition.  She had been seen on 3/22/2020 with what appeared to be an acute upper respiratory infection and bronchitis.  She was prescribed albuterol.  Initially albuterol seemed to help but after couple days and no longer seem to help her symptoms of chest tightness.  I then provided a Medrol Dosepak in case there is a reactive airway component to her symptoms.  She went to urgent care before starting that Medrol Dosepak.  During that urgent care visit she reported that she had tested negative for COVID-19 and they wanted her to use the steroids.  She then message me stating that the steroids helped for the first couple days but then the symptoms came right back. Currently she has chest tightness and a dry cough. No fevers/chills, rhinorrhea, congestion, facial pain, ear pain, sore throat. No wheezing and feels like breathing requires a bit more effort than normal but not necessarily shortness of breath. The albuterol will not completely resolve her symptoms but it does make it a little easier to breathe.

## 2020-04-20 ENCOUNTER — TELEPHONE (OUTPATIENT)
Dept: HEALTH INFORMATION MANAGEMENT | Facility: OTHER | Age: 60
End: 2020-04-20

## 2020-04-20 NOTE — TELEPHONE ENCOUNTER
1. Caller Name: Mariela Huff                  Call Back -Number: [phone]  [facility] PCP or Specialty Provider: Yes Dr. Bragg        2.  Does patient have any active symptoms of respiratory illness (fever OR cough OR shortness of breath OR sore throat)? Yes, the patient reports the following respiratory symptoms: cough and shortness of breath.    3.  Does patient have any comoribidities? None     4.  Has the patient traveled in the last 14 days OR had any known contact with someone who is suspected or confirmed to have COVID-19?  No.    5. Disposition: Cleared by RN Triage; OK to keep/schedule appointment       Cough and chest tightness for 1 month.  Note routed to [facility] Provider: FYI only.

## 2020-04-23 DIAGNOSIS — J40 BRONCHITIS: ICD-10-CM

## 2020-04-23 RX ORDER — ALBUTEROL SULFATE 90 UG/1
2 AEROSOL, METERED RESPIRATORY (INHALATION) EVERY 6 HOURS PRN
Qty: 8.5 G | Refills: 0 | Status: SHIPPED | OUTPATIENT
Start: 2020-04-23 | End: 2020-07-15 | Stop reason: SDUPTHER

## 2020-04-24 ENCOUNTER — OFFICE VISIT (OUTPATIENT)
Dept: PULMONOLOGY | Facility: HOSPICE | Age: 60
End: 2020-04-24
Payer: COMMERCIAL

## 2020-04-24 ENCOUNTER — HOSPITAL ENCOUNTER (OUTPATIENT)
Dept: LAB | Facility: MEDICAL CENTER | Age: 60
End: 2020-04-24
Attending: INTERNAL MEDICINE
Payer: COMMERCIAL

## 2020-04-24 VITALS
SYSTOLIC BLOOD PRESSURE: 116 MMHG | BODY MASS INDEX: 21.23 KG/M2 | OXYGEN SATURATION: 97 % | HEIGHT: 60 IN | WEIGHT: 108.13 LBS | DIASTOLIC BLOOD PRESSURE: 78 MMHG | HEART RATE: 88 BPM

## 2020-04-24 DIAGNOSIS — J98.9 REACTIVE AIRWAY DISEASE THAT IS NOT ASTHMA: ICD-10-CM

## 2020-04-24 DIAGNOSIS — R05.3 CHRONIC COUGH: ICD-10-CM

## 2020-04-24 DIAGNOSIS — G20.C PARKINSONISM, UNSPECIFIED PARKINSONISM TYPE (HCC): ICD-10-CM

## 2020-04-24 DIAGNOSIS — R06.09 EXERTIONAL DYSPNEA: ICD-10-CM

## 2020-04-24 DIAGNOSIS — J45.41 MODERATE PERSISTENT REACTIVE AIRWAY DISEASE WITH ACUTE EXACERBATION: ICD-10-CM

## 2020-04-24 PROCEDURE — 86615 BORDETELLA ANTIBODY: CPT

## 2020-04-24 PROCEDURE — 36415 COLL VENOUS BLD VENIPUNCTURE: CPT

## 2020-04-24 PROCEDURE — 99204 OFFICE O/P NEW MOD 45 MIN: CPT | Mod: 25 | Performed by: INTERNAL MEDICINE

## 2020-04-24 PROCEDURE — 94761 N-INVAS EAR/PLS OXIMETRY MLT: CPT | Performed by: INTERNAL MEDICINE

## 2020-04-24 RX ORDER — BUDESONIDE AND FORMOTEROL FUMARATE DIHYDRATE 160; 4.5 UG/1; UG/1
2 AEROSOL RESPIRATORY (INHALATION) 2 TIMES DAILY
Qty: 1 INHALER | Refills: 3 | Status: SHIPPED | OUTPATIENT
Start: 2020-04-24 | End: 2020-06-05

## 2020-04-24 RX ORDER — BUDESONIDE AND FORMOTEROL FUMARATE DIHYDRATE 160; 4.5 UG/1; UG/1
2 AEROSOL RESPIRATORY (INHALATION) 2 TIMES DAILY
Qty: 1 INHALER | Refills: 0 | Status: SHIPPED | OUTPATIENT
Start: 2020-04-24 | End: 2020-04-24

## 2020-04-24 ASSESSMENT — FIBROSIS 4 INDEX: FIB4 SCORE: 1.23

## 2020-04-24 NOTE — PROGRESS NOTES
Pulmonary Clinic- Initial Consult    Date of Service: 4/24/2020    Referring Physician: Abi Bragg M.D.    Reason for Consult: Cough, chest tightness    Chief Complaint:   Chief Complaint   Patient presents with   • Establish Care     Referred by Dr Davila for Chest Tightness/cough   • Other     CXR 03/22/20     HPI:   Mariela Huff is a very pleasant 59 y.o. female never smoker who is followed by Abi Bragg M.D.  and is referred to the pulmonary clinic for cough and chest tightness. Patient has not been seen by pulmonary in the past, no prior PFTs.     About one month ago, Mariela went to urgent care for subacute onset of chest tightness started on 3/16/2020 which was attributed to an acute upper respiratory tract infection and bronchitis.  She was prescribed albuterol which initially helped however symptoms have since persisted.  She was then prescribed a Medrol Dosepak which again helped for several days but then the symptoms came right back.  She was then prescribed a Z-Tom by her PCP which also helped, but now the symptoms have returned. She has been using albuterol inhaler multiple times a day with minimal relief.    Currently her primary symptoms are chest tightness she describes as a midsternal ache.  This is associated with a dry cough frequent throat clearing, chronic postnasal drip and occasional dizziness.  She reports shortness of breath with gardening.  She denies any palpitations, chest pain, fevers, wheezing or sore throat.  She feels she got the most relief with azithromycin.  Prior to the onset of her symptoms she was very active, frequently biking and enjoys golfing.      She tested negative for COVID-19 on 3/28.    CXR 3/22/2020 personally reviewed, agree with the radiology interpretation that there is no significant radiographic cardiopulmonary pathology.    She has a past medical history of chronic Lyme disease in 2014, as well as babesiosis, treated with over a year  of antibiotics.  She also has a history of shoulder arthritis, diverticulosis, hypothyroidism on synthroid, and parkinsonism on Artane.    Past Medical History:   Diagnosis Date   • Anesthesia     severe ponv, hard to wake up   • Arthritis     shoulders   • Chest tightness    • Cough    • Diverticulosis 2012    pt stated it was noted on colonoscopy   • Hypothyroid     hypothyroid   • Infectious disease     around bronchitis/flu 12/2011   • Lyme disease     chronic   • Pain 04/2018    right foot, bilateral shoulder   • Painful breathing    • Parkinsonism (HCC) 1/24/2018   • Shortness of breath        Past Surgical History:   Procedure Laterality Date   • TOE FUSION Right 5/7/2018    Procedure: TOE FUSION - 1ST MTP;  Surgeon: Janes Benavidez M.D.;  Location: SURGERY SAME DAY [facility];  Service: Orthopedics   • FLEXOR TENDON REPAIR Right 5/7/2018    Procedure: FLEXOR TENDON REPAIR - 4TH RELEASE W/SOFT TISSUE RELEASE 2/3;  Surgeon: Janes Benavidez M.D.;  Location: SURGERY SAME DAY [facility];  Service: Orthopedics   • HARDWARE REMOVAL ORTHO Right 5/7/2018    Procedure: HARDWARE REMOVAL ORTHO - INTERNAL FIXATION;  Surgeon: Janes Benavidez M.D.;  Location: SURGERY SAME DAY [facility];  Service: Orthopedics   • ORTHOPEDIC OSTEOTOMY Right 5/7/2018    Procedure: ORTHOPEDIC OSTEOTOMY - DISTAL METATARSAL 2/3/4;  Surgeon: Janes Benavidez M.D.;  Location: SURGERY SAME DAY [facility];  Service: Orthopedics   • BUNIONECTOMY Right 5/1/2017    Procedure: BUNIONECTOMY - PROXIMAL HALLUX VALGUS CORRECTION W/BONE GRAFT;  Surgeon: Janes Benavidez M.D.;  Location: SURGERY [facility];  Service:    • NEUROMA EXCISION  5/1/2017    Procedure: NEUROMA EXCISION - 2ND WEBSPACE;  Surgeon: Janes Benavidez M.D.;  Location: SURGERY [facility];  Service:    • BREAST IMPLANT REVISION Bilateral 4/11/2016    Procedure: BREAST IMPLANT EXCHANGE OF SALINE TO SILICONE W/REPOSITIONING OF LATERAL FOLDS;  Surgeon: Mikey Adkins,  M.D.;  Location: SURGERY [facility];  Service:    • BREAST BIOPSY  1/24/2012    Performed by SANDRA ESTRADA at SURGERY SAME DAY [facility] ORS   • SHOULDER ARTHROSCOPY  12/1/2010    Performed by KATE CHANCE at SURGERY [facility] ORS   • SHOULDER DECOMPRESSION  12/1/2010    Performed by KATE CHANCE at SURGERY [facility] ORS   • ROTATOR CUFF REPAIR  12/1/2010    Performed by KATE CHANCE at SURGERY [facility] ORS   • SHOULDER DECOMPRESSION ARTHROSCOPIC  5/13/2009    Performed by KATE CHANCE at SURGERY [facility] ORS   • ROTATOR CUFF REPAIR  5/13/2009    Performed by KATE CHANCE at SURGERY [facility] ORS   • HYSTERECTOMY, VAGINAL  1999    fibroids   • PB ENLARGE BREAST WITH IMPLANT Bilateral 1999   • OTHER ORTHOPEDIC SURGERY  1993    joint repair left thumb   • US-NEEDLE CORE BX-BREAST PANEL         Social History     Socioeconomic History   • Marital status:      Spouse name: Not on file   • Number of children: Not on file   • Years of education: Not on file   • Highest education level: Not on file   Occupational History   • Not on file   Social Needs   • Financial resource strain: Not on file   • Food insecurity     Worry: Not on file     Inability: Not on file   • Transportation needs     Medical: Not on file     Non-medical: Not on file   Tobacco Use   • Smoking status: Never Smoker   • Smokeless tobacco: Never Used   Substance and Sexual Activity   • Alcohol use: Yes     Comment: Occasionally   • Drug use: No   • Sexual activity: Yes     Partners: Male   Lifestyle   • Physical activity     Days per week: Not on file     Minutes per session: Not on file   • Stress: Not on file   Relationships   • Social connections     Talks on phone: Not on file     Gets together: Not on file     Attends [employer] service: Not on file     Active member of club or organization: Not on file     Attends meetings of clubs or organizations: Not on file     Relationship status: Not on file   •  Intimate partner violence     Fear of current or ex partner: Not on file     Emotionally abused: Not on file     Physically abused: Not on file     Forced sexual activity: Not on file   Other Topics Concern   • Not on file   Social History Narrative   • Not on file          Family History   Problem Relation Age of Onset   • Cancer Paternal Aunt         breast   • Diabetes Mother    • Thyroid Mother    • Diabetes Father    • Thyroid Daughter    • Thyroid Sister    • Cancer Paternal Aunt         breast, uterine   • Cancer Paternal Aunt         bladder   • Cancer Paternal Aunt         colon   • Heart Disease Neg Hx    • Stroke Neg Hx    • Alcohol/Drug Neg Hx        Current Outpatient Medications on File Prior to Visit   Medication Sig Dispense Refill   • albuterol 108 (90 Base) MCG/ACT Aero Soln inhalation aerosol Inhale 2 Puffs by mouth every 6 hours as needed for Shortness of Breath. 8.5 g 0   • cyanocobalamin (VITAMIN B-12) 100 MCG Tab Take 100 mcg by mouth every day.     • trihexyphenidyl (ARTANE) 2 MG Tab Take 1 Tab by mouth 3 times a day. 90 Tab 5   • levothyroxine (SYNTHROID) 88 MCG Tab Take 1 Tab by mouth Every morning on an empty stomach. 90 Tab 3   • Multiple Vitamin (MULTI VITAMIN PO) Take 1 Tab by mouth every day.     • azithromycin (ZITHROMAX) 250 MG Tab 2 tabs by mouth on DAY 1 then 1 tab by mouth daily days 2-5. (Patient not taking: Reported on 4/24/2020) 6 Tab 0     No current facility-administered medications on file prior to visit.        Allergies: Pcn [penicillins] and Percocet [oxycodone-acetaminophen]      ROS:   Review of Systems   Constitutional: Positive for malaise/fatigue. Negative for chills, fever and weight loss.   HENT: Negative for congestion, sinus pain and sore throat.    Eyes: Negative for pain and discharge.   Respiratory: Positive for cough (dry) and shortness of breath. Negative for hemoptysis, sputum production, wheezing and stridor.    Cardiovascular: Positive for chest pain  (tightness). Negative for palpitations, orthopnea and leg swelling.   Gastrointestinal: Negative for abdominal pain, diarrhea, nausea and vomiting.   Genitourinary: Negative for dysuria, frequency and urgency.   Musculoskeletal: Negative for joint pain and myalgias.   Skin: Negative for rash.   Neurological: Negative for dizziness, sensory change, focal weakness, loss of consciousness and headaches.   Psychiatric/Behavioral: Negative.    All other systems reviewed and are negative.    Vitals:  /78 (BP Location: Left arm, Patient Position: Sitting, BP Cuff Size: Adult)   Pulse 88   Ht 1.524 m (5')   Wt 49 kg (108 lb 2 oz)   SpO2 97%     Physical Exam:  Physical Exam  Vitals signs and nursing note reviewed.   Constitutional:       General: She is not in acute distress.     Appearance: Normal appearance. She is well-developed and normal weight. She is not ill-appearing or diaphoretic.      Comments: Pleasant, thin   HENT:      Head: Normocephalic.   Eyes:      General: No scleral icterus.        Right eye: No discharge.         Left eye: No discharge.      Conjunctiva/sclera: Conjunctivae normal.      Pupils: Pupils are equal, round, and reactive to light.   Neck:      Thyroid: No thyromegaly.      Vascular: No JVD.   Cardiovascular:      Rate and Rhythm: Normal rate and regular rhythm.      Heart sounds: Normal heart sounds. No murmur. No gallop.    Pulmonary:      Effort: Pulmonary effort is normal. No respiratory distress.      Breath sounds: Normal breath sounds. No wheezing or rales.   Abdominal:      General: There is no distension.      Palpations: Abdomen is soft.      Tenderness: There is no abdominal tenderness. There is no guarding.   Musculoskeletal:         General: No tenderness.   Lymphadenopathy:      Cervical: No cervical adenopathy.   Skin:     General: Skin is warm.      Capillary Refill: Capillary refill takes less than 2 seconds.      Findings: No erythema or rash.   Neurological:       General: No focal deficit present.      Mental Status: She is alert and oriented to person, place, and time.      Cranial Nerves: No cranial nerve deficit.      Sensory: No sensory deficit.      Motor: No abnormal muscle tone.      Comments: Resting tremor   Psychiatric:         Behavior: Behavior normal.         Thought Content: Thought content normal.      Comments: Pleasant, anxious       Laboratory Data:    PFTs as reviewed by me personally show: Spirometry in clinic today showing no abnormalities    Imaging as reviewed by me personally show:  CXR 3/22/2020 personally reviewed, agree with the radiology interpretation that there is no significant radiographic cardiopulmonary pathology.    Assessment/Plan:    Problem List Items Addressed This Visit     Reactive airway disease that is not asthma     Protracted recovery from recent URI; no desaturations on multi ox on room air in clinic today, CXR unremarkable, and spirometry in clinic showing no significant obstruction    --Trial Symbicort 160, instructed on use and to rinse mouth after initiation  --Check pertussis serologies  --Complete PFTs testing resumes after pandemic  --As symptoms atypical, have ordered for ECG, TTE (?  Post-viral effusion)         Exertional dyspnea    Relevant Orders    PULMONARY FUNCTION TESTS -Test requested: Spirometry with-out & with Bronchodilator    Multiple Oximetry    EKG - Cardiology Performed    EC-ECHOCARDIOGRAM COMPLETE W/O CONT    B PERTUSSIS IGG/IGM AB QT    Parkinsonism (HCC)     On Artane           Other Visit Diagnoses     Chronic cough        Relevant Orders    B PERTUSSIS IGG/IGM AB QT    Moderate persistent reactive airway disease with acute exacerbation        Relevant Medications    budesonide-formoterol (SYMBICORT) 160-4.5 MCG/ACT Aerosol         Return in about 4 weeks (around 5/22/2020).     This note was generated using voice recognition software which has a chance of producing errors of grammar and possibly  content.  I have made every reasonable attempt to find and correct any obvious errors, but it should be expected that some may not be found prior to finalization of this note.  __________  Chester Taylor MD  Pulmonary and Critical Care Medicine  [facility]

## 2020-04-24 NOTE — PROCEDURES
Multi-Ox Readings  Multi Ox #1 Room air   O2 sat % at rest 97   O2 sat % on exertion 94   O2 sat average on exertion     Multi Ox #2     O2 sat % at rest     O2 sat % on exertion     O2 sat average on exertion       Oxygen Use     Oxygen Frequency     Duration of need     Is the patient mobile within the home?     CPAP Use?     BIPAP Use?     Servo Titration

## 2020-04-25 PROBLEM — J98.9 REACTIVE AIRWAY DISEASE THAT IS NOT ASTHMA: Status: ACTIVE | Noted: 2020-04-25

## 2020-04-25 PROBLEM — R06.09 EXERTIONAL DYSPNEA: Status: ACTIVE | Noted: 2020-04-25

## 2020-04-25 ASSESSMENT — ENCOUNTER SYMPTOMS
MYALGIAS: 0
CHILLS: 0
DIARRHEA: 0
WHEEZING: 0
ABDOMINAL PAIN: 0
WEIGHT LOSS: 0
SORE THROAT: 0
HEADACHES: 0
DIZZINESS: 0
SINUS PAIN: 0
EYE PAIN: 0
PSYCHIATRIC NEGATIVE: 1
STRIDOR: 0
ORTHOPNEA: 0
FEVER: 0
FOCAL WEAKNESS: 0
SPUTUM PRODUCTION: 0
EYE DISCHARGE: 0
NAUSEA: 0
COUGH: 1
VOMITING: 0
SHORTNESS OF BREATH: 1
LOSS OF CONSCIOUSNESS: 0
HEMOPTYSIS: 0
PALPITATIONS: 0
SENSORY CHANGE: 0

## 2020-04-25 NOTE — ASSESSMENT & PLAN NOTE
Protracted recovery from recent URI; no desaturations on multi ox on room air in clinic today, CXR unremarkable, and spirometry in clinic showing no significant obstruction    --Trial Symbicort 160, instructed on use and to rinse mouth after initiation  --Check pertussis serologies  --Complete PFTs testing resumes after pandemic  --As symptoms atypical, have ordered for ECG, TTE (?  Post-viral effusion)

## 2020-04-28 LAB
B PERT AB SPEC QL: POSITIVE
B PERT FHA IGG SER QL: POSITIVE
B PERT FHA IGM SER QL: ABNORMAL
B PERT IGG SER IA-ACNC: 4.79 IV
B PERT IGG SER QL IB: POSITIVE
B PERT IGG SER QL: ABNORMAL
B PERT IGM SER IA-ACNC: 1.4 IV
B PERT IGM SER QL: ABNORMAL
B PERT.PT IGM SER-ACNC: NEGATIVE

## 2020-04-29 ENCOUNTER — HOSPITAL ENCOUNTER (EMERGENCY)
Facility: MEDICAL CENTER | Age: 60
End: 2020-04-29
Attending: EMERGENCY MEDICINE
Payer: COMMERCIAL

## 2020-04-29 ENCOUNTER — APPOINTMENT (OUTPATIENT)
Dept: RADIOLOGY | Facility: MEDICAL CENTER | Age: 60
End: 2020-04-29
Attending: EMERGENCY MEDICINE
Payer: COMMERCIAL

## 2020-04-29 VITALS
HEIGHT: 60 IN | DIASTOLIC BLOOD PRESSURE: 65 MMHG | WEIGHT: 106 LBS | RESPIRATION RATE: 19 BRPM | BODY MASS INDEX: 20.81 KG/M2 | SYSTOLIC BLOOD PRESSURE: 106 MMHG | HEART RATE: 80 BPM | TEMPERATURE: 98 F | OXYGEN SATURATION: 93 %

## 2020-04-29 DIAGNOSIS — R06.02 SOB (SHORTNESS OF BREATH): ICD-10-CM

## 2020-04-29 LAB
ALBUMIN SERPL BCP-MCNC: 4.5 G/DL (ref 3.2–4.9)
ALBUMIN/GLOB SERPL: 1.5 G/DL
ALP SERPL-CCNC: 96 U/L (ref 30–99)
ALT SERPL-CCNC: 22 U/L (ref 2–50)
ANION GAP SERPL CALC-SCNC: 11 MMOL/L (ref 7–16)
AST SERPL-CCNC: 23 U/L (ref 12–45)
BASOPHILS # BLD AUTO: 0.8 % (ref 0–1.8)
BASOPHILS # BLD: 0.05 K/UL (ref 0–0.12)
BILIRUB SERPL-MCNC: 0.4 MG/DL (ref 0.1–1.5)
BUN SERPL-MCNC: 16 MG/DL (ref 8–22)
CALCIUM SERPL-MCNC: 9.9 MG/DL (ref 8.5–10.5)
CHLORIDE SERPL-SCNC: 99 MMOL/L (ref 96–112)
CO2 SERPL-SCNC: 28 MMOL/L (ref 20–33)
COVID ORDER STATUS COVID19: NORMAL
CREAT SERPL-MCNC: 0.83 MG/DL (ref 0.5–1.4)
EKG IMPRESSION: NORMAL
EOSINOPHIL # BLD AUTO: 0.13 K/UL (ref 0–0.51)
EOSINOPHIL NFR BLD: 2.2 % (ref 0–6.9)
ERYTHROCYTE [DISTWIDTH] IN BLOOD BY AUTOMATED COUNT: 38.5 FL (ref 35.9–50)
GLOBULIN SER CALC-MCNC: 3 G/DL (ref 1.9–3.5)
GLUCOSE SERPL-MCNC: 91 MG/DL (ref 65–99)
HCT VFR BLD AUTO: 46 % (ref 37–47)
HGB BLD-MCNC: 16 G/DL (ref 12–16)
IMM GRANULOCYTES # BLD AUTO: 0.03 K/UL (ref 0–0.11)
IMM GRANULOCYTES NFR BLD AUTO: 0.5 % (ref 0–0.9)
LYMPHOCYTES # BLD AUTO: 1.95 K/UL (ref 1–4.8)
LYMPHOCYTES NFR BLD: 32.3 % (ref 22–41)
MCH RBC QN AUTO: 31.8 PG (ref 27–33)
MCHC RBC AUTO-ENTMCNC: 34.8 G/DL (ref 33.6–35)
MCV RBC AUTO: 91.5 FL (ref 81.4–97.8)
MONOCYTES # BLD AUTO: 0.46 K/UL (ref 0–0.85)
MONOCYTES NFR BLD AUTO: 7.6 % (ref 0–13.4)
NEUTROPHILS # BLD AUTO: 3.42 K/UL (ref 2–7.15)
NEUTROPHILS NFR BLD: 56.6 % (ref 44–72)
NRBC # BLD AUTO: 0 K/UL
NRBC BLD-RTO: 0 /100 WBC
PLATELET # BLD AUTO: 238 K/UL (ref 164–446)
PMV BLD AUTO: 9.7 FL (ref 9–12.9)
POTASSIUM SERPL-SCNC: 3.8 MMOL/L (ref 3.6–5.5)
PROT SERPL-MCNC: 7.5 G/DL (ref 6–8.2)
RBC # BLD AUTO: 5.03 M/UL (ref 4.2–5.4)
SODIUM SERPL-SCNC: 138 MMOL/L (ref 135–145)
WBC # BLD AUTO: 6 K/UL (ref 4.8–10.8)

## 2020-04-29 PROCEDURE — 80053 COMPREHEN METABOLIC PANEL: CPT

## 2020-04-29 PROCEDURE — 700117 HCHG RX CONTRAST REV CODE 255: Performed by: EMERGENCY MEDICINE

## 2020-04-29 PROCEDURE — 85025 COMPLETE CBC W/AUTO DIFF WBC: CPT

## 2020-04-29 PROCEDURE — G2023 SPECIMEN COLLECT COVID-19: HCPCS | Performed by: EMERGENCY MEDICINE

## 2020-04-29 PROCEDURE — 99284 EMERGENCY DEPT VISIT MOD MDM: CPT

## 2020-04-29 PROCEDURE — 93005 ELECTROCARDIOGRAM TRACING: CPT | Performed by: EMERGENCY MEDICINE

## 2020-04-29 PROCEDURE — 71045 X-RAY EXAM CHEST 1 VIEW: CPT

## 2020-04-29 PROCEDURE — 71275 CT ANGIOGRAPHY CHEST: CPT

## 2020-04-29 RX ADMIN — IOHEXOL 33 ML: 350 INJECTION, SOLUTION INTRAVENOUS at 11:02

## 2020-04-29 ASSESSMENT — FIBROSIS 4 INDEX: FIB4 SCORE: 1.23

## 2020-04-29 NOTE — ED PROVIDER NOTES
ED Provider Note    ER PROVIDER NOTE        CHIEF COMPLAINT  Chief Complaint   Patient presents with   • Shortness of Breath       HPI  Mariela Huff is a 59 y.o. female who presents to the emergency department complaining of intermittent cough and shortness of breath.  Patient reports he has had symptoms essentially over the last 6 weeks that seem to come and go.  Does not seem to be a pattern to it, exertional positional or otherwise she seen her primary care, as well as pulmonology, and while symptoms do seem to occasionally improve they did return and seemed worse this morning.  She denies any fevers or chills.  No chest pain.  No leg pain or swelling.  She denies any recent travel or known high risk contacts.  No abdominal pain, nausea or vomiting    Patient has been on steroid as well as antibiotic, steroid antibiotic seem to improve things for little while    Has had negative COVID testing last month    REVIEW OF SYSTEMS  Pertinent positives include shortness of breath, cough. Pertinent negatives include no chest pain. See HPI for details. All other systems reviewed and are negative.    PAST MEDICAL HISTORY   has a past medical history of Anesthesia, Arthritis, Chest tightness, Cough, Diverticulosis (2012), Hypothyroid, Infectious disease, Lyme disease, Pain (04/2018), Painful breathing, Parkinsonism (HCC) (1/24/2018), and Shortness of breath.    SURGICAL HISTORY   has a past surgical history that includes hysterectomy, vaginal (1999); shoulder decompression arthroscopic (5/13/2009); rotator cuff repair (5/13/2009); other orthopedic surgery (1993); shoulder arthroscopy (12/1/2010); shoulder decompression (12/1/2010); rotator cuff repair (12/1/2010); breast biopsy (1/24/2012); enlarge breast with implant (Bilateral, 1999); us-needle core bx-breast panel; breast implant revision (Bilateral, 4/11/2016); bunionectomy (Right, 5/1/2017); neuroma excision (5/1/2017); toe fusion (Right, 5/7/2018); flexor  tendon repair (Right, 5/7/2018); hardware removal ortho (Right, 5/7/2018); and orthopedic osteotomy (Right, 5/7/2018).    FAMILY HISTORY  Family History   Problem Relation Age of Onset   • Cancer Paternal Aunt         breast   • Diabetes Mother    • Thyroid Mother    • Diabetes Father    • Thyroid Daughter    • Thyroid Sister    • Cancer Paternal Aunt         breast, uterine   • Cancer Paternal Aunt         bladder   • Cancer Paternal Aunt         colon   • Heart Disease Neg Hx    • Stroke Neg Hx    • Alcohol/Drug Neg Hx        SOCIAL HISTORY  Social History     Socioeconomic History   • Marital status:      Spouse name: Not on file   • Number of children: Not on file   • Years of education: Not on file   • Highest education level: Not on file   Occupational History   • Not on file   Social Needs   • Financial resource strain: Not on file   • Food insecurity     Worry: Not on file     Inability: Not on file   • Transportation needs     Medical: Not on file     Non-medical: Not on file   Tobacco Use   • Smoking status: Never Smoker   • Smokeless tobacco: Never Used   Substance and Sexual Activity   • Alcohol use: Yes     Comment: Occasionally   • Drug use: No   • Sexual activity: Yes     Partners: Male   Lifestyle   • Physical activity     Days per week: Not on file     Minutes per session: Not on file   • Stress: Not on file   Relationships   • Social connections     Talks on phone: Not on file     Gets together: Not on file     Attends Spiritism service: Not on file     Active member of club or organization: Not on file     Attends meetings of clubs or organizations: Not on file     Relationship status: Not on file   • Intimate partner violence     Fear of current or ex partner: Not on file     Emotionally abused: Not on file     Physically abused: Not on file     Forced sexual activity: Not on file   Other Topics Concern   • Not on file   Social History Narrative   • Not on file      Social History      Substance and Sexual Activity   Drug Use No       CURRENT MEDICATIONS  Home Medications     Reviewed by Anish Mulligan (Pharmacy Tech) on 04/29/20 at 1037  Med List Status: Complete   Medication Last Dose Status   albuterol 108 (90 Base) MCG/ACT Aero Soln inhalation aerosol UNK Active   budesonide-formoterol (SYMBICORT) 160-4.5 MCG/ACT Aerosol UNK Active   levothyroxine (SYNTHROID) 88 MCG Tab UNK Active   trihexyphenidyl (ARTANE) 2 MG Tab UNK Active                ALLERGIES  Allergies   Allergen Reactions   • Pcn [Penicillins] Rash     Rash     • Percocet [Oxycodone-Acetaminophen] Vomiting   • Symbicort [Budesonide-Formoterol Fumarate]        PHYSICAL EXAM  VITAL SIGNS: /65   Pulse 80   Temp 36.7 °C (98 °F) (Temporal)   Resp 19   Ht 1.524 m (5')   Wt 48.1 kg (106 lb)   SpO2 93%   BMI 20.70 kg/m²   Pulse ox interpretation: I interpret this pulse ox as normal.    Constitutional: Alert in no apparent distress.  HENT: No signs of trauma, Bilateral external ears normal, Nose normal.   Eyes: Pupils are equal and reactive, Conjunctiva normal, Non-icteric.   Neck: Normal range of motion, No tenderness, Supple, No stridor.   Lymphatic: No lymphadenopathy noted.   Cardiovascular: Regular rate and rhythm, no murmurs.   Thorax & Lungs: Normal breath sounds, No respiratory distress, No wheezing, No chest tenderness.   Abdomen: Bowel sounds normal, Soft, No tenderness, No masses, No pulsatile masses. No peritoneal signs.  Skin: Warm, Dry, No erythema, No rash.   Back: No bony tenderness, No CVA tenderness.   Extremities: Intact distal pulses, No edema, No tenderness, No cyanosis, Negative Chandler's sign.  Musculoskeletal: Good range of motion in all major joints. No tenderness to palpation or major deformities noted.   Neurologic: Alert , Normal motor function, Normal sensory function, No focal deficits noted.   Psychiatric: Affect normal, Judgment normal, Mood normal.     DIAGNOSTIC STUDIES /  PROCEDURES    Results for orders placed or performed during the hospital encounter of 04/29/20   CBC WITH DIFFERENTIAL   Result Value Ref Range    WBC 6.0 4.8 - 10.8 K/uL    RBC 5.03 4.20 - 5.40 M/uL    Hemoglobin 16.0 12.0 - 16.0 g/dL    Hematocrit 46.0 37.0 - 47.0 %    MCV 91.5 81.4 - 97.8 fL    MCH 31.8 27.0 - 33.0 pg    MCHC 34.8 33.6 - 35.0 g/dL    RDW 38.5 35.9 - 50.0 fL    Platelet Count 238 164 - 446 K/uL    MPV 9.7 9.0 - 12.9 fL    Neutrophils-Polys 56.60 44.00 - 72.00 %    Lymphocytes 32.30 22.00 - 41.00 %    Monocytes 7.60 0.00 - 13.40 %    Eosinophils 2.20 0.00 - 6.90 %    Basophils 0.80 0.00 - 1.80 %    Immature Granulocytes 0.50 0.00 - 0.90 %    Nucleated RBC 0.00 /100 WBC    Neutrophils (Absolute) 3.42 2.00 - 7.15 K/uL    Lymphs (Absolute) 1.95 1.00 - 4.80 K/uL    Monos (Absolute) 0.46 0.00 - 0.85 K/uL    Eos (Absolute) 0.13 0.00 - 0.51 K/uL    Baso (Absolute) 0.05 0.00 - 0.12 K/uL    Immature Granulocytes (abs) 0.03 0.00 - 0.11 K/uL    NRBC (Absolute) 0.00 K/uL   COMP METABOLIC PANEL   Result Value Ref Range    Sodium 138 135 - 145 mmol/L    Potassium 3.8 3.6 - 5.5 mmol/L    Chloride 99 96 - 112 mmol/L    Co2 28 20 - 33 mmol/L    Anion Gap 11.0 7.0 - 16.0    Glucose 91 65 - 99 mg/dL    Bun 16 8 - 22 mg/dL    Creatinine 0.83 0.50 - 1.40 mg/dL    Calcium 9.9 8.5 - 10.5 mg/dL    AST(SGOT) 23 12 - 45 U/L    ALT(SGPT) 22 2 - 50 U/L    Alkaline Phosphatase 96 30 - 99 U/L    Total Bilirubin 0.4 0.1 - 1.5 mg/dL    Albumin 4.5 3.2 - 4.9 g/dL    Total Protein 7.5 6.0 - 8.2 g/dL    Globulin 3.0 1.9 - 3.5 g/dL    A-G Ratio 1.5 g/dL   COVID/SARS CoV-2   Result Value Ref Range    COVID Order Status Received    ESTIMATED GFR   Result Value Ref Range    GFR If African American >60 >60 mL/min/1.73 m 2    GFR If Non African American >60 >60 mL/min/1.73 m 2   EKG (NOW)   Result Value Ref Range    Report       [facility] Emergency Dept[facility]    Test Date:  2020-04-29  Pt Name:    ROBBY COAETS              Department: ER  MRN:        [id number]                      Room:       [facility]  Gender:     Female                       Technician: FOREIGN  :        1960                   Requested By:CORWIN SANDS  Order #:    [id number]                    Reading MD: CORWIN SANDS MD    Measurements  Intervals                                Axis  Rate:       116                          P:  VA:                                      QRS:        -53  QRSD:       152                          T:          48  QT:         340  QTc:        473    Interpretive Statements  Sinus rhythm with poor baseline due to tremor, no ischemia  Electronically Signed On 2020 12:30:11 PDT by CORWIN SANDS MD           RADIOLOGY  CT-CTA CHEST PULMONARY ARTERY W/ RECONS   Final Result      1.  No pulmonary embolus. No acute abnormality in the chest.   2.  Nonobstructing left nephrolithiasis.   3.  Colonic diverticulosis.         DX-CHEST-PORTABLE (1 VIEW)   Final Result      No acute cardiopulmonary abnormality.        The radiologist's interpretation of all radiological studies have been reviewed by me.    COURSE & MEDICAL DECISION MAKING  Nursing notes, VS, PMSFHx reviewed in chart.    9:45 AM Patient seen and examined at bedside.. Ordered for CBC, CMP, x-ray, ECG,covid, CTA to evaluate her symptoms.     Patient reevaluated, updated on all results, will plan for discharge    This patient was cared for during the COVID-19 pandemic.  History and physical exam may be limited/truncated by the inherent challenges of PPE and the need to decrease staff exposure to novel coronavirus.  Some aspects of disease management may be different to protect staff and help slow the spread of disease. I verified that, if possible, the patient was wearing a mask and I was wearing appropriate PPE every time I encountered the patient.     Current [facility] COVID19 protocol followed      Decision Making:  This is a 59 y.o. female presented with intermittent  shortness of breath.  At this point there does not appear to be any emergent pathology.  CTA was obtained there is no evidence of pulmonary embolism, no findings suggestive of pulmonary infectious process either.  Patient has no chest pains or ischemic findings on her ECG.  No anemia.  Patient did have outpatient COVID testing that was negative a few weeks ago, this was repeated and results are pending although I think likely low risk.  She will self isolate.  Additionally reviewed patient's pertussis testing, and at this point whether this is a result of her immunization or recent infection is not clear from the testing.  She is already been treated with azithromycin so would not  anyway    The patient will return for new or worsening symptoms and is stable at the time of discharge.    The patient is referred to a primary physician for blood pressure management, diabetic screening, and for all other preventative health concerns.      DISPOSITION:  Patient will be discharged home in stable condition.    FOLLOW UP:  Abi Bragg M.D.  [address]  [place] [address] [phone]    Call       Chester Taylor M.D.  [address] [facility] [address]  [place] [id number] [phone]    Call         OUTPATIENT MEDICATIONS:  New Prescriptions    No medications on file         FINAL IMPRESSION  1. SOB (shortness of breath)         The note accurately reflects work and decisions made by me.  Bossman Chavez M.D.  4/29/2020  12:32 PM

## 2020-04-29 NOTE — ED TRIAGE NOTES
Chief Complaint   Patient presents with   • Shortness of Breath     Pt has had non productive cough with SOB since March 19, chest tightness, fatigue.  Pt has seen PCP and pulmonology.  Pt states continues to be SOB, used albuterol inhaler this am.  Pt states previous covid test was negative on March 28.  Pt speaking in complete sentences, no increased work of breathing.

## 2020-04-29 NOTE — ED NOTES
Pt remains resting in bed at this time.  No needs verbalized.  Will continue to monitor.  Breathing even and unlabored.

## 2020-05-01 LAB
SARS-COV-2 RNA RESP QL NAA+PROBE: NOT DETECTED
SPECIMEN SOURCE: NORMAL

## 2020-05-02 NOTE — ED NOTES
COVID-19 Test Follow Up  05/01/20      Patient tested negative for COVID-19. I have informed the patient of the result by My Chart Message. Encouraged to stay at home until no fever for 72 hours without the use of fever reducing medications and symptoms improving. Informed there is no need for further self-isolate for 14 days for COVID-19 unless otherwise directed by the Health Dept.     She is advised to return to the ER for worsening symptoms including difficulty breathing, ongoing fever, weakness or chest pain.    Joaquina Real, PharmD

## 2020-05-06 ENCOUNTER — HOSPITAL ENCOUNTER (OUTPATIENT)
Dept: CARDIOLOGY | Facility: MEDICAL CENTER | Age: 60
End: 2020-05-06
Attending: INTERNAL MEDICINE
Payer: COMMERCIAL

## 2020-05-06 DIAGNOSIS — R06.09 EXERTIONAL DYSPNEA: ICD-10-CM

## 2020-05-06 LAB
LV EJECT FRACT  99904: 65
LV EJECT FRACT MOD 2C 99903: 73.12
LV EJECT FRACT MOD 4C 99902: 61.79
LV EJECT FRACT MOD BP 99901: 68.52

## 2020-05-06 PROCEDURE — 93306 TTE W/DOPPLER COMPLETE: CPT

## 2020-05-06 PROCEDURE — 93306 TTE W/DOPPLER COMPLETE: CPT | Mod: 26 | Performed by: INTERNAL MEDICINE

## 2020-05-19 ENCOUNTER — TELEPHONE (OUTPATIENT)
Dept: PULMONOLOGY | Facility: HOSPICE | Age: 60
End: 2020-05-19

## 2020-05-19 ENCOUNTER — NON-PROVIDER VISIT (OUTPATIENT)
Dept: PULMONOLOGY | Facility: HOSPICE | Age: 60
End: 2020-05-19
Attending: INTERNAL MEDICINE
Payer: COMMERCIAL

## 2020-05-19 ENCOUNTER — OFFICE VISIT (OUTPATIENT)
Dept: PULMONOLOGY | Facility: HOSPICE | Age: 60
End: 2020-05-19
Payer: COMMERCIAL

## 2020-05-19 VITALS
SYSTOLIC BLOOD PRESSURE: 102 MMHG | BODY MASS INDEX: 20.81 KG/M2 | OXYGEN SATURATION: 96 % | HEIGHT: 60 IN | DIASTOLIC BLOOD PRESSURE: 62 MMHG | WEIGHT: 106 LBS | HEART RATE: 96 BPM

## 2020-05-19 VITALS — HEIGHT: 60 IN | BODY MASS INDEX: 20.81 KG/M2 | WEIGHT: 106 LBS

## 2020-05-19 DIAGNOSIS — R06.09 EXERTIONAL DYSPNEA: ICD-10-CM

## 2020-05-19 DIAGNOSIS — A37.90 PERTUSSIS: ICD-10-CM

## 2020-05-19 DIAGNOSIS — G20.C PARKINSONISM, UNSPECIFIED PARKINSONISM TYPE (HCC): ICD-10-CM

## 2020-05-19 DIAGNOSIS — E03.9 HYPOTHYROIDISM, UNSPECIFIED TYPE: ICD-10-CM

## 2020-05-19 PROBLEM — J98.9 REACTIVE AIRWAY DISEASE THAT IS NOT ASTHMA: Status: RESOLVED | Noted: 2020-04-25 | Resolved: 2020-05-19

## 2020-05-19 PROCEDURE — 94060 EVALUATION OF WHEEZING: CPT | Performed by: INTERNAL MEDICINE

## 2020-05-19 PROCEDURE — 99999 PULMONARY FUNCTION TESTS: CPT | Performed by: INTERNAL MEDICINE

## 2020-05-19 PROCEDURE — 99214 OFFICE O/P EST MOD 30 MIN: CPT | Mod: 25 | Performed by: INTERNAL MEDICINE

## 2020-05-19 RX ORDER — BENZONATATE 100 MG/1
100 CAPSULE ORAL 3 TIMES DAILY PRN
Qty: 60 CAP | Refills: 0 | Status: SHIPPED | OUTPATIENT
Start: 2020-05-19 | End: 2020-06-05

## 2020-05-19 RX ORDER — MULTIVIT-MIN/IRON/FOLIC ACID/K 18-600-40
1 CAPSULE ORAL
COMMUNITY

## 2020-05-19 RX ORDER — AZITHROMYCIN 250 MG/1
TABLET, FILM COATED ORAL
Qty: 6 TAB | Refills: 0 | Status: SHIPPED | OUTPATIENT
Start: 2020-05-19 | End: 2020-06-05

## 2020-05-19 RX ORDER — PERPHENAZINE/AMITRIPTYLINE HCL 2 MG-25 MG
1 TABLET ORAL
COMMUNITY

## 2020-05-19 ASSESSMENT — PULMONARY FUNCTION TESTS
FEV1_PREDICTED: 96
FEV1/FVC: 89.87
FEV1_PERCENT_PREDICTED: 96
FVC_PERCENT_PREDICTED: 85
FVC_PREDICTED: 2.78
FEV1_PERCENT_CHANGE: 0
FEV1_PREDICTED: 2.21
FEV1/FVC_PREDICTED: 79.5
FVC: 2.39
FEV1: 2.13
FEV1/FVC: 90
FEV1/FVC_PERCENT_PREDICTED: 113
FEV1: 2.14
FEV1_PERCENT_CHANGE: 0
FVC_PERCENT_PREDICTED: 86
FVC: 2.37
FEV1/FVC_PERCENT_PREDICTED: 112

## 2020-05-19 ASSESSMENT — ENCOUNTER SYMPTOMS
DIARRHEA: 0
EYE DISCHARGE: 0
WEIGHT LOSS: 0
CHILLS: 0
STRIDOR: 0
SORE THROAT: 0
SENSORY CHANGE: 0
SHORTNESS OF BREATH: 1
ABDOMINAL PAIN: 0
WHEEZING: 0
MYALGIAS: 0
PSYCHIATRIC NEGATIVE: 1
HEADACHES: 0
EYE PAIN: 0
NAUSEA: 0
COUGH: 1
LOSS OF CONSCIOUSNESS: 0
PALPITATIONS: 0
DIZZINESS: 0
VOMITING: 0
ORTHOPNEA: 0
FOCAL WEAKNESS: 0
HEMOPTYSIS: 0
SINUS PAIN: 0
FEVER: 0
SPUTUM PRODUCTION: 0

## 2020-05-19 ASSESSMENT — FIBROSIS 4 INDEX
FIB4 SCORE: 1.19
FIB4 SCORE: 1.19

## 2020-05-19 NOTE — ASSESSMENT & PLAN NOTE
Protracted recovery from recent URI due to pertussis IgM/IgG positive; although duration and severity of symptoms exceed what would be expected with pertussis.    -She shows no desaturations on multi ox on room air in clinic  -CXR and CT chest unremarkable  -Spirometry today shows no abnormalities  -TTE was completely normal, as was ECG  -No symptomatic improvement with Symbicort    Plan:  -Reassured the pt of multiple normal findings on the work-up above  -While she has already been treated with a course of azithromycin, she did feel symptomatic improvement with it last time, will do a 5-day Z-Tom course in the unlikely event that she was insufficiently treated before for pertussis  -History of hypothyroidism- check TSH/FT4  -Tessalon Perles and chlorpheniramine as needed for cough  -Encouraged to start a gradual exercise regimen to rebuild endurance for deconditioning  -Consider cardiology referral for further work-up/exercise testing, or ?  ID referral for chronic Lyme disease

## 2020-05-19 NOTE — PROGRESS NOTES
Pulmonary Clinic Note    Chief Complaint:  Chief Complaint   Patient presents with   • Follow-Up     Exertional dyspnea 04/24/20, PFT today     HPI:   Mariela Huff is a very pleasant 59 y.o. female never smoker who is followed by Abi Bragg M.D.  and returns to the pulmonary clinic for cough and chest tightness.  Patient was seen for initial consultation in 4/2020.     In 3/2020 Mariela went to urgent care for subacute onset of chest tightness that attributed to an acute upper respiratory tract infection and bronchitis.  She was prescribed albuterol which initially helped, however symptoms have since persisted.  She was then prescribed a Medrol Dosepak which again helped for several days but then the symptoms came right back.  She was then prescribed a Z-Tom by her PCP which also helped, but now the symptoms have returned. She has been using albuterol inhaler multiple times a day with minimal relief.     At the time of her initial consultation her primary symptoms were chest tightness described as a midsternal ache, dry cough, frequent throat clearing, chronic postnasal drip and occasional dizziness.  She reports shortness of breath with gardening.  She denies any palpitations, chest pain, fevers, wheezing or sore throat.  She feels she got the most relief with azithromycin.  Prior to the onset of her symptoms she was very active, frequently biking and enjoys golfing.       She tested negative for COVID-19 on 3/28.     CXR 3/22/2020 showed no significant radiographic cardiopulmonary pathology.     She has a past medical history of chronic Lyme disease in 2014, as well as babesiosis, treated with over a year of antibiotics.  She also has a history of shoulder arthritis, diverticulosis, hypothyroidism on synthroid, and parkinsonism on Artane.    Interval events since last visit:  Since her last visit she had an ER visit for similar symptoms as above.  CT was obtained and pulmonary embolism or  infectious process.  ECG was normal.  IgG and IgM for pertussis returned positive.  All the symptoms started 3/2020.  Symptomatically she continues to report a dry cough and intermittent dyspnea on exertion that waxes and wanes with good days and bad.  She is able to do a boxing exercise class for about 1 hour/day but is limited due to coughing.  She tried Symbicort for 3 to 4 days but developed dysphonia and discontinued.  She does feel minimal relief with albuterol.  Spirometry today in clinic shows no obstruction and no bronchodilator response.      Past Medical History:   Diagnosis Date   • Anesthesia     severe ponv, hard to wake up   • Arthritis     shoulders   • Chest tightness    • Cough    • Diverticulosis 2012    pt stated it was noted on colonoscopy   • Hypothyroid     hypothyroid   • Infectious disease     around bronchitis/flu 12/2011   • Lyme disease     chronic   • Pain 04/2018    right foot, bilateral shoulder   • Painful breathing    • Parkinsonism (HCC) 1/24/2018   • Shortness of breath        Past Surgical History:   Procedure Laterality Date   • TOE FUSION Right 5/7/2018    Procedure: TOE FUSION - 1ST MTP;  Surgeon: Janes Benavidez M.D.;  Location: SURGERY SAME DAY [facility];  Service: Orthopedics   • FLEXOR TENDON REPAIR Right 5/7/2018    Procedure: FLEXOR TENDON REPAIR - 4TH RELEASE W/SOFT TISSUE RELEASE 2/3;  Surgeon: Janes Benavidez M.D.;  Location: SURGERY SAME DAY [facility];  Service: Orthopedics   • HARDWARE REMOVAL ORTHO Right 5/7/2018    Procedure: HARDWARE REMOVAL ORTHO - INTERNAL FIXATION;  Surgeon: Janes Benavidez M.D.;  Location: SURGERY SAME DAY [facility];  Service: Orthopedics   • ORTHOPEDIC OSTEOTOMY Right 5/7/2018    Procedure: ORTHOPEDIC OSTEOTOMY - DISTAL METATARSAL 2/3/4;  Surgeon: Janes Benavidez M.D.;  Location: SURGERY SAME DAY [facility];  Service: Orthopedics   • BUNIONECTOMY Right 5/1/2017    Procedure: BUNIONECTOMY - PROXIMAL HALLUX VALGUS CORRECTION  W/BONE GRAFT;  Surgeon: Janes Benavidez M.D.;  Location: SURGERY [facility];  Service:    • NEUROMA EXCISION  5/1/2017    Procedure: NEUROMA EXCISION - 2ND WEBSPACE;  Surgeon: Janes Benavidez M.D.;  Location: SURGERY [facility];  Service:    • BREAST IMPLANT REVISION Bilateral 4/11/2016    Procedure: BREAST IMPLANT EXCHANGE OF SALINE TO SILICONE W/REPOSITIONING OF LATERAL FOLDS;  Surgeon: Mikey Adkins M.D.;  Location: SURGERY [facility];  Service:    • BREAST BIOPSY  1/24/2012    Performed by SANDRA ESTRADA at SURGERY SAME DAY [facility] ORS   • SHOULDER ARTHROSCOPY  12/1/2010    Performed by KATE CHANCE at SURGERY [facility] ORS   • SHOULDER DECOMPRESSION  12/1/2010    Performed by KATE CHANCE at SURGERY [facility] ORS   • ROTATOR CUFF REPAIR  12/1/2010    Performed by KATE CHANCE at SURGERY [facility] ORS   • SHOULDER DECOMPRESSION ARTHROSCOPIC  5/13/2009    Performed by KATE CHANCE at SURGERY [facility] ORS   • ROTATOR CUFF REPAIR  5/13/2009    Performed by KATE CHANCE at SURGERY [facility]   • HYSTERECTOMY, VAGINAL  1999    fibroids   • PB ENLARGE BREAST WITH IMPLANT Bilateral 1999   • OTHER ORTHOPEDIC SURGERY  1993    joint repair left thumb   • US-NEEDLE CORE BX-BREAST PANEL         Social History     Socioeconomic History   • Marital status:      Spouse name: Not on file   • Number of children: Not on file   • Years of education: Not on file   • Highest education level: Not on file   Occupational History   • Not on file   Social Needs   • Financial resource strain: Not on file   • Food insecurity     Worry: Not on file     Inability: Not on file   • Transportation needs     Medical: Not on file     Non-medical: Not on file   Tobacco Use   • Smoking status: Never Smoker   • Smokeless tobacco: Never Used   Substance and Sexual Activity   • Alcohol use: Yes     Alcohol/week: 0.6 oz     Types: 1 Glasses of wine per week     Comment: Occasionally   • Drug  use: No   • Sexual activity: Yes     Partners: Male   Lifestyle   • Physical activity     Days per week: Not on file     Minutes per session: Not on file   • Stress: Not on file   Relationships   • Social connections     Talks on phone: Not on file     Gets together: Not on file     Attends Protestant service: Not on file     Active member of club or organization: Not on file     Attends meetings of clubs or organizations: Not on file     Relationship status: Not on file   • Intimate partner violence     Fear of current or ex partner: Not on file     Emotionally abused: Not on file     Physically abused: Not on file     Forced sexual activity: Not on file   Other Topics Concern   • Not on file   Social History Narrative   • Not on file          Family History   Problem Relation Age of Onset   • Cancer Paternal Aunt         breast   • Diabetes Mother    • Thyroid Mother    • Diabetes Father    • Thyroid Daughter    • Thyroid Sister    • Cancer Paternal Aunt         breast, uterine   • Cancer Paternal Aunt         bladder   • Cancer Paternal Aunt         colon   • Heart Disease Neg Hx    • Stroke Neg Hx    • Alcohol/Drug Neg Hx        Current Outpatient Medications on File Prior to Visit   Medication Sig Dispense Refill   • Cyanocobalamin (B-12) 3000 MCG SL Tab Place 1 Tab under tongue.     • Ascorbic Acid (VITAMIN C) 500 MG Chew Tab Take 500 mg by mouth every day.     • Cholecalciferol (VITAMIN D) 50 MCG (2000 UT) Cap Take 1 Cap by mouth.     • albuterol 108 (90 Base) MCG/ACT Aero Soln inhalation aerosol Inhale 2 Puffs by mouth every 6 hours as needed for Shortness of Breath. 8.5 g 0   • trihexyphenidyl (ARTANE) 2 MG Tab Take 1 Tab by mouth 3 times a day. 90 Tab 5   • levothyroxine (SYNTHROID) 88 MCG Tab Take 1 Tab by mouth Every morning on an empty stomach. 90 Tab 3   • budesonide-formoterol (SYMBICORT) 160-4.5 MCG/ACT Aerosol Inhale 2 Puffs by mouth 2 Times a Day. Use spacer. Rinse mouth after each use. (Patient not  taking: Reported on 5/19/2020) 1 Inhaler 3     No current facility-administered medications on file prior to visit.        Allergies: Pcn [penicillins]; Percocet [oxycodone-acetaminophen]; and Symbicort [budesonide-formoterol fumarate]      ROS:   Review of Systems   Constitutional: Positive for malaise/fatigue. Negative for chills, fever and weight loss.   HENT: Negative for congestion, sinus pain and sore throat.    Eyes: Negative for pain and discharge.   Respiratory: Positive for cough (dry) and shortness of breath. Negative for hemoptysis, sputum production, wheezing and stridor.    Cardiovascular: Positive for chest pain (tightness). Negative for palpitations, orthopnea and leg swelling.   Gastrointestinal: Negative for abdominal pain, diarrhea, nausea and vomiting.   Genitourinary: Negative for dysuria, frequency and urgency.   Musculoskeletal: Negative for joint pain and myalgias.   Skin: Negative for rash.   Neurological: Negative for dizziness, sensory change, focal weakness, loss of consciousness and headaches.   Psychiatric/Behavioral: Negative.    All other systems reviewed and are negative.      Vitals:  /62 (BP Location: Left arm, Patient Position: Sitting, BP Cuff Size: Adult)   Pulse 96   Ht 1.524 m (5')   Wt 48.1 kg (106 lb)   SpO2 96%     Physical Exam:  Physical Exam  Vitals signs and nursing note reviewed.   Constitutional:       General: She is not in acute distress.     Appearance: Normal appearance. She is well-developed and normal weight. She is not ill-appearing or diaphoretic.      Comments: Pleasant  Appears fatigued   HENT:      Head: Normocephalic.   Eyes:      General: No scleral icterus.        Right eye: No discharge.         Left eye: No discharge.      Conjunctiva/sclera: Conjunctivae normal.      Pupils: Pupils are equal, round, and reactive to light.   Neck:      Thyroid: No thyromegaly.      Vascular: No JVD.   Cardiovascular:      Rate and Rhythm: Normal rate and  regular rhythm.      Heart sounds: Normal heart sounds. No murmur. No gallop.    Pulmonary:      Effort: Pulmonary effort is normal. No respiratory distress.      Breath sounds: Normal breath sounds. No wheezing or rales.   Abdominal:      General: There is no distension.      Palpations: Abdomen is soft.      Tenderness: There is no abdominal tenderness. There is no guarding.   Musculoskeletal:         General: No tenderness.   Lymphadenopathy:      Cervical: No cervical adenopathy.   Skin:     General: Skin is warm.      Capillary Refill: Capillary refill takes less than 2 seconds.      Findings: No erythema or rash.   Neurological:      General: No focal deficit present.      Mental Status: She is alert and oriented to person, place, and time.      Cranial Nerves: No cranial nerve deficit.      Sensory: No sensory deficit.      Motor: No abnormal muscle tone.      Comments: Resting tremor   Psychiatric:         Mood and Affect: Mood normal.         Behavior: Behavior normal.         Thought Content: Thought content normal.       Laboratory Data:    PFTs as reviewed by me personally show: Spirometry today in clinic shows no obstruction and no bronchodilator response.    Imaging as reviewed by me personally show: CT angiogram from hospitalization personally reviewed showing no abnormalities.    Assessment/Plan:    Problem List Items Addressed This Visit     Exertional dyspnea     Protracted recovery from recent URI due to pertussis IgM/IgG positive; although duration and severity of symptoms exceed what would be expected with pertussis.    -She shows no desaturations on multi ox on room air in clinic  -CXR and CT chest unremarkable  -Spirometry today shows no abnormalities  -TTE was completely normal, as was ECG  -No symptomatic improvement with Symbicort    Plan:  -Reassured the pt of multiple normal findings on the work-up above  -While she has already been treated with a course of azithromycin, she did feel  symptomatic improvement with it last time, will do a 5-day Z-Tom course in the unlikely event that she was insufficiently treated before for pertussis  -History of hypothyroidism- check TSH/FT4  -Tessalon Perles and chlorpheniramine as needed for cough  -Encouraged to start a gradual exercise regimen to rebuild endurance for deconditioning  -Consider cardiology referral for further work-up/exercise testing, or ?  ID referral for chronic Lyme disease         Relevant Orders    TSH WITH REFLEX TO FT4    Hypothyroidism     Check TSH/free T4         Pertussis    Relevant Medications    Chlorpheniramine-Codeine 2-10 MG/5ML Liquid    azithromycin (ZITHROMAX) 250 MG Tab    benzonatate (TESSALON PERLES) 100 MG Cap    Parkinsonism (HCC)     On Artane             Return in about 4 weeks (around 6/16/2020).     This note was generated using voice recognition software which has a chance of producing errors of grammar and possibly content.  I have made every reasonable attempt to find and correct any obvious errors, but it should be expected that some may not be found prior to finalization of this note.  __________  Chester Taylor MD  Pulmonary and Critical Care Medicine  [facility]

## 2020-05-19 NOTE — ASSESSMENT & PLAN NOTE
Protracted recovery from recent URI due to pertussis IgM/IgG positive; although duration and severity of symptoms exceed what would be expected with for pertussis     -She shows no desaturations on multi ox on room air in clinic  -CXR and CT chest unremarkable  -Spirometry showing no abnormalities  -TTE was completely normal, as was ECG  -No symptomatic improvement with Symbicort    Plan:  -Reassured the pt of multiple normal findings on the work-up above  -While she has already been treated with a course of azithromycin, she did feel symptomatic improvement with it last time, will do a 5-day Z-Tom course in the unlikely event that she was insufficiently treated before for pertussis  -Tessalon Perles and chlorpheniramine as needed for cough  -Encouraged to start a gradual exercise regimen to rebuild endurance for deconditioning  -Consider cardiology referral for further work-up/exercise testing, or ?  ID referral for chronic Lyme disease

## 2020-05-19 NOTE — TELEPHONE ENCOUNTER
Caller: Mariela    Phone Number: [phone] (home)     Message: Called pt to inform pt of the TSH lab order.  Pt wanted to know since she was positive her Pertussis, is she still contagious?

## 2020-05-19 NOTE — PROCEDURES
1.  No evidence of an obstructive ventilatory defect on spirometry.  There is no significant bronchodilator response.  2.  Flow volume loop is consistent with the above interpretation.  3.  No prior PFTs for comparison.  __________  Chester Taylor MD  Pulmonary and Critical Care Medicine  [facility]

## 2020-05-20 NOTE — TELEPHONE ENCOUNTER
Chester Taylor M.D.  You 20 hours ago (6:43 PM)      No she should be fine.  She is so far outside the window from when she initially got it that should be cleared from her system and no longer contagious, we are only giving azithromycin off label.            Called pt and l/m. Asking pt to return my call.

## 2020-05-21 ENCOUNTER — HOSPITAL ENCOUNTER (OUTPATIENT)
Dept: LAB | Facility: MEDICAL CENTER | Age: 60
End: 2020-05-21
Attending: INTERNAL MEDICINE
Payer: COMMERCIAL

## 2020-05-21 LAB — TSH SERPL DL<=0.005 MIU/L-ACNC: 0.71 UIU/ML (ref 0.38–5.33)

## 2020-05-21 PROCEDURE — 36415 COLL VENOUS BLD VENIPUNCTURE: CPT

## 2020-05-21 PROCEDURE — 84443 ASSAY THYROID STIM HORMONE: CPT

## 2020-06-05 ENCOUNTER — OFFICE VISIT (OUTPATIENT)
Dept: NEUROLOGY | Facility: MEDICAL CENTER | Age: 60
End: 2020-06-05
Payer: COMMERCIAL

## 2020-06-05 VITALS
BODY MASS INDEX: 21.2 KG/M2 | WEIGHT: 108 LBS | SYSTOLIC BLOOD PRESSURE: 102 MMHG | HEART RATE: 94 BPM | DIASTOLIC BLOOD PRESSURE: 62 MMHG | TEMPERATURE: 98.7 F | HEIGHT: 60 IN

## 2020-06-05 DIAGNOSIS — G20.C PARKINSONISM, UNSPECIFIED PARKINSONISM TYPE (HCC): Primary | ICD-10-CM

## 2020-06-05 PROCEDURE — 99214 OFFICE O/P EST MOD 30 MIN: CPT | Performed by: PSYCHIATRY & NEUROLOGY

## 2020-06-05 RX ORDER — TRIHEXYPHENIDYL HYDROCHLORIDE 2 MG/1
2 TABLET ORAL 3 TIMES DAILY
Qty: 270 TAB | Refills: 3 | Status: SHIPPED | OUTPATIENT
Start: 2020-06-05 | End: 2020-09-21

## 2020-06-05 ASSESSMENT — ENCOUNTER SYMPTOMS
FALLS: 0
TREMORS: 1
MEMORY LOSS: 0
INSOMNIA: 0
HALLUCINATIONS: 0

## 2020-06-05 ASSESSMENT — FIBROSIS 4 INDEX: FIB4 SCORE: 1.19

## 2020-06-05 NOTE — PROGRESS NOTES
Subjective:      Mariela Huff is a 59 y.o. female who presents for follow-up, with a history of right-sided eleanor-parkinsonism with dystonia.    HPI    Mariela states that the right-sided stiffness and tremor has increased may be slightly, she is more aware of some balance difficulties.  Specifically she has to focus more on moving the right leg, especially when she first starts to walk.  She does not shuffle or trip, but walking up hills is becoming more problematic.  When she gets moving it is always a little easier.  The right arm swing is always more diminished.  The tremulousness with the right hand has not increased but the stiffness and use of the hand is a little more noticeable.    We did increase the Artane from twice a day to 3 times a day taken at 6 AM, 12 PM and then around 6 PM.  This seems to have helped the a.m. stiffness with the right shoulder and proximal right leg.  She is tolerating the additional dose without issues of diarrhea, dry mouth, constipation or changes in urine habit.    Cognition is intact.  Voice volume is still diminished but she does not drool and swallowing is normal.  She denies dyskinesias, peak-dose effect, wearing-off, fluctuations or on/off episodes.  She continues to deny signs of dysautonomia including early satiety with nausea and vomiting, severe constipation, urinary retention or orthostatic dizziness and syncope.  Blood pressures have always been a bit on the low side but never pathologically.    Unfortunately, because of health issues and COVID-19, she has yet to be able to schedule an appointment with the movement disorder specialist at [facility], she is now scheduled to see them in July of this year.  Work-up in the past from the [facility] included Amelia studies, EMG and EEG studies, their clinical impression again was of Parkinson's disease, supported by the data on diagnostic studies, most significantly with the former study revealing diminished dopamine  transporter activity bilaterally, left greater than right, in the putamen.    Treatment wise, we have failed trials of Sinemet and Stalevo simply because of questionable efficacy though these were used when her symptoms were most mild.  She also had some cognitive side effects.  Since she has done so well on Artane, we have not ventured back into dopamine, though she understands it will be necessary.    Medical, surgical and family histories are reviewed, there are no new drug allergies.  Other than her Artane, she is on an albuterol inhaler, Synthroid, B12 and vitamin D and calcium supplements.    Review of Systems   Constitutional: Negative for malaise/fatigue.   Musculoskeletal: Negative for falls.   Neurological: Positive for tremors.   Psychiatric/Behavioral: Negative for hallucinations and memory loss. The patient does not have insomnia.    All other systems reviewed and are negative.       Objective:     /62 (BP Location: Left arm, Patient Position: Sitting, BP Cuff Size: Adult)   Pulse 94   Temp 37.1 °C (98.7 °F) (Temporal)   Ht 1.524 m (5')   Wt 49 kg (108 lb)   BMI 21.09 kg/m²      Physical Exam    She appears in no acute distress.  Clean and appropriately dressed, she is still very pleasant in spirit and demeanor, always a good soul.  Vital signs are stable.  There is no malar rash.  The neck is supple.  Cardiac evaluation reveals a regular rhythm.  There is no sialorrhea.    She is fully oriented, mood is euthymic, affect mood appropriate, there is no aphasia, apraxia, or inattention.    PERRLA/EOMI, visual fields are full.  There is hypophonia, eye blink frequency diminished, but facial movements are symmetric and sensory exam is intact to light touch.  The tongue and uvula are midline, there is no dysarthria or tachyphemia.  Shoulder shrug and head rotation are intact.    Musculoskeletal exam again reveals the rigidity and dystonic posturing with the right hand, mild rigidity with the right  "foot, but with distraction, tone is normal on the left.  Generalized bradykinesia is unchanged.  There is no asterixis or drift.  Strength is intact in all 4 extremities.  There are no obvious reflex asymmetries, the toes are downgoing.    She stands slowly but still easily, no worse than before.  The right leg is still moved stiffly, stride length diminished on the right only.  Right arm swing is diminished as well, there is no real tremor, just the dystonic posturing with the right hand.  She turns more easily to the left than to the right, she does do so stiffly.  Station is normal.  There is no appendicular dystaxia, repetitive movements are slowed and of diminished amplitude with the right hand when compared to the left, amplitudes are diminished with the left hand.  In the feet the movements are slower with the right foot when compared to the left.    Sensory exam is intact to light touch.     Assessment/Plan:     1. Parkinsonism, unspecified Parkinsonism type (HCC)  We will continue Artane 3 times a day.  Though I would like to get back on dopamine, given that she is about to be evaluated at [facility], I would like to hold on this.  She and I can follow-up in 6 months.    She is still frustrated given the diagnosis and what her prognosis will be in the long-term.  Tremor has never been the bigger problem with the disease, it has mostly been the dystonic posturing and stiffness with the right upper and lower extremity.  Still, she is doing well, the symptoms are mild, her disease mild, and she is functioning well.  Unfortunately the stiffness with postural instability and subjective \"unsteadiness\" are all symptoms that are difficult to treat even in the best of circumstances.    - trihexyphenidyl (ARTANE) 2 MG Tab; Take 1 Tab by mouth 3 times a day.  Dispense: 270 Tab; Refill: 3    Time: 25-minute spent face-to-face for exam, review, discussion, and education, of this over 70% of the time spent counseling and " coordinating care.

## 2020-06-11 ENCOUNTER — HOSPITAL ENCOUNTER (OUTPATIENT)
Dept: RADIOLOGY | Facility: MEDICAL CENTER | Age: 60
End: 2020-06-11
Attending: FAMILY MEDICINE
Payer: COMMERCIAL

## 2020-06-11 DIAGNOSIS — Z12.31 VISIT FOR SCREENING MAMMOGRAM: ICD-10-CM

## 2020-06-11 PROCEDURE — 77067 SCR MAMMO BI INCL CAD: CPT

## 2020-07-15 ENCOUNTER — HOSPITAL ENCOUNTER (OUTPATIENT)
Dept: LAB | Facility: MEDICAL CENTER | Age: 60
End: 2020-07-15
Attending: INTERNAL MEDICINE
Payer: COMMERCIAL

## 2020-07-15 ENCOUNTER — OFFICE VISIT (OUTPATIENT)
Dept: PULMONOLOGY | Facility: HOSPICE | Age: 60
End: 2020-07-15
Payer: COMMERCIAL

## 2020-07-15 VITALS
WEIGHT: 109 LBS | DIASTOLIC BLOOD PRESSURE: 64 MMHG | OXYGEN SATURATION: 94 % | HEIGHT: 60 IN | BODY MASS INDEX: 21.4 KG/M2 | SYSTOLIC BLOOD PRESSURE: 112 MMHG | HEART RATE: 88 BPM

## 2020-07-15 DIAGNOSIS — G20.C PARKINSONISM, UNSPECIFIED PARKINSONISM TYPE (HCC): ICD-10-CM

## 2020-07-15 DIAGNOSIS — J40 BRONCHITIS: ICD-10-CM

## 2020-07-15 DIAGNOSIS — E03.9 HYPOTHYROIDISM, UNSPECIFIED TYPE: ICD-10-CM

## 2020-07-15 DIAGNOSIS — A37.90 PERTUSSIS: ICD-10-CM

## 2020-07-15 DIAGNOSIS — R06.09 EXERTIONAL DYSPNEA: ICD-10-CM

## 2020-07-15 PROCEDURE — 36415 COLL VENOUS BLD VENIPUNCTURE: CPT

## 2020-07-15 PROCEDURE — 86003 ALLG SPEC IGE CRUDE XTRC EA: CPT

## 2020-07-15 PROCEDURE — 99214 OFFICE O/P EST MOD 30 MIN: CPT | Performed by: INTERNAL MEDICINE

## 2020-07-15 RX ORDER — MONTELUKAST SODIUM 10 MG/1
10 TABLET ORAL EVERY EVENING
Qty: 30 TAB | Refills: 11 | Status: SHIPPED | OUTPATIENT
Start: 2020-07-15 | End: 2020-07-15

## 2020-07-15 RX ORDER — ALBUTEROL SULFATE 90 UG/1
2 AEROSOL, METERED RESPIRATORY (INHALATION) EVERY 6 HOURS PRN
Qty: 8.5 G | Refills: 11 | Status: SHIPPED | OUTPATIENT
Start: 2020-07-15 | End: 2021-04-05

## 2020-07-15 RX ORDER — MONTELUKAST SODIUM 10 MG/1
10 TABLET ORAL EVERY EVENING
Qty: 30 TAB | Refills: 11 | Status: SHIPPED | OUTPATIENT
Start: 2020-07-15 | End: 2021-04-05

## 2020-07-15 RX ORDER — ALBUTEROL SULFATE 90 UG/1
2 AEROSOL, METERED RESPIRATORY (INHALATION) EVERY 6 HOURS PRN
Qty: 8.5 G | Refills: 11 | Status: SHIPPED | OUTPATIENT
Start: 2020-07-15 | End: 2020-07-15

## 2020-07-15 ASSESSMENT — ENCOUNTER SYMPTOMS
STRIDOR: 0
DIARRHEA: 0
COUGH: 1
WEIGHT LOSS: 0
WHEEZING: 0
LOSS OF CONSCIOUSNESS: 0
SINUS PAIN: 0
VOMITING: 0
PSYCHIATRIC NEGATIVE: 1
EYE DISCHARGE: 0
FEVER: 0
CHILLS: 0
NAUSEA: 0
SHORTNESS OF BREATH: 1
FOCAL WEAKNESS: 0
SENSORY CHANGE: 0
HEADACHES: 0
SPUTUM PRODUCTION: 0
PALPITATIONS: 0
ABDOMINAL PAIN: 0
HEMOPTYSIS: 0
MYALGIAS: 0
DIZZINESS: 0
EYE PAIN: 0
ORTHOPNEA: 0
SORE THROAT: 0

## 2020-07-15 ASSESSMENT — FIBROSIS 4 INDEX: FIB4 SCORE: 1.19

## 2020-07-15 NOTE — PROGRESS NOTES
Pulmonary Clinic Note    Chief Complaint:  Chief Complaint   Patient presents with   • Follow-Up     Exertional Dyspnea. Last seen 05/19/20   • Results     Labs 05/21/20     HPI:   Mariela Huff is a very pleasant 59 y.o. female never smoker who is followed by Abi Bragg M.D.  and returns to the pulmonary clinic for cough and chest tightness.      In 3/2020 Mariela went to urgent care for subacute onset of chest tightness that attributed to an acute upper respiratory tract infection and bronchitis.  She was prescribed albuterol which initially helped, however symptoms have since persisted.  She was then prescribed a Medrol Dosepak which again helped for several days but then the symptoms came right back.  She was then prescribed a Z-Tom by her PCP which also helped, but now the symptoms have returned. She has been using albuterol inhaler multiple times a day with minimal relief.     At the time of her initial consultation her primary symptoms were chest tightness described as a midsternal ache, dry cough, frequent throat clearing, chronic postnasal drip and occasional dizziness.  She reports shortness of breath with gardening.  She denies any palpitations, chest pain, fevers, wheezing or sore throat.  She feels she got the most relief with azithromycin.  Prior to the onset of her symptoms she was very active, frequently biking and enjoys golfing.       She tested negative for COVID-19 on 3/28.     CXR 3/22/2020 showed no significant radiographic cardiopulmonary pathology.     She has a past medical history of chronic Lyme disease in 2014, as well as babesiosis, treated with over a year of antibiotics.  She also has a history of shoulder arthritis, diverticulosis, hypothyroidism on synthroid, and parkinsonism on Artane.    Interval events since last visit 5/2020:  All symptoms started 3/2020 following URI/pertussis. Primary symptoms are chest tightness particularly in the mid morning and a dry cough  that is relieved after 2 puffs of albuterol.  She denies any nighttime symptoms, no reflux-like symptoms.  She has history of atopic/allergies and was dependent on allergy shots in the past which she has not received for some time.    Encouraged to start a gradual exercise regimen to rebuild endurance for deconditioning. She is now able to do a boxing exercise class for about 1 hour/day without coughing.     Unremarkable diagnostic work-up with normal chest imaging, spirometry, echo, walk test. Exertional dyspnea felt to be due to protracted recovery from recent URI due to pertussis. Trialed on Symbicort, tessalon, chlorpheniramine with no symptomatic improvement, developed dysphonia with ICS/LABA    Past Medical History:   Diagnosis Date   • Anesthesia     severe ponv, hard to wake up   • Arthritis     shoulders   • Chest tightness    • Cough    • Diverticulosis 2012    pt stated it was noted on colonoscopy   • Hypothyroid     hypothyroid   • Infectious disease     around bronchitis/flu 12/2011   • Lyme disease     chronic   • Pain 04/2018    right foot, bilateral shoulder   • Painful breathing    • Parkinsonism (HCC) 1/24/2018   • Shortness of breath        Past Surgical History:   Procedure Laterality Date   • TOE FUSION Right 5/7/2018    Procedure: TOE FUSION - 1ST MTP;  Surgeon: Janes Benavidez M.D.;  Location: SURGERY SAME DAY [facility];  Service: Orthopedics   • FLEXOR TENDON REPAIR Right 5/7/2018    Procedure: FLEXOR TENDON REPAIR - 4TH RELEASE W/SOFT TISSUE RELEASE 2/3;  Surgeon: Janes Benavidez M.D.;  Location: SURGERY SAME DAY [facility];  Service: Orthopedics   • HARDWARE REMOVAL ORTHO Right 5/7/2018    Procedure: HARDWARE REMOVAL ORTHO - INTERNAL FIXATION;  Surgeon: Janes Benavidez M.D.;  Location: SURGERY SAME DAY [facility];  Service: Orthopedics   • ORTHOPEDIC OSTEOTOMY Right 5/7/2018    Procedure: ORTHOPEDIC OSTEOTOMY - DISTAL METATARSAL 2/3/4;  Surgeon: Janes Benavidez M.D.;  Location:  SURGERY SAME DAY [facility];  Service: Orthopedics   • BUNIONECTOMY Right 5/1/2017    Procedure: BUNIONECTOMY - PROXIMAL HALLUX VALGUS CORRECTION W/BONE GRAFT;  Surgeon: Janes Benavidez M.D.;  Location: SURGERY [facility];  Service:    • NEUROMA EXCISION  5/1/2017    Procedure: NEUROMA EXCISION - 2ND WEBSPACE;  Surgeon: Janes Benavidez M.D.;  Location: SURGERY [facility];  Service:    • BREAST IMPLANT REVISION Bilateral 4/11/2016    Procedure: BREAST IMPLANT EXCHANGE OF SALINE TO SILICONE W/REPOSITIONING OF LATERAL FOLDS;  Surgeon: Mikey Adkins M.D.;  [facility];  Service:    • BREAST BIOPSY  1/24/2012    Performed by SANDRA ESTRADA at SURGERY SAME DAY [facility]   • SHOULDER ARTHROSCOPY  12/1/2010    Performed by KATE CHANCE at SURGERY [facility]   • SHOULDER DECOMPRESSION  12/1/2010    Performed by KATE CHANCE at SURGERY [facility]   • ROTATOR CUFF REPAIR  12/1/2010    Performed by KATE CHANCE at SURGERY [facility]   • SHOULDER DECOMPRESSION ARTHROSCOPIC  5/13/2009    Performed by KATE CHANCE at SURGERY [facility]   • ROTATOR CUFF REPAIR  5/13/2009    Performed by KATE CHANCE at SURGERY [facility]   • HYSTERECTOMY, VAGINAL  1999    fibroids   • PB ENLARGE BREAST WITH IMPLANT Bilateral 1999   • OTHER ORTHOPEDIC SURGERY  1993    joint repair left thumb   • US-NEEDLE CORE BX-BREAST PANEL         Social History     Socioeconomic History   • Marital status:      Spouse name: Not on file   • Number of children: Not on file   • Years of education: Not on file   • Highest education level: Not on file   Occupational History   • Not on file   Social Needs   • Financial resource strain: Not on file   • Food insecurity     Worry: Not on file     Inability: Not on file   • Transportation needs     Medical: Not on file     Non-medical: Not on file   Tobacco Use   • Smoking status: Never Smoker   • Smokeless tobacco: Never Used    Substance and Sexual Activity   • Alcohol use: Yes     Alcohol/week: 0.6 oz     Types: 1 Glasses of wine per week     Comment: Occasionally   • Drug use: No   • Sexual activity: Yes     Partners: Male   Lifestyle   • Physical activity     Days per week: Not on file     Minutes per session: Not on file   • Stress: Not on file   Relationships   • Social connections     Talks on phone: Not on file     Gets together: Not on file     Attends Zoroastrianism service: Not on file     Active member of club or organization: Not on file     Attends meetings of clubs or organizations: Not on file     Relationship status: Not on file   • Intimate partner violence     Fear of current or ex partner: Not on file     Emotionally abused: Not on file     Physically abused: Not on file     Forced sexual activity: Not on file   Other Topics Concern   • Not on file   Social History Narrative   • Not on file          Family History   Problem Relation Age of Onset   • Cancer Paternal Aunt         breast   • Diabetes Mother    • Thyroid Mother    • Diabetes Father    • Thyroid Daughter    • Thyroid Sister    • Cancer Paternal Aunt         breast, uterine   • Cancer Paternal Aunt         bladder   • Cancer Paternal Aunt         colon   • Heart Disease Neg Hx    • Stroke Neg Hx    • Alcohol/Drug Neg Hx        Current Outpatient Medications on File Prior to Visit   Medication Sig Dispense Refill   • Cetirizine HCl (ZYRTEC PO) Take  by mouth every day.     • trihexyphenidyl (ARTANE) 2 MG Tab Take 1 Tab by mouth 3 times a day. 270 Tab 3   • Cyanocobalamin (B-12) 3000 MCG SL Tab Place 1 Tab under tongue.     • Ascorbic Acid (VITAMIN C) 500 MG Chew Tab Take 500 mg by mouth every day.     • Cholecalciferol (VITAMIN D) 50 MCG (2000 UT) Cap Take 1 Cap by mouth.     • levothyroxine (SYNTHROID) 88 MCG Tab Take 1 Tab by mouth Every morning on an empty stomach. 90 Tab 3     No current facility-administered medications on file prior to visit.         Allergies: Pcn [penicillins]; Percocet [oxycodone-acetaminophen]; and Symbicort [budesonide-formoterol fumarate]      ROS:   Review of Systems   Constitutional: Positive for malaise/fatigue. Negative for chills, fever and weight loss.   HENT: Negative for congestion, sinus pain and sore throat.    Eyes: Negative for pain and discharge.   Respiratory: Positive for cough (dry) and shortness of breath. Negative for hemoptysis, sputum production, wheezing and stridor.    Cardiovascular: Positive for chest pain (tightness). Negative for palpitations, orthopnea and leg swelling.   Gastrointestinal: Negative for abdominal pain, diarrhea, nausea and vomiting.   Genitourinary: Negative for dysuria, frequency and urgency.   Musculoskeletal: Negative for joint pain and myalgias.   Skin: Negative for rash.   Neurological: Negative for dizziness, sensory change, focal weakness, loss of consciousness and headaches.   Psychiatric/Behavioral: Negative.    All other systems reviewed and are negative.      Vitals:  /64 (BP Location: Left arm, Patient Position: Sitting, BP Cuff Size: Adult)   Pulse 88   Ht 1.524 m (5')   Wt 49.4 kg (109 lb)   SpO2 94%     Physical Exam:  Physical Exam  Vitals signs and nursing note reviewed.   Constitutional:       General: She is not in acute distress.     Appearance: Normal appearance. She is well-developed and normal weight. She is not ill-appearing or diaphoretic.      Comments: Pleasant  Appears fatigued   HENT:      Head: Normocephalic.   Eyes:      General: No scleral icterus.        Right eye: No discharge.         Left eye: No discharge.      Conjunctiva/sclera: Conjunctivae normal.      Pupils: Pupils are equal, round, and reactive to light.   Neck:      Thyroid: No thyromegaly.      Vascular: No JVD.   Cardiovascular:      Rate and Rhythm: Normal rate and regular rhythm.      Heart sounds: Normal heart sounds. No murmur. No gallop.    Pulmonary:      Effort: Pulmonary effort  is normal. No respiratory distress.      Breath sounds: Normal breath sounds. No wheezing or rales.   Abdominal:      General: There is no distension.      Palpations: Abdomen is soft.      Tenderness: There is no abdominal tenderness. There is no guarding.   Musculoskeletal:         General: No tenderness.   Lymphadenopathy:      Cervical: No cervical adenopathy.   Skin:     General: Skin is warm.      Capillary Refill: Capillary refill takes less than 2 seconds.      Findings: No erythema or rash.   Neurological:      General: No focal deficit present.      Mental Status: She is alert and oriented to person, place, and time.      Cranial Nerves: No cranial nerve deficit.      Sensory: No sensory deficit.      Motor: No abnormal muscle tone.      Comments: Resting tremor   Psychiatric:         Mood and Affect: Mood normal.         Behavior: Behavior normal.         Thought Content: Thought content normal.       Laboratory Data:    PFTs as reviewed by me personally show: Spirometry today last visit shows no obstruction and no bronchodilator response.    Imaging as reviewed by me personally show: CT angiogram from hospitalization personally reviewed showing no abnormalities.    Assessment/Plan:    Problem List Items Addressed This Visit     Exertional dyspnea     Postinfectious reactive airway disease following pertussis infection  Oximetry, spirometry, CXR and CT chest unremarkable, TTE was completely normal, as was ECG  No symptomatic improvement with Symbicort, Tessalon, chlorpheniramine  Strong history of atopy/allergic symptoms, previously followed by Dr. Copeland requiring allergy shots     Plan:  -Reassured the pt of multiple normal findings on the work-up above  - Cont gradual exercise regimen to rebuild endurance for deconditioning, no longer coughing during her boxing classes  -Start Singulair, continue Zyrtec and albuterol PRN  -Check allergen panel, consider referral to allergy ([place]) depending on response  to above and allergy panel  -Provided refills         Hypothyroidism     TSH WNL  Continue current dose of levothyroxine         Pertussis     With protracted recovery, suspect postinfectious reactive airway syndrome         Parkinsonism (HCC)     On Artane           Other Visit Diagnoses     Bronchitis        Relevant Medications    albuterol 108 (90 Base) MCG/ACT Aero Soln inhalation aerosol    montelukast (SINGULAIR) 10 MG Tab    Other Relevant Orders    ALLERGEN PANEL, IGE BY IMMUNOCAP ROSY        Return in about 3 months (around 10/15/2020).     This note was generated using voice recognition software which has a chance of producing errors of grammar and possibly content.  I have made every reasonable attempt to find and correct any obvious errors, but it should be expected that some may not be found prior to finalization of this note.  __________  Chester Taylor MD  Pulmonary and Critical Care Medicine  [facility]

## 2020-07-16 NOTE — ASSESSMENT & PLAN NOTE
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 Copeland requiring allergy shots     Plan:  -Reassured the pt of multiple normal findings on the work-up above  - Cont gradual exercise regimen to rebuild endurance for deconditioning, no longer coughing during her boxing classes  -Start Singulair, continue Zyrtec and albuterol PRN  -Check allergen panel, consider referral to allergy ([place]) depending on response to above and allergy panel  -Provided refills

## 2020-07-22 ENCOUNTER — TELEPHONE (OUTPATIENT)
Dept: NEUROLOGY | Facility: MEDICAL CENTER | Age: 60
End: 2020-07-22

## 2020-07-22 LAB — PATHOLOGY STUDY: NORMAL

## 2020-07-22 NOTE — TELEPHONE ENCOUNTER
----- Message from Mariela Huff sent at 7/21/2020  2:36 PM PDT -----  Regarding: Non-Urgent Medical Question  Contact: [phone]  Hi Dr. Cobos,    I wanted to let you know that I went to [facility] last week as we talked about.  They had scheduled me with Dr. Purdy who the  represented was a movement disorder specialist.  Part way into my discussions with him he revealed that he was a general neurologist and not a movement disorder specialist.  He relayed that this has been an ongoing problem with their schedulers.  He made an internal referral so I could get in with the right person.  I’m now scheduled to see Dr. Figueroa on October 1 at 2:00.  I found this very frustrating since I waited several months for the first appointment.  Don’t know if you have any influence down there to get the appointment moved up.  Just thought I’d let you know.      Mariela

## 2020-07-22 NOTE — TELEPHONE ENCOUNTER
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  I understand her frustration.

## 2020-07-29 ENCOUNTER — APPOINTMENT (RX ONLY)
Dept: URBAN - METROPOLITAN AREA CLINIC 4 | Facility: CLINIC | Age: 60
Setting detail: DERMATOLOGY
End: 2020-07-29

## 2020-07-29 DIAGNOSIS — D18.0 HEMANGIOMA: ICD-10-CM

## 2020-07-29 DIAGNOSIS — L82.0 INFLAMED SEBORRHEIC KERATOSIS: ICD-10-CM

## 2020-07-29 DIAGNOSIS — Z71.89 OTHER SPECIFIED COUNSELING: ICD-10-CM

## 2020-07-29 DIAGNOSIS — L81.4 OTHER MELANIN HYPERPIGMENTATION: ICD-10-CM

## 2020-07-29 DIAGNOSIS — D22 MELANOCYTIC NEVI: ICD-10-CM

## 2020-07-29 DIAGNOSIS — L82.1 OTHER SEBORRHEIC KERATOSIS: ICD-10-CM

## 2020-07-29 PROBLEM — D48.5 NEOPLASM OF UNCERTAIN BEHAVIOR OF SKIN: Status: ACTIVE | Noted: 2020-07-29

## 2020-07-29 PROBLEM — D18.01 HEMANGIOMA OF SKIN AND SUBCUTANEOUS TISSUE: Status: ACTIVE | Noted: 2020-07-29

## 2020-07-29 PROBLEM — D22.9 MELANOCYTIC NEVI, UNSPECIFIED: Status: ACTIVE | Noted: 2020-07-29

## 2020-07-29 PROCEDURE — ? COUNSELING

## 2020-07-29 PROCEDURE — ? BIOPSY BY SHAVE METHOD AND DESTRUCTION

## 2020-07-29 PROCEDURE — 99213 OFFICE O/P EST LOW 20 MIN: CPT | Mod: 25

## 2020-07-29 PROCEDURE — 11102 TANGNTL BX SKIN SINGLE LES: CPT

## 2020-07-29 PROCEDURE — ? LIQUID NITROGEN (COSMETIC)

## 2020-07-29 PROCEDURE — ? ADDITIONAL NOTES

## 2020-07-29 PROCEDURE — ? SUNSCREEN RECOMMENDATIONS

## 2020-07-29 ASSESSMENT — LOCATION DETAILED DESCRIPTION DERM
LOCATION DETAILED: RIGHT INFERIOR UPPER BACK
LOCATION DETAILED: LEFT SUPERIOR UPPER BACK
LOCATION DETAILED: RIGHT MID-UPPER BACK
LOCATION DETAILED: LEFT INFERIOR MEDIAL UPPER BACK
LOCATION DETAILED: LEFT MEDIAL UPPER BACK
LOCATION DETAILED: RIGHT SUPERIOR MEDIAL UPPER BACK
LOCATION DETAILED: SUPERIOR THORACIC SPINE

## 2020-07-29 ASSESSMENT — LOCATION ZONE DERM: LOCATION ZONE: TRUNK

## 2020-07-29 ASSESSMENT — LOCATION SIMPLE DESCRIPTION DERM
LOCATION SIMPLE: RIGHT UPPER BACK
LOCATION SIMPLE: LEFT UPPER BACK
LOCATION SIMPLE: UPPER BACK

## 2020-07-29 NOTE — PROCEDURE: BIOPSY BY SHAVE METHOD AND DESTRUCTION
Detail Level: Detailed
Biopsy Type: H and E
Bill As?: Biopsy by Shave Method
Size Of Lesion In Cm (Optional): 0
Size Of Lesion After Curettage: 0.7
Anesthesia Type: 1% lidocaine with epinephrine
Anesthesia Volume In Cc: 2
Hemostasis: Aluminum Chloride and Electrocautery
Destruction Type: electrodesiccation
Number Of Curettages: 3
Wound Care: Vaseline
Lab: [id number]
Lab Facility: 
Render Path Notes In Note?: No
Consent: Written consent was obtained and risks were reviewed including but not limited to scarring, infection, bleeding, scabbing, incomplete removal, nerve damage and allergy to anesthesia.
Post-Care Instructions: I reviewed with the patient in detail post-care instructions. Patient is to keep the biopsy site dry overnight, and then apply bacitracin twice daily until healed. Patient may apply hydrogen peroxide soaks to remove any crusting.
Notification Instructions: Patient will be notified of biopsy results. However, patient instructed to call the office if not contacted within 2 weeks.
Billing Type: Third-Party Bill

## 2020-07-29 NOTE — PROCEDURE: LIQUID NITROGEN (COSMETIC)
Detail Level: Detailed
Billing Information: Bill by Static Price
Render Post-Care Instructions In Note?: no
Post-Care Instructions: I reviewed with the patient in detail post-care instructions. Patient is to wear sunprotection, and avoid picking at any of the treated lesions. Pt may apply Vaseline to crusted or scabbing areas.
Consent: The patient's consent was obtained including but not limited to risks of crusting, scabbing, blistering, scarring, darker or lighter pigmentary change, recurrence, incomplete removal and infection. The patient understands that the procedure is cosmetic in nature and is not covered by insurance.

## 2020-08-13 ENCOUNTER — OFFICE VISIT (OUTPATIENT)
Dept: MEDICAL GROUP | Facility: PHYSICIAN GROUP | Age: 60
End: 2020-08-13
Payer: COMMERCIAL

## 2020-08-13 VITALS
HEART RATE: 78 BPM | HEIGHT: 60 IN | SYSTOLIC BLOOD PRESSURE: 102 MMHG | BODY MASS INDEX: 21.6 KG/M2 | WEIGHT: 110 LBS | RESPIRATION RATE: 16 BRPM | TEMPERATURE: 98.9 F | OXYGEN SATURATION: 97 % | DIASTOLIC BLOOD PRESSURE: 70 MMHG

## 2020-08-13 DIAGNOSIS — R14.0 BLOATING: ICD-10-CM

## 2020-08-13 DIAGNOSIS — Z11.59 ENCOUNTER FOR SCREENING FOR OTHER VIRAL DISEASES: ICD-10-CM

## 2020-08-13 DIAGNOSIS — R53.83 FATIGUE, UNSPECIFIED TYPE: ICD-10-CM

## 2020-08-13 DIAGNOSIS — E03.9 HYPOTHYROIDISM, UNSPECIFIED TYPE: Primary | ICD-10-CM

## 2020-08-13 DIAGNOSIS — R42 DIZZINESS: ICD-10-CM

## 2020-08-13 PROCEDURE — 99214 OFFICE O/P EST MOD 30 MIN: CPT | Performed by: FAMILY MEDICINE

## 2020-08-13 ASSESSMENT — FIBROSIS 4 INDEX: FIB4 SCORE: 1.19

## 2020-08-13 NOTE — ASSESSMENT & PLAN NOTE
This is a chronic condition. She is noticing hair loss, joint pain, bloating, fatigue. No associated nausea/vomiting, diarrhea, constipation, fevers/chills, no heartburn, no pain. The bloating almost always occurs later in the day. She hasn't been able to identify food triggers other than gluten, which she already avoids. She has been having these symptoms for the last 2+ months. She had some dizziness and then vertigo 8/1 - 8/3/2020. She has had continued dizziness since the vertigo.

## 2020-08-13 NOTE — PROGRESS NOTES
Subjective:     CC: multiple symptoms    HPI:   Mariela presents today with     Hypothyroidism  This is a chronic condition. She is noticing hair loss, joint pain, bloating, fatigue. No associated nausea/vomiting, diarrhea, constipation, fevers/chills, no heartburn, no pain. The bloating almost always occurs later in the day. She hasn't been able to identify food triggers other than gluten, which she already avoids. She has been having these symptoms for the last 2+ months. She had some dizziness and then vertigo 8/1 - 8/3/2020. She has had continued dizziness since the vertigo.        Past Medical History:   Diagnosis Date   • Anesthesia     severe ponv, hard to wake up   • Arthritis     shoulders   • Chest tightness    • Cough    • Diverticulosis 2012    pt stated it was noted on colonoscopy   • Hypothyroid     hypothyroid   • Infectious disease     around bronchitis/flu 12/2011   • Lyme disease     chronic   • Pain 04/2018    right foot, bilateral shoulder   • Painful breathing    • Parkinsonism (HCC) 1/24/2018   • Shortness of breath        Social History     Tobacco Use   • Smoking status: Never Smoker   • Smokeless tobacco: Never Used   Substance Use Topics   • Alcohol use: Yes     Alcohol/week: 0.6 oz     Types: 1 Glasses of wine per week     Comment: Occasionally   • Drug use: No       Current Outpatient Medications Ordered in Epic   Medication Sig Dispense Refill   • levothyroxine (SYNTHROID) 88 MCG Tab TAKE 1 TABLET EVERY MORNING ON AN EMPTY STOMACH 90 Tab 2   • Cetirizine HCl (ZYRTEC PO) Take  by mouth every day.     • albuterol 108 (90 Base) MCG/ACT Aero Soln inhalation aerosol Inhale 2 Puffs by mouth every 6 hours as needed for Shortness of Breath. 8.5 g 11   • montelukast (SINGULAIR) 10 MG Tab Take 1 Tab by mouth every evening. 30 Tab 11   • trihexyphenidyl (ARTANE) 2 MG Tab Take 1 Tab by mouth 3 times a day. 270 Tab 3   • Cyanocobalamin (B-12) 3000 MCG SL Tab Place 1 Tab under tongue.     • Ascorbic  Acid (VITAMIN C) 500 MG Chew Tab Take 500 mg by mouth every day.     • Cholecalciferol (VITAMIN D) 50 MCG (2000 UT) Cap Take 1 Cap by mouth.       No current Epic-ordered facility-administered medications on file.        Allergies:  Pcn [penicillins], Percocet [oxycodone-acetaminophen], and Symbicort [budesonide-formoterol fumarate]    Health Maintenance: deferred pneumonia vaccine for pulm and she wants to wait on Shingles    ROS:  Gen: no fevers/chills  CV: no chest pain  GI: no nausea/vomiting    Objective:     Exam:  /70   Pulse 78   Temp 37.2 °C (98.9 °F)   Resp 16   Ht 1.524 m (5')   Wt 49.9 kg (110 lb)   SpO2 97%   BMI 21.48 kg/m²  Body mass index is 21.48 kg/m².    Gen: Alert and oriented, No apparent distress.  Neck: Neck is supple without lymphadenopathy.  Lungs: Normal effort, CTA bilaterally, no wheezes, rhonchi, or rales  CV: Regular rate and rhythm. No murmurs, rubs, or gallops.  GI:  + BS, ND/NT, no masses, no hepatosplenomegaly  Ext: No clubbing, cyanosis, edema.    Assessment & Plan:     59 y.o. female with the following -     1. Hypothyroidism, unspecified type  This is a chronic condition, controlled.  Labs in May, 2020 showed her TSH was within normal range indicating her dose is appropriate.  However, she has been noting much increased diffuse hair loss and thinning as well as some fatigue and she is concerned it may be her thyroid so we will check the labs again.  - TSH; Future  - FREE THYROXINE; Future  - T3 FREE; Future    2. Fatigue, unspecified type  This is a new condition.  She has been feeling fatigued for at least the last 2 months.  There is no associated muscle cramping or pain.  As discussed above we will check to see if her thyroid dose is no longer appropriate but we will also check some other labs to evaluate further.  - CBC WITHOUT DIFFERENTIAL; Future  - Comp Metabolic Panel; Future    3. Dizziness  This is a new condition.  She had vertigo for 3 days starting  8/1/2020.  Before that she had some dizziness leading up to the episodes and then after the vertigo ended she still getting intermittently dizzy.  She never was evaluated by anyone.  She does state the dizziness got worse with sudden movement so it may be positional.  Additionally, Singulair and Zyrtec both have dizziness as reported side effects and 2% of people.  Therefore, her dizziness is either coming from these medications, from BPPV, or combination of both.  She has been on Serotec for several years but singular is a new medication added by her pulmonologist.  We will try to treat the positional aspect first and if that does not resolve symptoms we may have to discuss adjusting medications.  - REFERRAL TO PHYSICAL THERAPY Reason for Therapy: Eval/Treat/Report    4. Bloating  This is a new condition.  For last 2 months or more she is been getting bloating with no other associate abdominal symptoms whatsoever.  She has not been able to isolate any food triggers other than gluten but she already has been avoiding gluten for the last 5 years.  We will go ahead and check an H. pylori as sometimes I have seen it cause bloating alone.  We will also work on a FODMAP elimination diet see if he can isolate any food triggers.  - H.PYLORI STOOL ANTIGEN; Future  -FODMAP elimination diet  -If H. pylori negative and FODMAP does not improve symptoms, get abdominal and pelvic ultrasounds      Return in about 4 weeks (around 9/10/2020) for f/u labs, bloating, fatigue.    Please note that this dictation was created using voice recognition software. I have made every reasonable attempt to correct obvious errors, but I expect that there are errors of grammar and possibly content that I did not discover before finalizing the note.

## 2020-08-19 ENCOUNTER — HOSPITAL ENCOUNTER (OUTPATIENT)
Dept: LAB | Facility: MEDICAL CENTER | Age: 60
End: 2020-08-19
Attending: FAMILY MEDICINE
Payer: COMMERCIAL

## 2020-08-19 ENCOUNTER — TELEPHONE (OUTPATIENT)
Dept: NEUROLOGY | Facility: MEDICAL CENTER | Age: 60
End: 2020-08-19

## 2020-08-19 DIAGNOSIS — Z11.59 ENCOUNTER FOR SCREENING FOR OTHER VIRAL DISEASES: ICD-10-CM

## 2020-08-19 LAB
COVID ORDER STATUS COVID19: NORMAL
SARS-COV-2 RNA RESP QL NAA+PROBE: NOTDETECTED
SPECIMEN SOURCE: NORMAL

## 2020-08-19 PROCEDURE — C9803 HOPD COVID-19 SPEC COLLECT: HCPCS

## 2020-08-19 PROCEDURE — U0003 INFECTIOUS AGENT DETECTION BY NUCLEIC ACID (DNA OR RNA); SEVERE ACUTE RESPIRATORY SYNDROME CORONAVIRUS 2 (SARS-COV-2) (CORONAVIRUS DISEASE [COVID-19]), AMPLIFIED PROBE TECHNIQUE, MAKING USE OF HIGH THROUGHPUT TECHNOLOGIES AS DESCRIBED BY CMS-2020-01-R: HCPCS

## 2020-08-19 NOTE — TELEPHONE ENCOUNTER
At this point in time, it would be very difficult to start medication with her symptoms.  Balance difficulties rarely respond easily to medicines, she has already failed Sinemet (we tried Stalevo), Symmetrel and Azilect.  Her symptoms have been so mild that it was difficult to see benefit, and this was when the drugs themselves were well-tolerated.  Sinemet was the one drug that would most likely provide benefit, she simply developed dizziness and lightheadedness that was unbearable.  Azilect did absolutely nothing though she tolerated it, Symmetrel also created real problems with dizziness.     She needs to continue on her Artane 2 mg, 3 times a day.  This drug is helping her, but unfortunately she is going to have to wait until she sees the movement disorder specialist in October before I can add anything else.

## 2020-08-19 NOTE — TELEPHONE ENCOUNTER
----- Message from Mariela Huff sent at 8/18/2020 11:20 AM PDT -----  Regarding: Prescription Question  Contact: [phone]  Hello!  When I was in last we discussed that my gait is getting more awkward. Since I was supposed see the  [facility] movement disorder specialist the following week, we held off adding any meds to my regimen. Then LELE Holbrook messed up the appt and I won’t see the specialist now until October. Is there something you can suggest to address the gait. I’ve also been having issues with vertigo/dizziness.  Can that be associated with Parkinson’s?

## 2020-08-19 NOTE — TELEPHONE ENCOUNTER
----- Message from Mariela Huff sent at 8/18/2020 11:20 AM PDT -----  Regarding: Prescription Question  Contact: [phone]  Hello!  When I was in last we discussed that my gait is getting more awkward. Since I was supposed see the  [facility] movement disorder specialist the following week, we held off adding any meds to my regimen. Then LELE Holbrook messed up the appt and I won’t see the specialist now until October. Is there something you can suggest to address the gait. I’ve also been having issues with vertigo/dizziness.  Can that be associated with Parkinson’s?

## 2020-09-01 NOTE — OP THERAPY EVALUATION
"  Outpatient Physical Therapy  VESTIBULAR EVALUATION    [address]  [address]  [place] [address]  Phone:  [phone]  Fax:  [phone]    Date of Evaluation: 2020    Patient: Mariela Huff  YOB: 1960  MRN: [id number]     Referring Provider: [address]  [place] [address]   Referring Diagnosis: Dizziness [R42]     Time Calculation    Start time: 720  Stop time: 815 Time Calculation (min): 55 minutes           Chief Complaint: Vertigo    Visit Diagnoses     ICD-10-CM   1. Dizziness  R42         History of Present Illness:     Mechanism of injury:    Pt presents to PT with complaint of dizziness that began upon awaking 2020. States it was a true spinning sensation, which lasted for a couple hours.  Continued to feel \"off\" for the next couple days, but steadily improving.  The dizziness is no longer constant, but does come and go through the day in bursts, usually lasting a couple mins.  May happen a couple times a day. Unable to ID triggers, but does feel better to sit quiet. No falls as a result. Chronic numbness that comes and goes in her feet. Unable to say that she was ill at the onset, but does admit recent issues with asthma and sinus congestion.  Does manage with zytrec and singulair. No recent ENT visit.     Prior level of function:  Retired from admin type work.  Hobbies: biking, rock steady boxing 4-5x/week.     Headaches: no headaches      Ear problems:  None    Sleep disturbance:  Not disrupted  Symptoms:     Current symptom ratin    At best symptom ratin    At worst symptom ratin (most recent was 2 days ago)    Progression:  Improving  Social Support:     Lives in:  Multiple-level home (lives on the main floor)    Lives with:  Spouse  Treatments:     No prior treatments received    Patient goals:     Other patient goals:  Resolve dizziness      Past Medical History: "   Diagnosis Date   • Anesthesia     severe ponv, hard to wake up   • Arthritis     shoulders   • Chest tightness    • Cough    • Diverticulosis 2012    pt stated it was noted on colonoscopy   • Hypothyroid     hypothyroid   • Infectious disease     around bronchitis/flu 12/2011   • Lyme disease     chronic   • Pain 04/2018    right foot, bilateral shoulder   • Painful breathing    • Parkinsonism (HCC) 1/24/2018   • Shortness of breath      Past Surgical History:   Procedure Laterality Date   • TOE FUSION Right 5/7/2018    Procedure: TOE FUSION - 1ST MTP;  Surgeon: Janes Benavidez M.D.;  Location: SURGERY SAME DAY [facility];  Service: Orthopedics   • FLEXOR TENDON REPAIR Right 5/7/2018    Procedure: FLEXOR TENDON REPAIR - 4TH RELEASE W/SOFT TISSUE RELEASE 2/3;  Surgeon: Janes Benavidez M.D.;  Location: SURGERY SAME DAY [facility];  Service: Orthopedics   • HARDWARE REMOVAL ORTHO Right 5/7/2018    Procedure: HARDWARE REMOVAL ORTHO - INTERNAL FIXATION;  Surgeon: Janes Benavidez M.D.;  Location: SURGERY SAME DAY [facility];  Service: Orthopedics   • ORTHOPEDIC OSTEOTOMY Right 5/7/2018    Procedure: ORTHOPEDIC OSTEOTOMY - DISTAL METATARSAL 2/3/4;  Surgeon: Janes Benavidez M.D.;  Location: SURGERY SAME DAY [facility];  Service: Orthopedics   • BUNIONECTOMY Right 5/1/2017    Procedure: BUNIONECTOMY - PROXIMAL HALLUX VALGUS CORRECTION W/BONE GRAFT;  Surgeon: Janes Benavidez M.D.;  Location: SURGERY [facility];  Service:    • NEUROMA EXCISION  5/1/2017    Procedure: NEUROMA EXCISION - 2ND WEBSPACE;  Surgeon: Janes Benavidez M.D.;  Location: SURGERY [facility];  Service:    • BREAST IMPLANT REVISION Bilateral 4/11/2016    Procedure: BREAST IMPLANT EXCHANGE OF SALINE TO SILICONE W/REPOSITIONING OF LATERAL FOLDS;  Surgeon: Mikey Adkins M.D.;  Location: SURGERY [facility];  Service:    • BREAST BIOPSY  1/24/2012    Performed by SANDRA ESTRADA at SURGERY SAME DAY [facility]   • SHOULDER  ARTHROSCOPY  12/1/2010    Performed by KATE CHANCE at SURGERY [facility]   • SHOULDER DECOMPRESSION  12/1/2010    Performed by KATE CHANCE at SURGERY [facility]   • ROTATOR CUFF REPAIR  12/1/2010    Performed by KATE CHANCE at SURGERY [facility]   • SHOULDER DECOMPRESSION ARTHROSCOPIC  5/13/2009    Performed by KATE CHANCE at SURGERY [facility]   • ROTATOR CUFF REPAIR  5/13/2009    Performed by KATE CHANCE at SURGERY [facility]   • HYSTERECTOMY, VAGINAL  1999    fibroids   • PB ENLARGE BREAST WITH IMPLANT Bilateral 1999   • OTHER ORTHOPEDIC SURGERY  1993    joint repair left thumb   • US-NEEDLE CORE BX-BREAST PANEL       Social History     Tobacco Use   • Smoking status: Never Smoker   • Smokeless tobacco: Never Used   Substance Use Topics   • Alcohol use: Yes     Alcohol/week: 0.6 oz     Types: 1 Glasses of wine per week     Comment: Occasionally     Family and Occupational History     Socioeconomic History   • Marital status:      Spouse name: Not on file   • Number of children: Not on file   • Years of education: Not on file   • Highest education level: Not on file   Occupational History   • Not on file       Objective:    Gait:     Assessment: difficulty with concurrent head rotation  Vestibulospinal Exam:     MCTSIB:         Firm, eyes open: within normal limits        Firm, eyes closed: within normal limits        Foam, eyes open: within normal limits        Foam, eyes closed: within normal limits        Composite Score: within normal limits      Oculomotor Exam:         Details: No spontaneous nystagmus central gaze          Details: No spontaneous nystagmus eccentric gaze    No saccadic eye movements    Smooth pursuit present    Convergence:        Normal convergence    No skew  Active Range of Motion:     Within functional limits  Limb Ataxia Exam:     Finger-to-Nose:         Intention tremor: none    Dysdiadochokinesia: none.  Strength Exam:     Upper  extremities within functional limits    Lower extremities within functional limits  Reflex Exam:       Deep Tendon Reflexes:         Left L4 (Patellar): normal (2+)        Left S1 (Achilles): normal (2+)        Right L4 (Patellar): brisk (3+)        Right S1 (Achilles): brisk (3+)  Sensation Tests:     Left Light Touch Sensation:         All left lumbar dermatomes intact      Right Light Touch Sensation:         All right lumbar dermatomes intact      Vestibulo-Ocular Exam:     Abnormal head thrust test        Details: corrective saccade on head moving right (1 of 4 attempts)    Comments: VORx1 is symptomatic at slow rate.   BPPV Exam:     Normal Dundas-Hallpike    Normal straight head-hanging test    Normal roll test  Breathing-Related Tests:     Normal hyperventilation test    Normal Valsalva test        Therapeutic Treatments and Modalities:     1. Functional Training, Self Care (CPT 72644), Education: discussed vestibular hypofunction, how these episodes can relate to sinus health, goals of VRT, testing results. HEP: head circles, VORx1 and ankle sways. HO provided.     Time-based treatments/modalities:    Physical Therapy Timed Treatment Charges  Functional training, self care minutes (CPT 65067): 20 minutes        Assessment:     Functional impairments:  Decreased gaze stabilization    Assessment details:  Ms. Huff is a 59 y.o female who presents to PT with complaint of dizziness x 1 month.  PT evaluation is NEG for BPPV.  Her history, however, is consistent with an episode of vestibular neuritis.  Contributing factors may include described issues with managing her allergies this year, asthma since March and recent night post-nasal drainage/pressure. There is evidence of mild hypofunction with VORx1.  Otherwise balance is WNL at this time.  She has been established with a VRT HEP. Will follow up in 3 weeks to assess progress or sooner if sx relapse.    Skilled PT services are indicated to address the  mentioned functional limitations and enhance QOL.       Prognosis: good    Goals:     Short term goals:  - Indep with VRT HEP using HOs provided    Short term goal time span:  1-2 weeks    Long term goals:  - Improve VORx1 to 2hz for 1 min without dizziness    Long term goal time span:  6-8 weeks  Plan:     Therapy options:  Physical therapy treatment to continue    Planned therapy interventions:  Functional Training, Self Care (CPT 36996), Neuromuscular Re-education (CPT 55091), Therapeutic Exercise (CPT 99470) and Therapeutic Activities (CPT 75297)    Frequency:  1x week    Duration in weeks:  8    Discussed with:  Patient      Functional Assessment Used    DHI= 22%      Referring provider co-signature:  I have reviewed this plan of care and my co-signature certifies the need for services.    Certification Period: 09/02/2020 to  10/28/20    Physician Signature: ________________________________ Date: ______________

## 2020-09-02 ENCOUNTER — HOSPITAL ENCOUNTER (OUTPATIENT)
Dept: LAB | Facility: MEDICAL CENTER | Age: 60
End: 2020-09-02
Attending: FAMILY MEDICINE
Payer: COMMERCIAL

## 2020-09-02 ENCOUNTER — PHYSICAL THERAPY (OUTPATIENT)
Dept: PHYSICAL THERAPY | Facility: REHABILITATION | Age: 60
End: 2020-09-02
Attending: FAMILY MEDICINE
Payer: COMMERCIAL

## 2020-09-02 DIAGNOSIS — E03.9 HYPOTHYROIDISM, UNSPECIFIED TYPE: ICD-10-CM

## 2020-09-02 DIAGNOSIS — R42 DIZZINESS: ICD-10-CM

## 2020-09-02 DIAGNOSIS — R53.83 FATIGUE, UNSPECIFIED TYPE: ICD-10-CM

## 2020-09-02 LAB
ERYTHROCYTE [DISTWIDTH] IN BLOOD BY AUTOMATED COUNT: 39.2 FL (ref 35.9–50)
HCT VFR BLD AUTO: 45.4 % (ref 37–47)
HGB BLD-MCNC: 15.6 G/DL (ref 12–16)
MCH RBC QN AUTO: 32 PG (ref 27–33)
MCHC RBC AUTO-ENTMCNC: 34.4 G/DL (ref 33.6–35)
MCV RBC AUTO: 93 FL (ref 81.4–97.8)
PLATELET # BLD AUTO: 206 K/UL (ref 164–446)
PMV BLD AUTO: 10.3 FL (ref 9–12.9)
RBC # BLD AUTO: 4.88 M/UL (ref 4.2–5.4)
WBC # BLD AUTO: 4.7 K/UL (ref 4.8–10.8)

## 2020-09-02 PROCEDURE — 36415 COLL VENOUS BLD VENIPUNCTURE: CPT

## 2020-09-02 PROCEDURE — 97535 SELF CARE MNGMENT TRAINING: CPT

## 2020-09-02 PROCEDURE — 84443 ASSAY THYROID STIM HORMONE: CPT

## 2020-09-02 PROCEDURE — 85027 COMPLETE CBC AUTOMATED: CPT

## 2020-09-02 PROCEDURE — 80053 COMPREHEN METABOLIC PANEL: CPT

## 2020-09-02 PROCEDURE — 84481 FREE ASSAY (FT-3): CPT

## 2020-09-02 PROCEDURE — 97161 PT EVAL LOW COMPLEX 20 MIN: CPT

## 2020-09-02 PROCEDURE — 84439 ASSAY OF FREE THYROXINE: CPT

## 2020-09-02 ASSESSMENT — ENCOUNTER SYMPTOMS
PAIN SCALE AT LOWEST: 0
PAIN SCALE: 0
PAIN SCALE AT HIGHEST: 2

## 2020-09-03 LAB
ALBUMIN SERPL BCP-MCNC: 4.5 G/DL (ref 3.2–4.9)
ALBUMIN/GLOB SERPL: 1.7 G/DL
ALP SERPL-CCNC: 80 U/L (ref 30–99)
ALT SERPL-CCNC: 17 U/L (ref 2–50)
ANION GAP SERPL CALC-SCNC: 11 MMOL/L (ref 7–16)
AST SERPL-CCNC: 21 U/L (ref 12–45)
BILIRUB SERPL-MCNC: 0.3 MG/DL (ref 0.1–1.5)
BUN SERPL-MCNC: 16 MG/DL (ref 8–22)
CALCIUM SERPL-MCNC: 9.4 MG/DL (ref 8.5–10.5)
CHLORIDE SERPL-SCNC: 104 MMOL/L (ref 96–112)
CO2 SERPL-SCNC: 28 MMOL/L (ref 20–33)
CREAT SERPL-MCNC: 0.78 MG/DL (ref 0.5–1.4)
GLOBULIN SER CALC-MCNC: 2.7 G/DL (ref 1.9–3.5)
GLUCOSE SERPL-MCNC: 83 MG/DL (ref 65–99)
POTASSIUM SERPL-SCNC: 4.4 MMOL/L (ref 3.6–5.5)
PROT SERPL-MCNC: 7.2 G/DL (ref 6–8.2)
SODIUM SERPL-SCNC: 143 MMOL/L (ref 135–145)
T3FREE SERPL-MCNC: 3.08 PG/ML (ref 2–4.4)
T4 FREE SERPL-MCNC: 2.01 NG/DL (ref 0.93–1.7)
TSH SERPL DL<=0.005 MIU/L-ACNC: 0.79 UIU/ML (ref 0.38–5.33)

## 2020-09-04 ENCOUNTER — APPOINTMENT (OUTPATIENT)
Dept: PHYSICAL THERAPY | Facility: REHABILITATION | Age: 60
End: 2020-09-04
Attending: FAMILY MEDICINE
Payer: COMMERCIAL

## 2020-09-08 ENCOUNTER — APPOINTMENT (OUTPATIENT)
Dept: PHYSICAL THERAPY | Facility: REHABILITATION | Age: 60
End: 2020-09-08
Attending: FAMILY MEDICINE
Payer: COMMERCIAL

## 2020-09-10 ENCOUNTER — APPOINTMENT (OUTPATIENT)
Dept: PHYSICAL THERAPY | Facility: REHABILITATION | Age: 60
End: 2020-09-10
Attending: FAMILY MEDICINE
Payer: COMMERCIAL

## 2020-09-14 ENCOUNTER — APPOINTMENT (OUTPATIENT)
Dept: MEDICAL GROUP | Facility: PHYSICIAN GROUP | Age: 60
End: 2020-09-14
Payer: COMMERCIAL

## 2020-09-16 ENCOUNTER — TELEMEDICINE (OUTPATIENT)
Dept: PULMONOLOGY | Facility: HOSPICE | Age: 60
End: 2020-09-16
Payer: COMMERCIAL

## 2020-09-16 VITALS — WEIGHT: 108 LBS | BODY MASS INDEX: 21.2 KG/M2 | HEIGHT: 60 IN

## 2020-09-16 DIAGNOSIS — E03.9 HYPOTHYROIDISM, UNSPECIFIED TYPE: ICD-10-CM

## 2020-09-16 DIAGNOSIS — G20.C PARKINSONISM, UNSPECIFIED PARKINSONISM TYPE (HCC): ICD-10-CM

## 2020-09-16 DIAGNOSIS — R06.09 EXERTIONAL DYSPNEA: ICD-10-CM

## 2020-09-16 DIAGNOSIS — A37.90 PERTUSSIS: ICD-10-CM

## 2020-09-16 PROCEDURE — 99214 OFFICE O/P EST MOD 30 MIN: CPT | Mod: 95,CR | Performed by: INTERNAL MEDICINE

## 2020-09-16 ASSESSMENT — FIBROSIS 4 INDEX: FIB4 SCORE: 1.43

## 2020-09-16 NOTE — ASSESSMENT & PLAN NOTE
--Suspect postinfectious reactive airway disease following pertussis infection. Oximetry, spirometry, CXR and CT chest unremarkable, TTE was completely normal, as was ECG.  --Associated with chest tightness, dry cough, minimal relief with albuterol. Now having balance issues. No symptomatic improvement with Symbicort, Tessalon, chlorpheniramine  --Strong history of atopy/allergic symptoms, + sensitivity to Ab grass on testing previously followed by Dr. Copeland requiring allergy shots     Plan:  - Suspect balance issues and sinuses likely related. Counseled about sinus hygiene, will start twice daily saline lavage with neilmed sinus rinse, followed by Flonase  - Scheduled to see movement specialist in 10/2020 at [facility], when stabilized resume exercise regimen to rebuild endurance for deconditioning  -Continue vestibular therapy  -If symptoms improve with above ok to dc singulair, Zyrtec sequentially  -If no improvement, will consult to allergy

## 2020-09-16 NOTE — PROGRESS NOTES
Virtual Visit: Established Patient     This visit was conducted via Zoom using secure and encrypted videoconferencing technology. The patient was in a private location in the state of [place].    The patient's identity was confirmed and verbal consent was obtained for this virtual visit.    Subjective:   CC:   Chief Complaint   Patient presents with   • Follow-Up     Is having to use inhaler more frequently, some tightness in chest  - no SOB, no cough, no wheezing     Mariela Huff is a very pleasant 59 y.o. female never smoker who is followed by Abi Bragg M.D.  and returns to the pulmonary clinic for cough and chest tightness.      In 3/2020 had a URI/pertussis. She tested negative for COVID-19 on 3/28. Primary symptoms are chest tightness particularly in the mid morning and a dry cough with frequent throat throat clearing that is relieved after 2 puffs of albuterol.    She also has associated postnasal drip and occasional dizziness.  Prior to the onset of her symptoms she was very active, frequently biking and enjoys golfing.      She was prescribed a Medrol Dosepak which helped for several days but then the symptoms came right back.  She was also prescribed a Z-Tom by her PCP which also helped, but symptoms returned. She has been using albuterol inhaler multiple times a day with minimal relief.     CXR 3/22/2020 unremarkable.     She has a past medical history of chronic Lyme disease in 2014, as well as babesiosis, treated with over a year of antibiotics.  She also has a history of shoulder arthritis, diverticulosis, hypothyroidism on synthroid, and parkinsonism on Artane.    Interval events since last visit in 7/2020:  Encouraged to start a gradual exercise regimen to rebuild endurance for deconditioning. She previously was able to do a boxing exercise class for about 1 hour/day without coughing.      Unremarkable diagnostic work-up with normal chest imaging, spirometry, echo, walk test.  Exertional dyspnea felt to be due to protracted recovery from recent URI due to pertussis. Trialed on Symbicort, tessalon, chlorpheniramine with no symptomatic improvement, developed dysphonia with ICS/LABA.  Started Singulair last visit, advised to continue Zyrtec and albuterol, which she continues to use about 1x/day.  She feels very little relief.    Allergy testing + mirian grass; strong history of atopy/allergic symptoms, previously followed by Dr. Copeland requiring allergy shots.  She reports + sinus congestion postnasal drip, does not consistently use Flonase, does have a saline lavage at home but does not use consistently either.    She was seen by her PCP Dr. Bragg and referred to vestibular therapy for her dizziness with whom she made an additional evaluation earlier this month.  Their assessment is she may have vestibular neuritis.  She is also been referred to  [facility] by her neurologist for her Parkinson's.    ROS   Denies any recent fevers or chills. No nausea or vomiting. No chest pains or shortness of breath.     Allergies   Allergen Reactions   • Pcn [Penicillins] Rash     Rash     • Percocet [Oxycodone-Acetaminophen] Vomiting   • Symbicort [Budesonide-Formoterol Fumarate]        Current medicines (including changes today)  Current Outpatient Medications   Medication Sig Dispense Refill   • levothyroxine (SYNTHROID) 88 MCG Tab TAKE 1 TABLET EVERY MORNING ON AN EMPTY STOMACH 90 Tab 2   • Cetirizine HCl (ZYRTEC PO) Take  by mouth every day.     • albuterol 108 (90 Base) MCG/ACT Aero Soln inhalation aerosol Inhale 2 Puffs by mouth every 6 hours as needed for Shortness of Breath. 8.5 g 11   • montelukast (SINGULAIR) 10 MG Tab Take 1 Tab by mouth every evening. 30 Tab 11   • trihexyphenidyl (ARTANE) 2 MG Tab Take 1 Tab by mouth 3 times a day. 270 Tab 3   • Cyanocobalamin (B-12) 3000 MCG SL Tab Place 1 Tab under tongue.     • Ascorbic Acid (VITAMIN C) 500 MG Chew Tab Take 500 mg by mouth every day.     •  Cholecalciferol (VITAMIN D) 50 MCG (2000 UT) Cap Take 1 Cap by mouth.       No current facility-administered medications for this visit.        Patient Active Problem List    Diagnosis Date Noted   • Exertional dyspnea 04/25/2020     Priority: Medium   • Parkinsonism (HCC) 01/24/2018     Priority: Low   • Pertussis 05/19/2020   • Chest tightness 04/06/2020   • Arthralgia 02/28/2018   • Hypothyroidism 01/24/2018       Family History   Problem Relation Age of Onset   • Cancer Paternal Aunt         breast   • Diabetes Mother    • Thyroid Mother    • Diabetes Father    • Thyroid Daughter    • Thyroid Sister    • Cancer Paternal Aunt         breast, uterine   • Cancer Paternal Aunt         bladder   • Cancer Paternal Aunt         colon   • Heart Disease Neg Hx    • Stroke Neg Hx    • Alcohol/Drug Neg Hx        She  has a past medical history of Anesthesia, Arthritis, Chest tightness, Cough, Diverticulosis (2012), Hypothyroid, Infectious disease, Lyme disease, Pain (04/2018), Painful breathing, Parkinsonism (HCC) (1/24/2018), and Shortness of breath. She also has no past medical history of Chest pain, Difficulty breathing, Fainting, Palpitations, Sputum production, Swelling of lower extremity, or Wheezing.  She  has a past surgical history that includes hysterectomy, vaginal (1999); shoulder decompression arthroscopic (5/13/2009); rotator cuff repair (5/13/2009); other orthopedic surgery (1993); shoulder arthroscopy (12/1/2010); shoulder decompression (12/1/2010); rotator cuff repair (12/1/2010); breast biopsy (1/24/2012); pr enlarge breast with implant (Bilateral, 1999); us-needle core bx-breast panel; breast implant revision (Bilateral, 4/11/2016); bunionectomy (Right, 5/1/2017); neuroma excision (5/1/2017); toe fusion (Right, 5/7/2018); flexor tendon repair (Right, 5/7/2018); hardware removal ortho (Right, 5/7/2018); and orthopedic osteotomy (Right, 5/7/2018).       Objective:   Ht 1.524 m (5')   Wt 49 kg (108 lb)    BMI 21.09 kg/m²     Physical Exam:  Constitutional: Alert, no distress, well-groomed.  Skin: No rashes in visible areas.  Eye: Round. Conjunctiva clear, lids normal. No icterus.   ENMT: Lips pink without lesions, good dentition, moist mucous membranes. Phonation normal.  Neck: No masses, no thyromegaly. Moves freely without pain.  Respiratory: Unlabored respiratory effort, no cough or audible wheeze  Psych: Alert and oriented x3, normal affect and mood.       Assessment and Plan:   The following treatment plan was discussed:     Problem List Items Addressed This Visit     Exertional dyspnea     --Suspect postinfectious reactive airway disease following pertussis infection. Oximetry, spirometry, CXR and CT chest unremarkable, TTE was completely normal, as was ECG.  --Associated with chest tightness, dry cough, minimal relief with albuterol. Now having balance issues. No symptomatic improvement with Symbicort, Tessalon, chlorpheniramine  --Strong history of atopy/allergic symptoms, + sensitivity to Ab grass on testing previously followed by Dr. Copeland requiring allergy shots     Plan:  - Suspect balance issues and sinuses likely related. Counseled about sinus hygiene, will start twice daily saline lavage with neilmed sinus rinse, followed by Flonase  - Scheduled to see movement specialist in 10/2020 at [facility], when stabilized resume exercise regimen to rebuild endurance for deconditioning  -Continue vestibular therapy  -If symptoms improve with above ok to dc singulair, Zyrtec sequentially  -If no improvement, will consult to allergy         Hypothyroidism     TSH WNL  Continue current dose of levothyroxine         Pertussis     With protracted recovery, suspect postinfectious reactive airway syndrome         Parkinsonism (HCC)     On Artane  F/u [facility] specialist 10/2020             Follow-up: Return in about 8 weeks (around 11/11/2020).    This note was generated using voice recognition software which has a  chance of producing errors of grammar and content.  I have made every reasonable attempt to find and correct any errors, but it should be expected that some may not be found prior to finalization of this note.  __________  Chester Taylor MD  Pulmonary and Critical Care Medicine  [facility]

## 2020-09-17 ENCOUNTER — OFFICE VISIT (OUTPATIENT)
Dept: MEDICAL GROUP | Facility: PHYSICIAN GROUP | Age: 60
End: 2020-09-17
Payer: COMMERCIAL

## 2020-09-17 VITALS
OXYGEN SATURATION: 96 % | BODY MASS INDEX: 21.4 KG/M2 | HEART RATE: 75 BPM | WEIGHT: 109 LBS | SYSTOLIC BLOOD PRESSURE: 102 MMHG | DIASTOLIC BLOOD PRESSURE: 70 MMHG | RESPIRATION RATE: 14 BRPM | TEMPERATURE: 97.9 F | HEIGHT: 60 IN

## 2020-09-17 DIAGNOSIS — E03.9 HYPOTHYROIDISM, UNSPECIFIED TYPE: ICD-10-CM

## 2020-09-17 DIAGNOSIS — R14.0 BLOATING: Primary | ICD-10-CM

## 2020-09-17 DIAGNOSIS — Z23 NEED FOR VACCINATION: ICD-10-CM

## 2020-09-17 PROCEDURE — 90750 HZV VACC RECOMBINANT IM: CPT | Performed by: FAMILY MEDICINE

## 2020-09-17 PROCEDURE — 90471 IMMUNIZATION ADMIN: CPT | Performed by: FAMILY MEDICINE

## 2020-09-17 PROCEDURE — 99213 OFFICE O/P EST LOW 20 MIN: CPT | Mod: 25 | Performed by: FAMILY MEDICINE

## 2020-09-17 ASSESSMENT — FIBROSIS 4 INDEX: FIB4 SCORE: 1.43

## 2020-09-17 NOTE — ASSESSMENT & PLAN NOTE
This is an acute condition. She didn't get the H pylori. She did work on the FODMAP diet. She noted that dairy may be the culprit. She has noted cutting out dairy did help. So overall the bloating is improved, no longer a daily occurrence.

## 2020-09-17 NOTE — PROGRESS NOTES
Subjective:     CC: f/u labs    HPI:   Mariela presents today with     Bloating  This is an acute condition. She didn't get the H pylori. She did work on the FODMAP diet. She noted that dairy may be the culprit. She has noted cutting out dairy did help. So overall the bloating is improved, no longer a daily occurrence.     Hypothyroidism  This is a chronic condition. Recent labs were normal except for slightly elevated T4.       Past Medical History:   Diagnosis Date   • Anesthesia     severe ponv, hard to wake up   • Arthritis     shoulders   • Chest tightness    • Cough    • Diverticulosis 2012    pt stated it was noted on colonoscopy   • Hypothyroid     hypothyroid   • Infectious disease     around bronchitis/flu 12/2011   • Lyme disease     chronic   • Pain 04/2018    right foot, bilateral shoulder   • Painful breathing    • Parkinsonism (HCC) 1/24/2018   • Shortness of breath        Social History     Tobacco Use   • Smoking status: Never Smoker   • Smokeless tobacco: Never Used   Substance Use Topics   • Alcohol use: Yes     Alcohol/week: 0.6 oz     Types: 1 Glasses of wine per week     Comment: Occasionally   • Drug use: No       Current Outpatient Medications Ordered in Epic   Medication Sig Dispense Refill   • levothyroxine (SYNTHROID) 88 MCG Tab TAKE 1 TABLET EVERY MORNING ON AN EMPTY STOMACH 90 Tab 2   • Cetirizine HCl (ZYRTEC PO) Take  by mouth every day.     • albuterol 108 (90 Base) MCG/ACT Aero Soln inhalation aerosol Inhale 2 Puffs by mouth every 6 hours as needed for Shortness of Breath. 8.5 g 11   • montelukast (SINGULAIR) 10 MG Tab Take 1 Tab by mouth every evening. 30 Tab 11   • trihexyphenidyl (ARTANE) 2 MG Tab Take 1 Tab by mouth 3 times a day. 270 Tab 3   • Cyanocobalamin (B-12) 3000 MCG SL Tab Place 1 Tab under tongue.     • Ascorbic Acid (VITAMIN C) 500 MG Chew Tab Take 500 mg by mouth every day.     • Cholecalciferol (VITAMIN D) 50 MCG (2000 UT) Cap Take 1 Cap by mouth.       No current  Epic-ordered facility-administered medications on file.        Allergies:  Pcn [penicillins], Percocet [oxycodone-acetaminophen], and Symbicort [budesonide-formoterol fumarate]    Health Maintenance: Completed    ROS:  Gen: no fevers/chills  Pulm: no sob  GI: no nausea/vomiting    Objective:       Exam:  /70 (BP Location: Left arm, Patient Position: Sitting, BP Cuff Size: Adult)   Pulse 75   Temp 36.6 °C (97.9 °F)   Resp 14   Ht 1.524 m (5')   Wt 49.4 kg (109 lb)   SpO2 96%   BMI 21.29 kg/m²  Body mass index is 21.29 kg/m².    Gen: Alert and oriented, No apparent distress.  Neck: Neck is supple without lymphadenopathy.  Lungs: Normal effort, CTA bilaterally, no wheezes, rhonchi, or rales  CV: Regular rate and rhythm. No murmurs, rubs, or gallops.  Ext: No clubbing, cyanosis, edema.    Assessment & Plan:     59 y.o. female with the following -     1. Bloating  This is an acute condition, improved.  She states she never got the H. pylori test done as she was not given instructions when she was given the sample kit.  However, she is compliant with her diet and suspects that dairy has been leading to a lot of her symptoms as she has noticed an improvement while staying away from dairy.  She now no longer gets the bloating every day.  Therefore, I have encouraged her to continue working on finding food triggers for her symptoms and we can hold off on an H. pylori test for now.    2. Hypothyroidism, unspecified type  This is a chronic condition, stable.  She did mention some increased fatigue so we checked her thyroid labs to see if her dose needed to be adjusted.  Interestingly enough it actually showed her T4 was slightly elevated with a normal TSH.  Since the TSH is still normal we will change the dose at this time and reevaluate in 6 months.  However, her thyroid has nothing to do with the fatigue she has been feeling.  The rest of her lab work did not show any etiology for her fatigue either.  It is  likely related to stress.  -Continue levothyroxine 88 mcg daily  -Recheck TSH and T4 in 6 months    3. Need for vaccination  - Shingles Vaccine (Shingrix)    Return if symptoms worsen or fail to improve.    Please note that this dictation was created using voice recognition software. I have made every reasonable attempt to correct obvious errors, but I expect that there are errors of grammar and possibly content that I did not discover before finalizing the note.

## 2020-09-23 ENCOUNTER — TELEPHONE (OUTPATIENT)
Dept: PHYSICAL THERAPY | Facility: REHABILITATION | Age: 60
End: 2020-09-23

## 2020-09-23 NOTE — OP THERAPY DISCHARGE SUMMARY
Outpatient Physical Therapy  DISCHARGE SUMMARY NOTE      [facility] [address].  [address]  [place] [address]  Phone:  [phone]  Fax:  [phone]    Date of Visit: 09/23/2020    Patient: Mariela Huff  YOB: 1960  MRN: [id number]     Referring Provider: No referring provider defined for this encounter.   Referring Diagnosis No admission diagnoses are documented for this encounter.         Functional Assessment Used        Your patient is being discharged from Physical Therapy with the following comments:   · Goals met    Comments:  Ms. Huff was seen for eval of her dizziness.  Noted to be most consistent with vestibular hypofunction and VRT HEP initiated.  Pt called today to report the HEP is going well and she feels additional PT is no longer necessary.      Recommendations:  D/C per pt request.     Maxine Bennett, PT, DPT    Date: 9/23/2020

## 2020-09-25 ENCOUNTER — APPOINTMENT (OUTPATIENT)
Dept: PHYSICAL THERAPY | Facility: REHABILITATION | Age: 60
End: 2020-09-25
Attending: FAMILY MEDICINE
Payer: COMMERCIAL

## 2020-11-30 ENCOUNTER — NON-PROVIDER VISIT (OUTPATIENT)
Dept: MEDICAL GROUP | Facility: PHYSICIAN GROUP | Age: 60
End: 2020-11-30
Payer: COMMERCIAL

## 2020-11-30 DIAGNOSIS — Z23 IMMUNIZATION DUE: ICD-10-CM

## 2020-11-30 PROCEDURE — 90750 HZV VACC RECOMBINANT IM: CPT | Performed by: FAMILY MEDICINE

## 2020-11-30 PROCEDURE — 90471 IMMUNIZATION ADMIN: CPT | Performed by: FAMILY MEDICINE

## 2020-11-30 NOTE — PROGRESS NOTES
"Mariela Huff is a 60 y.o. female here for a non-provider visit for:   SHINGRIX (Shingles)    Reason for immunization: continue or complete series started at the office  Immunization records indicate need for vaccine: Yes, confirmed with Epic  Minimum interval has been met for this vaccine: Yes  ABN completed: Not Indicated    Order and dose verified by: Michel Boucher  VIS Dated  10/30/2019 was given to patient: Yes  All IAC Questionnaire questions were answered \"No.\"    Patient tolerated injection and no adverse effects were observed or reported: Yes    Pt scheduled for next dose in series: Not Indicated    "

## 2020-12-11 ENCOUNTER — OFFICE VISIT (OUTPATIENT)
Dept: NEUROLOGY | Facility: MEDICAL CENTER | Age: 60
End: 2020-12-11
Payer: COMMERCIAL

## 2020-12-11 VITALS
OXYGEN SATURATION: 97 % | SYSTOLIC BLOOD PRESSURE: 106 MMHG | WEIGHT: 111.55 LBS | TEMPERATURE: 99.1 F | HEART RATE: 84 BPM | DIASTOLIC BLOOD PRESSURE: 64 MMHG | BODY MASS INDEX: 21.9 KG/M2 | HEIGHT: 60 IN

## 2020-12-11 DIAGNOSIS — G20.C PRIMARY PARKINSONISM (HCC): ICD-10-CM

## 2020-12-11 PROCEDURE — 99214 OFFICE O/P EST MOD 30 MIN: CPT | Performed by: PSYCHIATRY & NEUROLOGY

## 2020-12-11 RX ORDER — TRIHEXYPHENIDYL HYDROCHLORIDE 2 MG/1
2 TABLET ORAL 3 TIMES DAILY
Qty: 90 TAB | Refills: 4 | Status: SHIPPED | OUTPATIENT
Start: 2020-12-11 | End: 2020-12-30 | Stop reason: SDUPTHER

## 2020-12-11 ASSESSMENT — FIBROSIS 4 INDEX: FIB4 SCORE: 1.46

## 2020-12-11 ASSESSMENT — ENCOUNTER SYMPTOMS
FALLS: 0
MEMORY LOSS: 0
HALLUCINATIONS: 0
TREMORS: 1
FOCAL WEAKNESS: 0
INSOMNIA: 0

## 2020-12-11 NOTE — PROGRESS NOTES
"Subjective:      Mariela uHff is a 60 y.o. female who presents for follow-up, with a history of Parkinson's disease, now status post reevaluation at the [facility].     SOPHIE Gold states that her symptoms actually have improved, now on a low-dose Sinemet.  Still on Artane 2 mg, 3 times daily, this had provide some benefit with the stiffness proximally at the shoulder and hip on the right.  Tremor itself was also slightly improved but again all of this was suboptimal and benefit.    Seen by the movement disorder specialists at [facility] recently, it was then impressions that she did suffer from Parkinson's disease, not necessarily an atypical form, and there were no red flags suggestive of a Parkinson's-plus syndrome.  It was recommended she continue Artane, they added Sinemet 25/100, 0.5 tablet 3 times daily.  They talked about Botox for the left lower extremity dystonia.  LSVT BIG directed physical therapy was also recommended.    On her present regimen, she felt a little \"weird\" and \"off\" with a 3 times daily regimen of her Sinemet, she also was began to notice left lower extremity dyskinesias in the feet.  The dystonia she already has been suffering from did not worsen, but it also did not improve much.  She denies wearing-off with the twice daily regimen, walking is better and easier, tremor slightly improved as well.  Moving the right leg is a little bit more spontaneous though if she walks too quickly, she can begin to trip up.    She denies any cognitive symptoms or sleep distortions.  Voice volume is still softened but clear, she does not drool, food goes down the right tube when she swallows.  There are no motor symptoms involving the left upper or lower extremity.    Medical, surgical and family histories are reviewed, there are no new drug allergies.  Socially, she did recently have to put her 16-year-old dog down, always depressing.  She is on Artane 2 mg, 3 times " daily, Sinemet 25/100, 0.5 tab twice daily, her inhalers, Synthroid, vitamin B12 and D supplements.    Review of Systems   Constitutional: Negative for malaise/fatigue.   Cardiovascular: Negative for leg swelling.   Musculoskeletal: Negative for falls.   Neurological: Positive for tremors. Negative for focal weakness.   Psychiatric/Behavioral: Negative for hallucinations and memory loss. The patient does not have insomnia.    All other systems reviewed and are negative.       Objective:     /64 (BP Location: Right arm, Patient Position: Sitting, BP Cuff Size: Adult)   Pulse 84   Temp 37.3 °C (99.1 °F) (Temporal)   Ht 1.524 m (5')   Wt 50.6 kg (111 lb 8.8 oz)   SpO2 97%   BMI 21.79 kg/m²      Physical Exam    She appears in no acute distress.  Vital signs are stable.  There is no malar rash or sialorrhea.  Her neck is supple, range of motion is full.  Cardiac evaluation reveals a regular rhythm.    As always, fully oriented, there is no evidence of focal cognitive or language deficit.    PERRLA/EOMI, eye blink frequency is still diminished, hypophonia is about the same level, there is no tachyphemia or dysarthria.  Glabellar tap is absent.  Visual fields are full.  Facial movements are symmetric, sensory exam is intact to temperature.  The tongue and uvula are midline.  Shoulder shrug is full though slightly slower on the right.    Musculoskeletal exam again reveals mild rigidity with right upper extremity, there is no tremor on today's exam.  Bradykinesia is the same.  There is no drift or asterixis, strength is intact in all 4 extremities.    She does stand a bit more easily, there is less hesitancy on gait initiation, stride length is now almost symmetric, station is, as always, normal.  Arm swing on the right is now almost equal in amplitude to the left.  There is no appendicular dystaxia.  Repetitive movements are still diminished in amplitude bilaterally, frequencies are closer to normal.  Foot  "tapping on the right is more notably curtailed when compared to the left.    Sensory exam is deferred.     Assessment/Plan:     1. Primary Parkinsonism (HCC)  I am happy to know that the diagnosis was confirmed, consistency established with several different specialists evaluating her for her symptoms.  I am also happy to know that Sinemet is helping her, even at a very low dose, and given her sensitivity to the dopamine itself, increasing will be problematic at best.    Since she is being treated by a movement disorder specialist, I am going to be sitting \"in the backseat\" and will defer primary treatment recommendations to them.  I will order LSVT BIG for therapy.  We talked about the nature of this and why seems to be so effective.  She already has a kickboxing class that she attends on a regular basis which is helping immensely.  I agreed that Botox might be a very valuable treatment for her, she will be talking a greater length with the specialists at [facility].    She and I can follow-up in about 6 months, she is scheduled to follow-up with the [facility] Movement Disorder team early next year.    - REFERRAL TO PHYSICAL THERAPY  - carbidopa-levodopa (SINEMET)  MG Tab; Take 0.5 Tabs by mouth 3 times a day.  Dispense: 90 Tab; Refill: 5    Time: 25 minutes spent face-to-face for exam, review, discussion, and education, of this over 50% of the time spent counseling and coordinating care.    "

## 2020-12-14 NOTE — PROCEDURE: ADDITIONAL NOTES
[FreeTextEntry1] :  The patient presents here for cardiac reevaluation. \par \par No clinical occurrence of Afib\par \par His major medical problem at this time has been PMR.Still requiring low dose steroid therapy for this. \par Does not exercise but keeps active with hunting. Limited by bad knees,\par \par S/P CABG 6  years ago.He denies any shortness of breath, palpitations,  PND, orthopnea, or edema.\par \par Blood work, stress test and carotids will be discussed. 
Detail Level: Simple
Additional Notes: Verbal consent given

## 2020-12-29 ENCOUNTER — TELEPHONE (OUTPATIENT)
Dept: MEDICAL GROUP | Facility: PHYSICIAN GROUP | Age: 60
End: 2020-12-29

## 2020-12-29 NOTE — TELEPHONE ENCOUNTER
She is not due for labs until late March, early April. I will send her a [employer] message when they have been ordered.

## 2020-12-29 NOTE — TELEPHONE ENCOUNTER
Patient LVM stating her  received a notice to complete labs prior to January appointmnet - she is asking if she needs to complete labs?

## 2020-12-29 NOTE — TELEPHONE ENCOUNTER
Phone Number Called: [phone] (home)     Call outcome: Spoke to patient regarding message below.    Message: Pt informed, Pt understood.  Verified for Pt if spouse would need to fast for his labs.  She is authorized to receive information on him.  No further questions at this time.

## 2020-12-30 DIAGNOSIS — G20.C PRIMARY PARKINSONISM (HCC): ICD-10-CM

## 2020-12-30 RX ORDER — TRIHEXYPHENIDYL HYDROCHLORIDE 2 MG/1
2 TABLET ORAL 3 TIMES DAILY
Qty: 90 TAB | Refills: 4 | Status: SHIPPED | OUTPATIENT
Start: 2020-12-30 | End: 2021-03-26

## 2020-12-30 NOTE — TELEPHONE ENCOUNTER
Received request via: Patient    Was the patient seen in the last year in this department? Yes    Does the patient have an active prescription (recently filled or refills available) for medication(s) requested? Yes.    [facility] . [facility] not  [facility] from [facility]

## 2021-01-12 SDOH — ECONOMIC STABILITY: HOUSING INSECURITY
IN THE LAST 12 MONTHS, WAS THERE A TIME WHEN YOU DID NOT HAVE A STEADY PLACE TO SLEEP OR SLEPT IN A SHELTER (INCLUDING NOW)?: NO

## 2021-01-12 SDOH — ECONOMIC STABILITY: HOUSING INSECURITY: IN THE LAST 12 MONTHS, HOW MANY PLACES HAVE YOU LIVED?: 1

## 2021-01-12 SDOH — HEALTH STABILITY: PHYSICAL HEALTH: ON AVERAGE, HOW MANY DAYS PER WEEK DO YOU ENGAGE IN MODERATE TO STRENUOUS EXERCISE (LIKE A BRISK WALK)?: 5 DAYS

## 2021-01-12 SDOH — ECONOMIC STABILITY: TRANSPORTATION INSECURITY
IN THE PAST 12 MONTHS, HAS THE LACK OF TRANSPORTATION KEPT YOU FROM MEDICAL APPOINTMENTS OR FROM GETTING MEDICATIONS?: NO

## 2021-01-12 SDOH — ECONOMIC STABILITY: INCOME INSECURITY: IN THE LAST 12 MONTHS, WAS THERE A TIME WHEN YOU WERE NOT ABLE TO PAY THE MORTGAGE OR RENT ON TIME?: NO

## 2021-01-12 SDOH — HEALTH STABILITY: MENTAL HEALTH
STRESS IS WHEN SOMEONE FEELS TENSE, NERVOUS, ANXIOUS, OR CAN'T SLEEP AT NIGHT BECAUSE THEIR MIND IS TROUBLED. HOW STRESSED ARE YOU?: NOT AT ALL

## 2021-01-12 SDOH — ECONOMIC STABILITY: TRANSPORTATION INSECURITY
IN THE PAST 12 MONTHS, HAS LACK OF RELIABLE TRANSPORTATION KEPT YOU FROM MEDICAL APPOINTMENTS, MEETINGS, WORK OR FROM GETTING THINGS NEEDED FOR DAILY LIVING?: NO

## 2021-01-12 SDOH — HEALTH STABILITY: PHYSICAL HEALTH: ON AVERAGE, HOW MANY MINUTES DO YOU ENGAGE IN EXERCISE AT THIS LEVEL?: 50 MINUTES

## 2021-01-12 ASSESSMENT — SOCIAL DETERMINANTS OF HEALTH (SDOH)
HOW OFTEN DO YOU HAVE SIX OR MORE DRINKS ON ONE OCCASION: NEVER
WITHIN THE PAST 12 MONTHS, THE FOOD YOU BOUGHT JUST DIDN'T LAST AND YOU DIDN'T HAVE MONEY TO GET MORE: NEVER TRUE
IN A TYPICAL WEEK, HOW MANY TIMES DO YOU TALK ON THE PHONE WITH FAMILY, FRIENDS, OR NEIGHBORS?: MORE THAN THREE TIMES A WEEK
DO YOU BELONG TO ANY CLUBS OR ORGANIZATIONS SUCH AS CHURCH GROUPS UNIONS, FRATERNAL OR ATHLETIC GROUPS, OR SCHOOL GROUPS?: YES
HOW OFTEN DO YOU HAVE A DRINK CONTAINING ALCOHOL: 2-4 TIMES A MONTH
HOW HARD IS IT FOR YOU TO PAY FOR THE VERY BASICS LIKE FOOD, HOUSING, MEDICAL CARE, AND HEATING?: NOT HARD AT ALL
HOW OFTEN DO YOU GET TOGETHER WITH FRIENDS OR RELATIVES?: ONCE A WEEK
HOW OFTEN DO YOU ATTENT MEETINGS OF THE CLUB OR ORGANIZATION YOU BELONG TO?: MORE THAN 4 TIMES PER YEAR
HOW OFTEN DO YOU ATTEND CHURCH OR RELIGIOUS SERVICES?: DECLINE
HOW MANY DRINKS CONTAINING ALCOHOL DO YOU HAVE ON A TYPICAL DAY WHEN YOU ARE DRINKING: 1 OR 2
WITHIN THE PAST 12 MONTHS, YOU WORRIED THAT YOUR FOOD WOULD RUN OUT BEFORE YOU GOT THE MONEY TO BUY MORE: NEVER TRUE

## 2021-01-13 ENCOUNTER — OFFICE VISIT (OUTPATIENT)
Dept: MEDICAL GROUP | Facility: PHYSICIAN GROUP | Age: 61
End: 2021-01-13
Payer: COMMERCIAL

## 2021-01-13 VITALS
OXYGEN SATURATION: 97 % | RESPIRATION RATE: 16 BRPM | BODY MASS INDEX: 21.68 KG/M2 | WEIGHT: 110.4 LBS | HEART RATE: 98 BPM | DIASTOLIC BLOOD PRESSURE: 74 MMHG | SYSTOLIC BLOOD PRESSURE: 108 MMHG | TEMPERATURE: 98.6 F | HEIGHT: 60 IN

## 2021-01-13 DIAGNOSIS — R06.09 EXERTIONAL DYSPNEA: ICD-10-CM

## 2021-01-13 DIAGNOSIS — R09.81 NASAL CONGESTION: ICD-10-CM

## 2021-01-13 DIAGNOSIS — Z00.00 WELLNESS EXAMINATION: Primary | ICD-10-CM

## 2021-01-13 DIAGNOSIS — Z23 NEED FOR VACCINATION: ICD-10-CM

## 2021-01-13 PROCEDURE — 99396 PREV VISIT EST AGE 40-64: CPT | Mod: 25 | Performed by: FAMILY MEDICINE

## 2021-01-13 PROCEDURE — 90471 IMMUNIZATION ADMIN: CPT | Performed by: FAMILY MEDICINE

## 2021-01-13 PROCEDURE — 90732 PPSV23 VACC 2 YRS+ SUBQ/IM: CPT | Performed by: FAMILY MEDICINE

## 2021-01-13 ASSESSMENT — FIBROSIS 4 INDEX: FIB4 SCORE: 1.46

## 2021-01-13 ASSESSMENT — PATIENT HEALTH QUESTIONNAIRE - PHQ9: CLINICAL INTERPRETATION OF PHQ2 SCORE: 0

## 2021-01-13 NOTE — PROGRESS NOTES
Subjective:     CC:   Chief Complaint   Patient presents with   • Annual Exam     HPI:   Mariela Huff is a 59 y.o. female who presents for annual exam. She is feeling well and denies any complaints.    Ob-Gyn/ History:    Patient has GYN provider: no  /Para:  2/2  Last Pap Smear:  unknown. No history of abnormal pap smears.  Gyn Surgery:  hysterectomy.  Current Contraceptive Method:  N/a. Yes currently sexually active.  Last menstrual period:  N/a.  No significant bloating/fluid retention, pelvic pain, or dyspareunia. No vaginal discharge  Post-menopausal bleeding: no  Urinary incontinence: no  Folate intake: n/a     Health Maintenance  Advanced directive: n/a   Osteoporosis Screen/ DEXA: n/a   PT/vit D for falls prevention: n/a   Cholesterol Screenin2019 - T chol 189, TG 64, HDL 76, ; ASCVD 2%   Diabetes Screening: due  Aspirin Use: n/a    Diet: healthy  Exercise: boxing class 4-5 times/week   Substance Abuse: no   Seat belts, bike helmet, gun safety discussed.  Sun protection used.    Cancer screening  Colorectal Cancer Screening: due     Lung Cancer Screening: n/a - never smoker  Cervical Cancer Screening: n/a - s/p hysterectomy  Breast Cancer Screening: due 2021     Infectious disease screening/Immunizations  --STI Screening: declined   --Practices safe sex.  --HIV Screening: declined   --Hepatitis C Screening: completed - negative   --Immunizations:    Influenza: deferred    HPV:  n/a    Tetanus: due     Shingles: completed   Pneumococcal : ordered   Other immunizations: n/a     She  has a past medical history of Anesthesia, Arthritis, Chest tightness, Cough, Diverticulosis (), Hypothyroid, Infectious disease, Lyme disease, Pain (2018), Painful breathing, Parkinsonism (HCC) (2018), and Shortness of breath. She also has no past medical history of Chest pain, Difficulty breathing, Fainting, Palpitations, Sputum production, Swelling of lower extremity, or  Wheezing.  She  has a past surgical history that includes hysterectomy, vaginal (1999); shoulder decompression arthroscopic (5/13/2009); rotator cuff repair (5/13/2009); other orthopedic surgery (1993); shoulder arthroscopy (12/1/2010); shoulder decompression (12/1/2010); rotator cuff repair (12/1/2010); breast biopsy (1/24/2012); pr breast augmentation with implant (Bilateral, 1999); us-needle core bx-breast panel; breast implant revision (Bilateral, 4/11/2016); bunionectomy (Right, 5/1/2017); neuroma excision (5/1/2017); toe fusion (Right, 5/7/2018); flexor tendon repair (Right, 5/7/2018); hardware removal ortho (Right, 5/7/2018); and orthopedic osteotomy (Right, 5/7/2018).    Family History   Problem Relation Age of Onset   • Cancer Paternal Aunt         breast   • Diabetes Mother    • Thyroid Mother    • Diabetes Father    • Thyroid Daughter    • Thyroid Sister    • Cancer Paternal Aunt         breast, uterine   • Cancer Paternal Aunt         bladder   • Cancer Paternal Aunt         colon   • Heart Disease Neg Hx    • Stroke Neg Hx    • Alcohol/Drug Neg Hx        Social History     Socioeconomic History   • Marital status:      Spouse name: Not on file   • Number of children: Not on file   • Years of education: Not on file   • Highest education level: Bachelor's degree (e.g., BA, AB, BS)   Occupational History   • Not on file   Social Needs   • Financial resource strain: Not hard at all   • Food insecurity     Worry: Never true     Inability: Never true   • Transportation needs     Medical: No     Non-medical: No   Tobacco Use   • Smoking status: Never Smoker   • Smokeless tobacco: Never Used   Substance and Sexual Activity   • Alcohol use: Yes     Alcohol/week: 0.6 oz     Types: 1 Glasses of wine per week     Frequency: 2-4 times a month     Drinks per session: 1 or 2     Binge frequency: Never     Comment: Occasionally   • Drug use: No   • Sexual activity: Yes     Partners: Male   Lifestyle   • Physical  activity     Days per week: 5 days     Minutes per session: 50 min   • Stress: Not at all   Relationships   • Social connections     Talks on phone: More than three times a week     Gets together: Once a week     Attends Mosque service: Patient refused     Active member of club or organization: Yes     Attends meetings of clubs or organizations: More than 4 times per year     Relationship status:    • Intimate partner violence     Fear of current or ex partner: Not on file     Emotionally abused: Not on file     Physically abused: Not on file     Forced sexual activity: Not on file   Other Topics Concern   • Not on file   Social History Narrative   • Not on file       Patient Active Problem List    Diagnosis Date Noted   • Exertional dyspnea 04/25/2020     Priority: Medium   • Bloating 09/17/2020   • Pertussis 05/19/2020   • Chest tightness 04/06/2020   • Arthralgia 02/28/2018   • Hypothyroidism 01/24/2018         Current Outpatient Medications   Medication Sig Dispense Refill   • trihexyphenidyl (ARTANE) 2 MG Tab Take 1 Tab by mouth 3 times a day. 90 Tab 4   • carbidopa-levodopa (SINEMET)  MG Tab Take 0.5 Tabs by mouth 3 times a day. 90 Tab 5   • levothyroxine (SYNTHROID) 88 MCG Tab TAKE 1 TABLET EVERY MORNING ON AN EMPTY STOMACH 90 Tab 2   • Cetirizine HCl (ZYRTEC PO) Take  by mouth every day.     • albuterol 108 (90 Base) MCG/ACT Aero Soln inhalation aerosol Inhale 2 Puffs by mouth every 6 hours as needed for Shortness of Breath. 8.5 g 11   • montelukast (SINGULAIR) 10 MG Tab Take 1 Tab by mouth every evening. 30 Tab 11   • Cyanocobalamin (B-12) 3000 MCG SL Tab Place 1 Tab under tongue.     • Ascorbic Acid (VITAMIN C) 500 MG Chew Tab Take 500 mg by mouth every day.     • Cholecalciferol (VITAMIN D) 50 MCG (2000 UT) Cap Take 1 Cap by mouth.       No current facility-administered medications for this visit.      Allergies   Allergen Reactions   • Pcn [Penicillins] Rash     Rash     • Percocet  [Oxycodone-Acetaminophen] Vomiting   • Symbicort [Budesonide-Formoterol Fumarate]      Laryngitis        Review of Systems   Constitutional: Negative for fever, chills.   HENT: Positive for congestion - chronic.    Eyes: Negative for pain.   Respiratory: Negative for cough.  Cardiovascular: Negative for leg swelling.   Gastrointestinal: Negative for nausea, vomiting.   Genitourinary: Negative for dysuria.   Skin: Negative for rash.   Neurological: Negative for dizziness.   Endo/Heme/Allergies: Does not bruise/bleed easily.   Psychiatric/Behavioral: Negative for depression.  The patient is not nervous/anxious.     Objective:     /74 (BP Location: Left arm, Patient Position: Sitting, BP Cuff Size: Adult)   Pulse 98   Temp 37 °C (98.6 °F) (Temporal)   Resp 16   Ht 1.524 m (5')   Wt 50.1 kg (110 lb 6.4 oz)   SpO2 97%   BMI 21.56 kg/m²   Body mass index is 21.56 kg/m².  Wt Readings from Last 4 Encounters:   01/13/21 50.1 kg (110 lb 6.4 oz)   12/11/20 50.6 kg (111 lb 8.8 oz)   09/17/20 49.4 kg (109 lb)   09/16/20 49 kg (108 lb)       Physical Exam:  Constitutional: Well-developed and well-nourished. Not diaphoretic. No distress.   Skin: Skin is warm and dry. No rash noted.  Head: Atraumatic without lesions.  Eyes: Conjunctivae and extraocular motions are normal. Pupils are equal, round, and reactive to light. No scleral icterus.   Ears:  External ears unremarkable. Tympanic membranes clear and intact.  Mouth/Throat: Dentition is good. Tongue normal. Oropharynx is clear and moist. Posterior pharynx without erythema or exudates.  Neck: Supple, trachea midline. Normal range of motion. No thyromegaly present. No lymphadenopathy--cervical or supraclavicular.  Cardiovascular: Regular rate and rhythm, S1 and S2 without murmur, rubs, or gallops.  Lungs: Normal inspiratory effort, CTA bilaterally, no wheezes/rhonchi/rales  Abdomen: Soft, non tender, and without distention. Active bowel sounds in all four quadrants. No  rebound, guarding, masses or HSM.  Extremities: No cyanosis, clubbing, erythema, nor edema. Distal pulses intact and symmetric.   Musculoskeletal: All major joints AROM full in all directions without pain.  Neurological: Alert and oriented x 3. DTRs 2+/3 and symmetric. No cranial nerve deficit. 5/5 myotomes. Sensation intact.   Psychiatric:  Behavior, mood, and affect are appropriate.    Assessment and Plan:     1. Wellness examination     2. Need for vaccination  PneumoVax PPV23 =>3yo   3. Exertional dyspnea  REFERRAL TO ALLERGY   4. Nasal congestion  REFERRAL TO ALLERGY        HCM:  Up to date   Labs per orders  Immunizations per orders  Patient counseled about skin care, diet, supplements, prenatal vitamins, safe sex and exercise.    Exertional dyspnea/nasal congestion  She continues to get intermittent exertional dyspnea.  She has been following with pulmonology but she reports no etiology has been determined at this time.  She is interested in seeing allergy in case that they can contribute and determine the cause of her shortness of breath.  Referral has been placed.    Follow-up: Return in about 1 year (around 1/13/2022) for Annual/wellness visit.

## 2021-03-17 DIAGNOSIS — E03.9 HYPOTHYROIDISM, UNSPECIFIED TYPE: ICD-10-CM

## 2021-03-17 DIAGNOSIS — R06.09 EXERTIONAL DYSPNEA: ICD-10-CM

## 2021-03-17 NOTE — PROGRESS NOTES
T4 was elevated with a normal TSH in September, 2020.  Regular repeat these labs to see if the dosing of her medication needs to change.

## 2021-03-26 DIAGNOSIS — G20.C PRIMARY PARKINSONISM (HCC): ICD-10-CM

## 2021-03-26 RX ORDER — TRIHEXYPHENIDYL HYDROCHLORIDE 2 MG/1
TABLET ORAL
Qty: 90 TABLET | Refills: 11 | Status: SHIPPED | OUTPATIENT
Start: 2021-03-26 | End: 2021-06-11 | Stop reason: SDUPTHER

## 2021-03-31 ENCOUNTER — HOSPITAL ENCOUNTER (OUTPATIENT)
Dept: LAB | Facility: MEDICAL CENTER | Age: 61
End: 2021-03-31
Attending: FAMILY MEDICINE
Payer: COMMERCIAL

## 2021-03-31 DIAGNOSIS — E03.9 HYPOTHYROIDISM, UNSPECIFIED TYPE: ICD-10-CM

## 2021-03-31 LAB
T4 FREE SERPL-MCNC: 1.86 NG/DL (ref 0.93–1.7)
TSH SERPL DL<=0.005 MIU/L-ACNC: 0.38 UIU/ML (ref 0.38–5.33)

## 2021-03-31 PROCEDURE — 84443 ASSAY THYROID STIM HORMONE: CPT

## 2021-03-31 PROCEDURE — 36415 COLL VENOUS BLD VENIPUNCTURE: CPT

## 2021-03-31 PROCEDURE — 84439 ASSAY OF FREE THYROXINE: CPT

## 2021-04-05 ENCOUNTER — OFFICE VISIT (OUTPATIENT)
Dept: MEDICAL GROUP | Facility: PHYSICIAN GROUP | Age: 61
End: 2021-04-05
Payer: COMMERCIAL

## 2021-04-05 VITALS
HEART RATE: 96 BPM | SYSTOLIC BLOOD PRESSURE: 108 MMHG | RESPIRATION RATE: 16 BRPM | TEMPERATURE: 98 F | HEIGHT: 60 IN | DIASTOLIC BLOOD PRESSURE: 70 MMHG | WEIGHT: 110 LBS | OXYGEN SATURATION: 98 % | BODY MASS INDEX: 21.6 KG/M2

## 2021-04-05 DIAGNOSIS — E03.9 HYPOTHYROIDISM, UNSPECIFIED TYPE: ICD-10-CM

## 2021-04-05 DIAGNOSIS — R05.8 DRY COUGH: ICD-10-CM

## 2021-04-05 DIAGNOSIS — R13.10 DYSPHAGIA, UNSPECIFIED TYPE: ICD-10-CM

## 2021-04-05 DIAGNOSIS — R06.09 EXERTIONAL DYSPNEA: Primary | ICD-10-CM

## 2021-04-05 PROCEDURE — 99213 OFFICE O/P EST LOW 20 MIN: CPT | Performed by: FAMILY MEDICINE

## 2021-04-05 RX ORDER — FAMOTIDINE 20 MG/1
20 TABLET, FILM COATED ORAL 2 TIMES DAILY
Qty: 120 TABLET | Refills: 0 | Status: SHIPPED | OUTPATIENT
Start: 2021-04-05 | End: 2021-04-05

## 2021-04-05 RX ORDER — FAMOTIDINE 20 MG/1
20 TABLET, FILM COATED ORAL 2 TIMES DAILY
Qty: 120 TABLET | Refills: 0 | Status: SHIPPED | OUTPATIENT
Start: 2021-04-05 | End: 2021-06-01

## 2021-04-05 RX ORDER — ONABOTULINUMTOXINA 100 [USP'U]/1
100 INJECTION, POWDER, LYOPHILIZED, FOR SOLUTION INTRADERMAL; INTRAMUSCULAR
COMMUNITY
Start: 2021-03-08 | End: 2022-01-14

## 2021-04-05 ASSESSMENT — FIBROSIS 4 INDEX: FIB4 SCORE: 1.46

## 2021-04-05 NOTE — ASSESSMENT & PLAN NOTE
This is a chronic condition.  She is currently on levothyroxine 88 mcg.  Labs in September showed a normal TSH but slightly elevated T4.  We kept her to current dose and repeat labs done a week ago showed low normal TSH and still slightly elevated T4 however downtrending.

## 2021-04-05 NOTE — ASSESSMENT & PLAN NOTE
This is a chronic condition.  She reports for years she has had intermittent episodes of difficulty swallowing.  Watermelon is the one food that triggers it the most often and it feels like it gets stuck near the suprasternal notch.  Sometimes drinking water does not was help and is eating and of the bite will get everything down.  She also notices difficulty with pills sometimes.  Overall it has not changed.

## 2021-04-05 NOTE — PROGRESS NOTES
Subjective:     CC: discuss potential acid reflux    HPI:   Mariela presents today with     Hypothyroidism  This is a chronic condition.  She is currently on levothyroxine 88 mcg.  Labs in September showed a normal TSH but slightly elevated T4.  We kept her to current dose and repeat labs done a week ago showed low normal TSH and still slightly elevated T4 however downtrending.    Dysphagia  This is a chronic condition.  She reports for years she has had intermittent episodes of difficulty swallowing.  Watermelon is the one food that triggers it the most often and it feels like it gets stuck near the suprasternal notch.  Sometimes drinking water does not was help and is eating and of the bite will get everything down.  She also notices difficulty with pills sometimes.  Overall it has not changed.    Exertional dyspnea  Seen pulmonology and testing all normal. 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 She was supposed to use azelastine but never sent to pharmacy. She dropped zyrtec and montelukast with no change in symptoms. She has been doing research herself and wonders if acid reflux is contributing. She reports a longstanding dry cough. She also notes pain between the shoulder blades when shortness of breath occurs. She denies burning pain in chest or throat, however she is hoarse frequently and has to clear her throat.     Current Outpatient Medications Ordered in Epic   Medication Sig Dispense Refill   • onabotulinum toxin type A (BOTOX) 100 UNIT Recon Soln Inject 100 Units into the shoulder, thigh, or buttocks. Inject 100 Units into the muscle one time every 3 months.     • famotidine (PEPCID) 20 MG Tab Take 1 tablet by mouth 2 times a day. 120 tablet 0   • trihexyphenidyl (ARTANE) 2 MG Tab TAKE 1 TABLET THREE TIMES A DAY 90 tablet 11   • carbidopa-levodopa (SINEMET)  MG Tab Take 0.5 Tabs by mouth 3 times a day. (Patient taking differently: Take 0.5 Tablets by  mouth 3 times a day. EXTENDED RELEASE) 90 Tab 5   • levothyroxine (SYNTHROID) 88 MCG Tab TAKE 1 TABLET EVERY MORNING ON AN EMPTY STOMACH 90 Tab 2   • Cyanocobalamin (B-12) 3000 MCG SL Tab Place 1 Tab under tongue.     • Ascorbic Acid (VITAMIN C) 500 MG Chew Tab Take 500 mg by mouth every day.     • Cholecalciferol (VITAMIN D) 50 MCG (2000 UT) Cap Take 1 Cap by mouth.       No current Epic-ordered facility-administered medications on file.       Health Maintenance: Completed    ROS:  Gen: no fevers/chills  CV: no chest pain  GI: no nausea/vomiting      Objective:     Exam:  /70 (BP Location: Left arm, Patient Position: Sitting, BP Cuff Size: Adult)   Pulse 96   Temp 36.7 °C (98 °F) (Temporal)   Resp 16   Ht 1.524 m (5')   Wt 49.9 kg (110 lb)   SpO2 98%   BMI 21.48 kg/m²  Body mass index is 21.48 kg/m².    Gen: Alert and oriented, No apparent distress.  Neck: Neck is supple without lymphadenopathy.  Lungs: Normal effort, CTA bilaterally, no wheezes, rhonchi, or rales  CV: Regular rate and rhythm. No murmurs, rubs, or gallops.  Ext: No clubbing, cyanosis, edema.    Assessment & Plan:     60 y.o. female with the following -     1. Exertional dyspnea  2. Dry cough  This is a chronic condition.  For more than a year she has had exertional dyspnea.  She is following with pulmonology reports she is done multiple studies that have all been normal.  She is seen allergy and she did test allergic to grasses so they recommended Flonase, Zyrtec, montelukast, azelastine, and Pulmicort.  She is not on the Pulmicort and the azelastine was never sent to the pharmacy.  She stopped taking the montelukast and Zyrtec and notes no change in her symptoms.  She does note voice hoarseness frequently, increased mucus production, frequent dry cough and after do some research she is wondering if she is having silent acid reflux which is certainly possible since allergy and pulmonology have not been able to determine the source of  her symptoms.  We will try acid suppressive therapy to see if it improves her symptoms.  -Famotidine 20 mg twice daily x8 weeks    3. Dysphagia, unspecified type  This is a chronic condition.  She has for years she will intermittently have episodes of feeling food gets stuck in the suprasternal notch.  Watermelon triggers at the most often.  Sometimes it occurs with pills and drinking water does not always help with symptoms she is to take another bite of something in order to get everything to go down.  We will go ahead and treat with acid suppressive therapy but if there is no improvement in her symptoms at her next visit then we will send her to GI for further evaluation.    4. Hypothyroidism, unspecified type  This is a chronic condition.  Labs 6 months ago showed normal TSH but slightly elevated T4.  We did not change the dose and repeat labs done a week ago still showed a low normal TSH and slightly elevated T4 though the T4 is downtrending.  Since the T4 is downtrending we will repeat labs in 3 months before deciding any dose changes.  -Continue levothyroxine 88 mcg      Return in about 2 months (around 6/5/2021) for f/u famotidine.    Please note that this dictation was created using voice recognition software. I have made every reasonable attempt to correct obvious errors, but I expect that there are errors of grammar and possibly content that I did not discover before finalizing the note.

## 2021-06-01 RX ORDER — FAMOTIDINE 20 MG/1
TABLET, FILM COATED ORAL
Qty: 120 TABLET | Refills: 0 | Status: SHIPPED | OUTPATIENT
Start: 2021-06-01

## 2021-06-11 ENCOUNTER — OFFICE VISIT (OUTPATIENT)
Dept: NEUROLOGY | Facility: MEDICAL CENTER | Age: 61
End: 2021-06-11
Attending: PSYCHIATRY & NEUROLOGY
Payer: COMMERCIAL

## 2021-06-11 VITALS
RESPIRATION RATE: 20 BRPM | OXYGEN SATURATION: 96 % | BODY MASS INDEX: 21.3 KG/M2 | SYSTOLIC BLOOD PRESSURE: 104 MMHG | WEIGHT: 108.47 LBS | DIASTOLIC BLOOD PRESSURE: 70 MMHG | TEMPERATURE: 97.9 F | HEART RATE: 96 BPM | HEIGHT: 60 IN

## 2021-06-11 DIAGNOSIS — G20.A1 PARKINSON'S DISEASE (HCC): Primary | ICD-10-CM

## 2021-06-11 PROBLEM — G20.C PARKINSONISM (HCC): Status: ACTIVE | Noted: 2018-01-24

## 2021-06-11 PROCEDURE — 99214 OFFICE O/P EST MOD 30 MIN: CPT | Performed by: PSYCHIATRY & NEUROLOGY

## 2021-06-11 PROCEDURE — 99202 OFFICE O/P NEW SF 15 MIN: CPT | Performed by: PSYCHIATRY & NEUROLOGY

## 2021-06-11 PROCEDURE — 99212 OFFICE O/P EST SF 10 MIN: CPT | Performed by: PSYCHIATRY & NEUROLOGY

## 2021-06-11 RX ORDER — CARBIDOPA AND LEVODOPA 25; 100 MG/1; MG/1
1 TABLET, EXTENDED RELEASE ORAL 3 TIMES DAILY
Qty: 120 TABLET | Refills: 5 | Status: SHIPPED | DISCHARGE
Start: 2021-06-11 | End: 2022-10-11 | Stop reason: SDUPTHER

## 2021-06-11 RX ORDER — AZELASTINE 1 MG/ML
SPRAY, METERED NASAL
COMMUNITY
Start: 2021-05-25 | End: 2022-11-02

## 2021-06-11 RX ORDER — CARBIDOPA AND LEVODOPA 25; 100 MG/1; MG/1
TABLET, EXTENDED RELEASE ORAL
COMMUNITY
Start: 2021-05-28 | End: 2021-06-11

## 2021-06-11 RX ORDER — TRIHEXYPHENIDYL HYDROCHLORIDE 2 MG/1
2 TABLET ORAL 3 TIMES DAILY
Qty: 90 TABLET | Refills: 11 | Status: SHIPPED | OUTPATIENT
Start: 2021-06-11 | End: 2022-03-11

## 2021-06-11 ASSESSMENT — ENCOUNTER SYMPTOMS
CONSTIPATION: 0
SPEECH CHANGE: 0
LOSS OF CONSCIOUSNESS: 0
TREMORS: 1
FALLS: 0

## 2021-06-11 ASSESSMENT — FIBROSIS 4 INDEX: FIB4 SCORE: 1.46

## 2021-06-11 NOTE — PROGRESS NOTES
"Subjective:      Mariela Huff is a 60 y.o. female who presents for follow-up, with a history of Parkinson's disease, also being followed by the movement disorder specialist at [facility], Dr. Kena Winston MD.    HPI    Last seen by me in this office 6 months ago, at the time she had been doing well but was having problems with dystonia involving the right foot.  Medication for wearing-off, she was having some tolerability issues with the Artane and adding dopamine.  She is followed with Dr. Winston since then, last on March 25, 2021.  At that time she received her first Botox injections, very low dose almost as a \"test\", and she has not felt this provided much benefit.  In the meantime she has also felt that the second and third toes of the left foot have begun to flex slightly.    From day today there is still consistency with her medication regimen.  Tremor is still in the right hand, this is the first sign that medicine wears off if it does happen.  She is always slower in the morning, even with her present regimen, it typically takes the second dose in the afternoon for consistent benefit to be shown.  She notes this because it seizure to walk, movements are much more fluid and spontaneous.  She gets about 5-6 hours of benefit, if she waits longer, the tremor of the right hand and the slowness with the right leg begin to tell her she is missed a dose.  She denies any peak-dose effect such as dyskinesias, but dopamine has made no difference with the dystonia.  She denies fluctuations, on/off episodes, she is certainly not freezing when she stands or turns quarters.    Voice volume and quality are maintained, she may cough on food occasionally but this is not problematic.  Cognition has been intact throughout, she denies any changes in mood or increasing irritability.  There is only minimal change with dexterity when she is on medication.  Bowel and bladder functions are maintained, she has had no " problems with constipation.  She sleeps well, denies issues with nightmares, sleep attacks during the daytime, orthostatic dizziness or actual syncope, etc.    She remains on Artane 2 mg taken with Sinemet CR 25/100, 1 tablet each at 8 AM, about 1 PM and then 6 PM.  Sinemet CR was used instead of the old IR formulation, the latter having tolerability and efficacy issues.  She is much happier in this regard.  Her last note from Dr. Winston indicated she could increase the Sinemet to a 2 tablet, 3 times daily regimen if needed.    Medical, surgical and family histories are reviewed, there are no new drug allergies.  She is on Artane and Sinemet CR 25/100 as above, also Synthroid, Pepcid, vitamin D and vitamin B12 supplement.    Review of Systems   Constitutional: Negative for malaise/fatigue.   Gastrointestinal: Negative for constipation.   Musculoskeletal: Negative for falls.   Neurological: Positive for tremors. Negative for speech change and loss of consciousness.   All other systems reviewed and are negative.       Objective:     /70 (BP Location: Right arm, Patient Position: Sitting, BP Cuff Size: Adult)   Pulse 96   Temp 36.6 °C (97.9 °F) (Temporal)   Resp 20   Ht 1.524 m (5')   Wt 49.2 kg (108 lb 7.5 oz)   SpO2 96%   BMI 21.18 kg/m²      Physical Exam    She appears in no acute distress.  As always, she is very pleasant in spirit and demeanor.  Her vital signs are stable.  There is no malar rash or sialorrhea.  Glabellar tap is absent.  The neck is supple.  There is no tremulousness of the head.  Cardiac evaluation reveals a regular rhythm.    Neurological Exam    Fully oriented, there is no aphasia, apraxia, or inattention.  Her mood is euthymic, affect mood appropriate.    PERRLA/EOMI, visual fields are full.  There is only slight hypophonia and bradykinesia, eye blink frequency only minimally diminished.  Facial movements are symmetric, voice volume is quite good even as she speaks quickly, she  enunciates well.  The tongue and uvula are midline, there is no bulbar dysfunction.    Musculoskeletal exam reveals no tremor, either at rest or with sustained posture against gravity in the right upper extremity.  There is only slight rigidity, mostly with distraction on the right.  There is no asterixis or drift.  Strength is intact in all 4 extremities.  There is only mild dystonia with the left foot.  There are no reflex asymmetries.    She stands slowly but does so easily, she walks with only a slight reduction in right arm swing amplitude, the right leg is still moved a little more slowly and stiffly, but stride length is still maintained.  She requires only 3 steps to turn, she does so easily and quickly to either side.  Repetitive movements are actually quite good with both fingers and feet, amplitudes only slightly diminished with the fingers.  There is no appendicular dystaxia.    Sensory exam is intact to temperature and vibration.     Assessment/Plan:     1. Parkinson's disease (HCC)  She is doing incredibly well under the guidance of Dr. Winston at [facility].  She is following up in another month for reevaluation and I think another Botox injection series.  Hopefully she will begin to see some benefit, I told her to talk with Dr. Winston about the toes of the left foot as beginning to get involved.    From a medication standpoint, given the AM slowness, not surprising given the long interval overnight without medication, I told her to take an additional tablet of the Sinemet CR, maintaining a single tablet throughout the day and evening, all with her Artane 2 mg tablets.  She wants to get off the Artane, I told her to talk with Dr. Winston about this.  We typically adjust a single medication at a time.  She will continue her therapies as well as outdoor activities.  We will follow-up in 6 months.    - carbidopa-levodopa CR (SINEMET CR)  MG per tablet; Take 1 tablet by mouth 3 times a day.  Dispense: 120  tablet; Refill: 5  - trihexyphenidyl (ARTANE) 2 MG Tab; Take 1 tablet by mouth 3 times a day.  Dispense: 90 tablet; Refill: 11    Time: 25 minutes spent face-to-face for exam, review, discussion, and education, of this over 60% of the time spent counseling and coordinating care.

## 2021-06-15 ENCOUNTER — OFFICE VISIT (OUTPATIENT)
Dept: MEDICAL GROUP | Facility: PHYSICIAN GROUP | Age: 61
End: 2021-06-15
Payer: COMMERCIAL

## 2021-06-15 VITALS
TEMPERATURE: 98.8 F | WEIGHT: 110 LBS | HEART RATE: 70 BPM | RESPIRATION RATE: 16 BRPM | BODY MASS INDEX: 21.6 KG/M2 | HEIGHT: 60 IN | OXYGEN SATURATION: 98 % | DIASTOLIC BLOOD PRESSURE: 70 MMHG | SYSTOLIC BLOOD PRESSURE: 116 MMHG

## 2021-06-15 DIAGNOSIS — R05.3 CHRONIC COUGH: ICD-10-CM

## 2021-06-15 DIAGNOSIS — R06.02 SHORTNESS OF BREATH: ICD-10-CM

## 2021-06-15 DIAGNOSIS — R14.0 BLOATING: ICD-10-CM

## 2021-06-15 DIAGNOSIS — R13.10 DYSPHAGIA, UNSPECIFIED TYPE: ICD-10-CM

## 2021-06-15 DIAGNOSIS — R07.89 CHEST TIGHTNESS: ICD-10-CM

## 2021-06-15 PROBLEM — R06.00 DYSPNEA: Status: ACTIVE | Noted: 2020-04-25

## 2021-06-15 PROCEDURE — 99214 OFFICE O/P EST MOD 30 MIN: CPT | Performed by: FAMILY MEDICINE

## 2021-06-15 ASSESSMENT — FIBROSIS 4 INDEX: FIB4 SCORE: 1.46

## 2021-06-15 NOTE — PROGRESS NOTES
Subjective:     CC: f/u famotidine    HPI:   Mariela presents today with     Dry cough  At her last appointment we started famotidine for potential silent reflux as a cause for her dry cough. She has also had exertional dyspnea for >1 year. Recently noticed it only occurs after eating. She did note an improvement in both the cough and the SOB/chest tightness. Still getting bloating. The sensation of food getting stuck has improved with famotidine but not resolved.      Current Outpatient Medications Ordered in Epic   Medication Sig Dispense Refill   • azelastine (ASTELIN) 137 MCG/SPRAY nasal spray      • carbidopa-levodopa CR (SINEMET CR)  MG per tablet Take 1 tablet by mouth 3 times a day. 120 tablet 5   • trihexyphenidyl (ARTANE) 2 MG Tab Take 1 tablet by mouth 3 times a day. 90 tablet 11   • famotidine (PEPCID) 20 MG Tab TAKE 1 TABLET BY MOUTH TWICE A  tablet 0   • levothyroxine (SYNTHROID) 88 MCG Tab TAKE 1 TABLET EVERY MORNING ON AN EMPTY STOMACH 90 tablet 3   • onabotulinum toxin type A (BOTOX) 100 UNIT Recon Soln Inject 100 Units into the shoulder, thigh, or buttocks. Inject 100 Units into the muscle one time every 3 months.     • Cyanocobalamin (B-12) 3000 MCG SL Tab Place 1 Tab under tongue.     • Ascorbic Acid (VITAMIN C) 500 MG Chew Tab Take 500 mg by mouth every day.     • Cholecalciferol (VITAMIN D) 50 MCG (2000 UT) Cap Take 1 Cap by mouth.       No current Epic-ordered facility-administered medications on file.       Health Maintenance: Completed    ROS:  Gen: no fevers/chills  GI: no nausea/vomiting  : no dysuria      Objective:     Exam:  /70 (BP Location: Right arm, Patient Position: Sitting, BP Cuff Size: Adult)   Pulse 70   Temp 37.1 °C (98.8 °F) (Temporal)   Resp 16   Ht 1.524 m (5')   Wt 49.9 kg (110 lb)   SpO2 98%   BMI 21.48 kg/m²  Body mass index is 21.48 kg/m².    Gen: Alert and oriented, No apparent distress.  Neck: Neck is supple without  lymphadenopathy.  Lungs: Normal effort, CTA bilaterally, no wheezes, rhonchi, or rales  CV: Regular rate and rhythm. No murmurs, rubs, or gallops.  Ext: No clubbing, cyanosis, edema.    Assessment & Plan:     60 y.o. female with the following -     1. Shortness of breath  2. Chronic cough  3. Chest tightness  This is a chronic condition, somewhat improved.  For over a year she has had what was initially exertional dyspnea but now the dyspnea only occurs after eating.  She is also had a chronic, dry cough.  She is also had associated chest tightness.  She has seen pulmonology and has done a fairly significant work-up and they cannot find a pulmonary reason for her symptoms.  At her last appointment I wondered if there was silent reflux contributing to her symptoms we started her on famotidine twice daily.  Today she reports some improvement in her dyspnea, chest tightness, and cough though she cannot quantify that improvement for me.  It seems like we are now on the right track for determining the source of her symptoms we will send her to GI for further evaluation and treatment.  In the meantime she will continue the famotidine.  - REFERRAL TO GASTROENTEROLOGY    4. Dysphagia, unspecified type  This is a chronic condition, somewhat improved. At her last appointment she mentioned that for years she has had intermittent episodes of difficulty swallowing and it feels like the food gets stuck near the suprasternal notch.  The famotidine has helped with this somewhat but has not completely resolved this we will send her to GI for further evaluation and treatment.  He may require an EGD with possible dilatation.  In the meantime, continue famotidine.  - REFERRAL TO GASTROENTEROLOGY     5. Bloating  This is a chronic condition, unchanged.  She has had bloating for some time and initially she thought it was due to dairy but it still occurring despite cutting out dairy.  Famotidine did not provide any relief of her bloating  so we will let her discuss this further with GI.  - REFERRAL TO GASTROENTEROLOGY    Return if symptoms worsen or fail to improve.    Please note that this dictation was created using voice recognition software. I have made every reasonable attempt to correct obvious errors, but I expect that there are errors of grammar and possibly content that I did not discover before finalizing the note.

## 2021-07-29 ENCOUNTER — APPOINTMENT (OUTPATIENT)
Dept: RADIOLOGY | Facility: MEDICAL CENTER | Age: 61
End: 2021-07-29
Attending: FAMILY MEDICINE
Payer: COMMERCIAL

## 2021-07-29 DIAGNOSIS — Z12.31 VISIT FOR SCREENING MAMMOGRAM: ICD-10-CM

## 2021-08-04 ENCOUNTER — APPOINTMENT (RX ONLY)
Dept: URBAN - METROPOLITAN AREA CLINIC 4 | Facility: CLINIC | Age: 61
Setting detail: DERMATOLOGY
End: 2021-08-04

## 2021-08-04 DIAGNOSIS — L81.4 OTHER MELANIN HYPERPIGMENTATION: ICD-10-CM

## 2021-08-04 DIAGNOSIS — L82.0 INFLAMED SEBORRHEIC KERATOSIS: ICD-10-CM

## 2021-08-04 DIAGNOSIS — D18.0 HEMANGIOMA: ICD-10-CM

## 2021-08-04 DIAGNOSIS — Z71.89 OTHER SPECIFIED COUNSELING: ICD-10-CM

## 2021-08-04 DIAGNOSIS — D22 MELANOCYTIC NEVI: ICD-10-CM

## 2021-08-04 DIAGNOSIS — L82.1 OTHER SEBORRHEIC KERATOSIS: ICD-10-CM

## 2021-08-04 PROBLEM — D22.39 MELANOCYTIC NEVI OF OTHER PARTS OF FACE: Status: ACTIVE | Noted: 2021-08-04

## 2021-08-04 PROBLEM — D18.01 HEMANGIOMA OF SKIN AND SUBCUTANEOUS TISSUE: Status: ACTIVE | Noted: 2021-08-04

## 2021-08-04 PROBLEM — D23.72 OTHER BENIGN NEOPLASM OF SKIN OF LEFT LOWER LIMB, INCLUDING HIP: Status: ACTIVE | Noted: 2021-08-04

## 2021-08-04 PROBLEM — D22.5 MELANOCYTIC NEVI OF TRUNK: Status: ACTIVE | Noted: 2021-08-04

## 2021-08-04 PROCEDURE — ? SUNSCREEN TREATMENT REGIMEN

## 2021-08-04 PROCEDURE — 17110 DESTRUCTION B9 LES UP TO 14: CPT

## 2021-08-04 PROCEDURE — ? LIQUID NITROGEN

## 2021-08-04 PROCEDURE — ? COUNSELING

## 2021-08-04 PROCEDURE — ? SUNSCREEN RECOMMENDATIONS

## 2021-08-04 PROCEDURE — ? OBSERVATION

## 2021-08-04 PROCEDURE — 99213 OFFICE O/P EST LOW 20 MIN: CPT | Mod: 25

## 2021-08-04 ASSESSMENT — LOCATION SIMPLE DESCRIPTION DERM
LOCATION SIMPLE: LEFT CHEEK
LOCATION SIMPLE: LEFT HAND
LOCATION SIMPLE: RIGHT HAND
LOCATION SIMPLE: LEFT UPPER BACK
LOCATION SIMPLE: RIGHT LOWER BACK
LOCATION SIMPLE: UPPER BACK
LOCATION SIMPLE: RIGHT UPPER BACK
LOCATION SIMPLE: LEFT FOREHEAD
LOCATION SIMPLE: ABDOMEN
LOCATION SIMPLE: CHEST
LOCATION SIMPLE: RIGHT BREAST

## 2021-08-04 ASSESSMENT — LOCATION DETAILED DESCRIPTION DERM
LOCATION DETAILED: LEFT INFERIOR MEDIAL MALAR CHEEK
LOCATION DETAILED: RIGHT LATERAL ABDOMEN
LOCATION DETAILED: LEFT INFERIOR MEDIAL FOREHEAD
LOCATION DETAILED: RIGHT INFERIOR MEDIAL MIDBACK
LOCATION DETAILED: RIGHT MEDIAL SUPERIOR CHEST
LOCATION DETAILED: RIGHT MEDIAL BREAST 4-5:00 REGION
LOCATION DETAILED: RIGHT MID-UPPER BACK
LOCATION DETAILED: RIGHT SUPERIOR MEDIAL UPPER BACK
LOCATION DETAILED: RIGHT RADIAL DORSAL HAND
LOCATION DETAILED: LEFT MID-UPPER BACK
LOCATION DETAILED: RIGHT SUPERIOR UPPER BACK
LOCATION DETAILED: PERIUMBILICAL SKIN
LOCATION DETAILED: EPIGASTRIC SKIN
LOCATION DETAILED: LEFT INFERIOR UPPER BACK
LOCATION DETAILED: LEFT ULNAR DORSAL HAND
LOCATION DETAILED: INFERIOR THORACIC SPINE
LOCATION DETAILED: RIGHT INFERIOR UPPER BACK

## 2021-08-04 ASSESSMENT — LOCATION ZONE DERM
LOCATION ZONE: HAND
LOCATION ZONE: FACE
LOCATION ZONE: TRUNK

## 2021-08-04 NOTE — PROCEDURE: LIQUID NITROGEN
Include Z78.9 (Other Specified Conditions Influencing Health Status) As An Associated Diagnosis?: No
Medical Necessity Clause: This procedure was medically necessary because the lesions that were treated were:
Detail Level: Detailed
Number Of Freeze-Thaw Cycles: 3 freeze-thaw cycles
Medical Necessity Information: It is in your best interest to select a reason for this procedure from the list below. All of these items fulfill various CMS LCD requirements except the new and changing color options.
Duration Of Freeze Thaw-Cycle (Seconds): 10-15
Show Applicator Variable?: Yes
Consent: The patient's consent was obtained including but not limited to risks of crusting, scabbing, blistering, scarring, darker or lighter pigmentary change, recurrence, incomplete removal and infection.
Post-Care Instructions: I reviewed with the patient in detail post-care instructions. Patient is to wear sunprotection, and avoid picking at any of the treated lesions. Pt may apply Vaseline to crusted or scabbing areas.

## 2021-09-13 ENCOUNTER — HOSPITAL ENCOUNTER (OUTPATIENT)
Dept: RADIOLOGY | Facility: MEDICAL CENTER | Age: 61
End: 2021-09-13
Attending: FAMILY MEDICINE
Payer: COMMERCIAL

## 2021-09-13 DIAGNOSIS — Z12.31 VISIT FOR SCREENING MAMMOGRAM: ICD-10-CM

## 2021-09-13 PROCEDURE — 77063 BREAST TOMOSYNTHESIS BI: CPT

## 2021-09-30 ENCOUNTER — HOSPITAL ENCOUNTER (OUTPATIENT)
Facility: MEDICAL CENTER | Age: 61
End: 2021-09-30
Attending: FAMILY MEDICINE
Payer: COMMERCIAL

## 2021-09-30 ENCOUNTER — OFFICE VISIT (OUTPATIENT)
Dept: MEDICAL GROUP | Facility: PHYSICIAN GROUP | Age: 61
End: 2021-09-30
Payer: COMMERCIAL

## 2021-09-30 VITALS
TEMPERATURE: 99.2 F | HEART RATE: 86 BPM | DIASTOLIC BLOOD PRESSURE: 72 MMHG | SYSTOLIC BLOOD PRESSURE: 112 MMHG | HEIGHT: 60 IN | WEIGHT: 105.8 LBS | OXYGEN SATURATION: 92 % | BODY MASS INDEX: 20.77 KG/M2

## 2021-09-30 DIAGNOSIS — J06.9 VIRAL UPPER RESPIRATORY TRACT INFECTION: ICD-10-CM

## 2021-09-30 PROCEDURE — 99213 OFFICE O/P EST LOW 20 MIN: CPT | Performed by: FAMILY MEDICINE

## 2021-09-30 PROCEDURE — U0003 INFECTIOUS AGENT DETECTION BY NUCLEIC ACID (DNA OR RNA); SEVERE ACUTE RESPIRATORY SYNDROME CORONAVIRUS 2 (SARS-COV-2) (CORONAVIRUS DISEASE [COVID-19]), AMPLIFIED PROBE TECHNIQUE, MAKING USE OF HIGH THROUGHPUT TECHNOLOGIES AS DESCRIBED BY CMS-2020-01-R: HCPCS

## 2021-09-30 PROCEDURE — U0005 INFEC AGEN DETEC AMPLI PROBE: HCPCS

## 2021-09-30 RX ORDER — BENZONATATE 100 MG/1
100 CAPSULE ORAL 3 TIMES DAILY PRN
Qty: 60 CAPSULE | Refills: 0 | Status: SHIPPED | OUTPATIENT
Start: 2021-09-30 | End: 2022-01-14

## 2021-09-30 RX ORDER — DOXYCYCLINE HYCLATE 100 MG
100 TABLET ORAL 2 TIMES DAILY
Qty: 14 TABLET | Refills: 0 | Status: SHIPPED | OUTPATIENT
Start: 2021-09-30 | End: 2021-10-07

## 2021-09-30 ASSESSMENT — FIBROSIS 4 INDEX: FIB4 SCORE: 1.46

## 2021-09-30 NOTE — ASSESSMENT & PLAN NOTE
This is a new problem.  The patient has been developing upper respiratory infection symptoms over the past 3 days.  She is developing a worsening cough and also sinus pressure behind her eyes. she endorses a recent/possible croup exposure 1 week ago (grandson).  She did have a covid test at home 2 weeks ago and negative. Has not done one since she got sick.  She denies any coughing spells or vomiting 2/2 to it.   Feels she is having worsening sinus pressure.

## 2021-09-30 NOTE — PROGRESS NOTES
Subjective:     Chief Complaint   Patient presents with   • Cough     x3days,worsening    • Sinus Pain     eyes hurt, x3 days, worsening    • Other     possible Croup, exposed last week.       HPI:   Mariela presents today to discuss the following.      Viral upper respiratory tract infection  This is a new problem.  The patient has been developing upper respiratory infection symptoms over the past 3 days.  She is developing a worsening cough and also sinus pressure behind her eyes. she endorses a recent/possible croup exposure 1 week ago (grandson).  She did have a covid test at home 2 weeks ago and negative. Has not done one since she got sick.  She denies any coughing spells or vomiting 2/2 to it.   Feels she is having worsening sinus pressure.       Past Medical History:   Diagnosis Date   • Anesthesia     severe ponv, hard to wake up   • Arthritis     shoulders   • Chest tightness    • Cough    • Diverticulosis 2012    pt stated it was noted on colonoscopy   • Hypothyroid     hypothyroid   • Infectious disease     around bronchitis/flu 12/2011   • Lyme disease     chronic   • Pain 04/2018    right foot, bilateral shoulder   • Painful breathing    • Shortness of breath        Current Outpatient Medications Ordered in Epic   Medication Sig Dispense Refill   • doxycycline (VIBRAMYCIN) 100 MG Tab Take 1 Tablet by mouth 2 times a day for 7 days. 14 Tablet 0   • benzonatate (TESSALON) 100 MG Cap Take 1 Capsule by mouth 3 times a day as needed for Cough. 60 Capsule 0   • azelastine (ASTELIN) 137 MCG/SPRAY nasal spray      • carbidopa-levodopa CR (SINEMET CR)  MG per tablet Take 1 tablet by mouth 3 times a day. 120 tablet 5   • trihexyphenidyl (ARTANE) 2 MG Tab Take 1 tablet by mouth 3 times a day. 90 tablet 11   • famotidine (PEPCID) 20 MG Tab TAKE 1 TABLET BY MOUTH TWICE A  tablet 0   • levothyroxine (SYNTHROID) 88 MCG Tab TAKE 1 TABLET EVERY MORNING ON AN EMPTY STOMACH 90 tablet 3   • onabotulinum toxin  type A (BOTOX) 100 UNIT Recon Soln Inject 100 Units into the shoulder, thigh, or buttocks. Inject 100 Units into the muscle one time every 3 months.     • Cyanocobalamin (B-12) 3000 MCG SL Tab Place 1 Tab under tongue.     • Ascorbic Acid (VITAMIN C) 500 MG Chew Tab Take 500 mg by mouth every day.     • Cholecalciferol (VITAMIN D) 50 MCG (2000 UT) Cap Take 1 Cap by mouth.       No current Epic-ordered facility-administered medications on file.       Allergies:  Pcn [penicillins], Percocet [oxycodone-acetaminophen], and Symbicort [budesonide-formoterol fumarate]    Health Maintenance: Completed    ROS:  Gen: no fevers/chills, no changes in weight  Eyes: no changes in vision  Pulm: no sob  CV: no chest pain, no palpitations  GI: no nausea/vomiting, no diarrhea      Objective:     Exam:  /72 (BP Location: Right arm, Patient Position: Sitting, BP Cuff Size: Adult)   Pulse 86   Temp 37.3 °C (99.2 °F) (Temporal)   Ht 1.524 m (5')   Wt 48 kg (105 lb 12.8 oz)   SpO2 92%   BMI 20.66 kg/m²  Body mass index is 20.66 kg/m².        Constitutional: Alert, no distress, well-groomed.  Skin: Warm, dry, good turgor, no rashes in visible areas.  Eye: Equal, round and reactive, conjunctiva clear, lids normal.  ENMT: Lips without lesions, good dentition, moist mucous membranes.  Neck: Trachea midline, no masses, no thyromegaly.  Respiratory: Unlabored respiratory effort, no cough.  MSK: Normal gait, moves all extremities.  Neuro: Grossly non-focal.   Psych: Alert and oriented x3, normal affect and mood.        Assessment & Plan:     60 y.o. adult with the following -     1. Viral upper respiratory tract infection  This is a new problem.  Worsening.  She presents with upper respiratory infection symptomatology concerning for superinfection given that she is developing worsening sinus pressure and drainage.  She has not been tested for COVID-19 since developing symptoms.  I will get her screened today.  I will also provide her  with a pocket antibiotic for doxycycline for 7 days as she is allergic to penicillins to treat for possible sinusitis.  I will also provide her with symptomatic relief with Tessalon Perles.  Isolation precautions recommended at this time.  - 2019 SARS-CoV-2 by PCR ([facility]); Future  - benzonatate (TESSALON) 100 MG Cap; Take 1 Capsule by mouth 3 times a day as needed for Cough.  Dispense: 60 Capsule; Refill: 0      No follow-ups on file.    Please note that this dictation was created using voice recognition software. I have made every reasonable attempt to correct obvious errors, but I expect that there are errors of grammar and possibly content that I did not discover before finalizing the note.

## 2021-10-01 DIAGNOSIS — J06.9 VIRAL UPPER RESPIRATORY TRACT INFECTION: ICD-10-CM

## 2021-10-01 LAB — COVID ORDER STATUS COVID19: NORMAL

## 2021-10-01 PROCEDURE — U0003 INFECTIOUS AGENT DETECTION BY NUCLEIC ACID (DNA OR RNA); SEVERE ACUTE RESPIRATORY SYNDROME CORONAVIRUS 2 (SARS-COV-2) (CORONAVIRUS DISEASE [COVID-19]), AMPLIFIED PROBE TECHNIQUE, MAKING USE OF HIGH THROUGHPUT TECHNOLOGIES AS DESCRIBED BY CMS-2020-01-R: HCPCS

## 2021-10-01 PROCEDURE — U0005 INFEC AGEN DETEC AMPLI PROBE: HCPCS

## 2021-10-02 LAB
SARS-COV-2 RNA RESP QL NAA+PROBE: NOTDETECTED
SPECIMEN SOURCE: NORMAL

## 2021-12-14 ENCOUNTER — OFFICE VISIT (OUTPATIENT)
Dept: NEUROLOGY | Facility: MEDICAL CENTER | Age: 61
End: 2021-12-14
Attending: PSYCHIATRY & NEUROLOGY
Payer: COMMERCIAL

## 2021-12-14 VITALS
DIASTOLIC BLOOD PRESSURE: 64 MMHG | TEMPERATURE: 98.7 F | RESPIRATION RATE: 16 BRPM | BODY MASS INDEX: 20.82 KG/M2 | WEIGHT: 106.04 LBS | OXYGEN SATURATION: 96 % | SYSTOLIC BLOOD PRESSURE: 100 MMHG | HEIGHT: 60 IN | HEART RATE: 104 BPM

## 2021-12-14 DIAGNOSIS — G20.A1 PARKINSON'S DISEASE (HCC): ICD-10-CM

## 2021-12-14 DIAGNOSIS — G24.9 DYSTONIA OF FOOT: ICD-10-CM

## 2021-12-14 PROCEDURE — 99212 OFFICE O/P EST SF 10 MIN: CPT | Performed by: PSYCHIATRY & NEUROLOGY

## 2021-12-14 PROCEDURE — 99214 OFFICE O/P EST MOD 30 MIN: CPT | Performed by: PSYCHIATRY & NEUROLOGY

## 2021-12-14 ASSESSMENT — FIBROSIS 4 INDEX: FIB4 SCORE: 1.48

## 2021-12-14 NOTE — PROGRESS NOTES
Chief Complaint   Patient presents with   • New Patient     Parkinson's disease       History of present illness:  Mariela Huff 61 y.o. adult with Parkinson's disease since 2017 with OFF toe curling dystonia s/p botox.      PD Meds:  Artane 2 mg PO TID at 7-8 pm, 1-2 pm and 7 pm- improved stiffness in right shoulder and hip.  Sinemet CR 25/100mg 1.5 tablet QAM 8am, 1.5 tablets at 1pm, and 6 pm    She has had right sided cramping and artane has been helpful for treatment of this. Her main issue is curling of her toes and turning inwards of her ankles first thing in the morning. This tends to occur before her first dose of sinemet or in between doses of sinemet.   The immediate release sinemet caused involuntary movement and poor coordination of her left leg. The CR sinemet does not cause this problem as much. The CR dose takes about 45 minutes to 1 hour to kick in.       Past medical history:   Past Medical History:   Diagnosis Date   • Anesthesia     severe ponv, hard to wake up   • Arthritis     shoulders   • Chest tightness    • Cough    • Diverticulosis 2012    pt stated it was noted on colonoscopy   • Hypothyroid     hypothyroid   • Infectious disease     around bronchitis/flu 12/2011   • Lyme disease     chronic   • Pain 04/2018    right foot, bilateral shoulder   • Painful breathing    • Shortness of breath        Past surgical history:   Past Surgical History:   Procedure Laterality Date   • TOE FUSION Right 5/7/2018    Procedure: TOE FUSION - 1ST MTP;  Surgeon: Janes Benavidez M.D.;  Location: SURGERY SAME DAY [facility];  Service: Orthopedics   • FLEXOR TENDON REPAIR Right 5/7/2018    Procedure: FLEXOR TENDON REPAIR - 4TH RELEASE W/SOFT TISSUE RELEASE 2/3;  Surgeon: Janes Benavidez M.D.;  Location: SURGERY SAME DAY [facility];  Service: Orthopedics   • HARDWARE REMOVAL ORTHO Right 5/7/2018    Procedure: HARDWARE REMOVAL ORTHO - INTERNAL FIXATION;  Surgeon: Janes Benavidez M.D.;  Location:  SURGERY SAME DAY [facility];  Service: Orthopedics   • ORTHOPEDIC OSTEOTOMY Right 5/7/2018    Procedure: ORTHOPEDIC OSTEOTOMY - DISTAL METATARSAL 2/3/4;  Surgeon: Janes Benavidez M.D.;  Location: SURGERY SAME DAY [facility];  Service: Orthopedics   • BUNIONECTOMY Right 5/1/2017    Procedure: BUNIONECTOMY - PROXIMAL HALLUX VALGUS CORRECTION W/BONE GRAFT;  Surgeon: Janes Benavidez M.D.;  Location: SURGERY [facility];  Service:    • NEUROMA EXCISION  5/1/2017    Procedure: NEUROMA EXCISION - 2ND WEBSPACE;  Surgeon: Janes Benavidez M.D.;  Location: SURGERY [facility];  Service:    • BREAST IMPLANT REVISION Bilateral 4/11/2016    Procedure: BREAST IMPLANT EXCHANGE OF SALINE TO SILICONE W/REPOSITIONING OF LATERAL FOLDS;  Surgeon: Mikey Adkins M.D.;  Location: SURGERY [facility];  Service:    • BREAST BIOPSY  1/24/2012    Performed by SANDRA ESTRADA at SURGERY SAME DAY [facility]   • SHOULDER ARTHROSCOPY  12/1/2010    Performed by KATE CHANCE at SURGERY [facility]   • SHOULDER DECOMPRESSION  12/1/2010    Performed by KATE CHANCE at SURGERY [facility]   • ROTATOR CUFF REPAIR  12/1/2010    Performed by KATE CHANCE at SURGERY [facility]   • SHOULDER DECOMPRESSION ARTHROSCOPIC  5/13/2009    Performed by KATE CHANCE at SURGERY [facility]   • ROTATOR CUFF REPAIR  5/13/2009    Performed by KATE CHANCE at SURGERY [facility]   • HYSTERECTOMY, VAGINAL  1999    fibroids   • MT BREAST AUGMENTATION WITH IMPLANT Bilateral 1999   • OTHER ORTHOPEDIC SURGERY  1993    joint repair left thumb   • US-NEEDLE CORE BX-BREAST PANEL         Family history:   Family History   Problem Relation Age of Onset   • Cancer Paternal Aunt         breast   • Diabetes Mother    • Thyroid Mother    • Diabetes Father    • Thyroid Daughter    • Thyroid Sister    • Cancer Paternal Aunt         breast, uterine   • Cancer Paternal Aunt         bladder   • Cancer Paternal Aunt         colon    • Heart Disease Neg Hx    • Stroke Neg Hx    • Alcohol/Drug Neg Hx        Social history:   Social History     Socioeconomic History   • Marital status:      Spouse name: Not on file   • Number of children: Not on file   • Years of education: Not on file   • Highest education level: Bachelor's degree (e.g., BA, AB, BS)   Occupational History   • Not on file   Tobacco Use   • Smoking status: Never Smoker   • Smokeless tobacco: Never Used   Vaping Use   • Vaping Use: Never used   Substance and Sexual Activity   • Alcohol use: Yes     Alcohol/week: 0.6 oz     Types: 1 Glasses of wine per week     Comment: Occasionally   • Drug use: No   • Sexual activity: Yes     Partners: Male   Other Topics Concern   • Not on file   Social History Narrative   • Not on file     Social Determinants of Health     Financial Resource Strain: Low Risk    • Difficulty of Paying Living Expenses: Not hard at all   Food Insecurity: No Food Insecurity   • Worried About Running Out of Food in the Last Year: Never true   • Ran Out of Food in the Last Year: Never true   Transportation Needs: No Transportation Needs   • Lack of Transportation (Medical): No   • Lack of Transportation (Non-Medical): No   Physical Activity: Sufficiently Active   • Days of Exercise per Week: 5 days   • Minutes of Exercise per Session: 50 min   Stress: No Stress Concern Present   • Feeling of Stress : Not at all   Social Connections: Unknown   • Frequency of Communication with Friends and Family: More than three times a week   • Frequency of Social Gatherings with Friends and Family: Once a week   • Attends Mormon Services: Patient refused   • Active Member of Clubs or Organizations: Yes   • Attends Club or Organization Meetings: More than 4 times per year   • Marital Status:    Intimate Partner Violence:    • Fear of Current or Ex-Partner: Not on file   • Emotionally Abused: Not on file   • Physically Abused: Not on file   • Sexually Abused: Not on  file   Housing Stability: Low Risk    • Unable to Pay for Housing in the Last Year: No   • Number of Places Lived in the Last Year: 1   • Unstable Housing in the Last Year: No       Current medications:   Current Outpatient Medications   Medication   • carbidopa-levodopa (SINEMET)  MG Tab   • azelastine (ASTELIN) 137 MCG/SPRAY nasal spray   • carbidopa-levodopa CR (SINEMET CR)  MG per tablet   • trihexyphenidyl (ARTANE) 2 MG Tab   • famotidine (PEPCID) 20 MG Tab   • levothyroxine (SYNTHROID) 88 MCG Tab   • onabotulinum toxin type A (BOTOX) 100 UNIT Recon Soln   • Cyanocobalamin (B-12) 3000 MCG SL Tab   • Ascorbic Acid (VITAMIN C) 500 MG Chew Tab   • Cholecalciferol (VITAMIN D) 50 MCG (2000 UT) Cap   • benzonatate (TESSALON) 100 MG Cap     No current facility-administered medications for this visit.       Medication Allergy:  Allergies   Allergen Reactions   • Pcn [Penicillins] Rash     Rash     • Percocet [Oxycodone-Acetaminophen] Vomiting   • Symbicort [Budesonide-Formoterol Fumarate]      Laryngitis      Physical examination:   Vitals:    12/14/21 1348 12/14/21 1351   BP: 118/62 100/64   BP Location: Left arm Left arm   Patient Position: Sitting Standing   BP Cuff Size: Adult Adult   Pulse: (!) 110 (!) 104   Resp: 16    Temp: 37.1 °C (98.7 °F)    TempSrc: Temporal    SpO2: 96% 96%   Weight: 48.1 kg (106 lb 0.7 oz)    Height: 1.524 m (5')      Neurological Exam  Mental Status  Awake and alert. Speech is normal. Language is fluent with no aphasia.    Cranial Nerves  CN III, IV, VI: Extraocular movements intact bilaterally. Normal saccades. Normal smooth pursuit.    Motor   Normal muscle tone. The following abnormal movements were seen: Mild right arm and leg rest tremor.   .  Normal finger tapping speed and amplitude bilaterally. Decreased right foot tapping speed. Normal left foot tapping speed.    Gait  Casual gait is normal including stance, stride, and arm swing.  Normal arm swing. No  bradykinesia..      Labs:  I reviewed the following labs personally:  None    Imaging:   None     ASSESSMENT AND PLAN:  Problem List Items Addressed This Visit     Parkinson's disease (HCC)    Relevant Medications    carbidopa-levodopa (SINEMET)  MG Tab      Other Visit Diagnoses     Dystonia of foot              1. Parkinson's disease (HCC)  - carbidopa-levodopa (SINEMET)  MG Tab; Take 0.5 Tablets by mouth every morning for 7 days, THEN 1 Tablet every morning for 90 days.  Dispense: 94 Tablet; Refill: 0    2. Dystonia of foot    61-year-old female with Parkinson's disease since 2017. She has had bilateral toe curling as an OFF symptom. Currently with every 5 hour dosing of Sinemet CR she is not experiencing this however does have early morning dystonia. She has been unable to tolerate immediate release Sinemet due to what sounds like lower extremity dyskinesia. I have prescribed 1 tablet of Sinemet immediate release to be given in the morning for her early morning OFF. This is in addition to her current regimen of Sinemet CR.  She is H&Y stage 1. A potential adjunctive treatment is a dopamine agonist if fluctuations worsen. However currently with her current schedule of Sinemet CR this is not required.    FOLLOW-UP:   Return in about 6 months (around 6/14/2022).    Total time spent for the day for this patient unrelated to procedure time is: 33 minutes. I spent 27 minutes in face to face time and I spent 2 minutes pre-charting and 4 minutes in post-visit documentation.      DINESH MaciasO.  [facility] Neurology   Movement Disorders Specialist

## 2021-12-14 NOTE — PATIENT INSTRUCTIONS
I have prescribed immediate release carbidopa/levodopa once daily every morning.  Continue with the CR sinemet as you are currently doing, every 5 hours.

## 2022-01-07 DIAGNOSIS — G20.A1 PARKINSON'S DISEASE (HCC): ICD-10-CM

## 2022-01-07 NOTE — TELEPHONE ENCOUNTER
Received request via: Pharmacy    Was the patient seen in the last year in this department? Yes    Does the patient have an active prescription (recently filled or refills available) for medication(s) requested? Yes.   Dr Cam pt

## 2022-01-14 ENCOUNTER — OFFICE VISIT (OUTPATIENT)
Dept: MEDICAL GROUP | Facility: PHYSICIAN GROUP | Age: 62
End: 2022-01-14
Payer: COMMERCIAL

## 2022-01-14 VITALS
OXYGEN SATURATION: 96 % | RESPIRATION RATE: 16 BRPM | HEART RATE: 92 BPM | SYSTOLIC BLOOD PRESSURE: 102 MMHG | HEIGHT: 60 IN | WEIGHT: 106.5 LBS | BODY MASS INDEX: 20.91 KG/M2 | TEMPERATURE: 98.2 F | DIASTOLIC BLOOD PRESSURE: 58 MMHG

## 2022-01-14 DIAGNOSIS — E03.9 HYPOTHYROIDISM, UNSPECIFIED TYPE: ICD-10-CM

## 2022-01-14 DIAGNOSIS — Z00.00 WELLNESS EXAMINATION: ICD-10-CM

## 2022-01-14 DIAGNOSIS — D72.819 LEUKOPENIA, UNSPECIFIED TYPE: ICD-10-CM

## 2022-01-14 PROBLEM — J06.9 VIRAL UPPER RESPIRATORY TRACT INFECTION: Status: RESOLVED | Noted: 2021-09-30 | Resolved: 2022-01-14

## 2022-01-14 PROBLEM — R14.0 BLOATING: Status: RESOLVED | Noted: 2020-09-17 | Resolved: 2022-01-14

## 2022-01-14 PROCEDURE — 99396 PREV VISIT EST AGE 40-64: CPT | Performed by: FAMILY MEDICINE

## 2022-01-14 ASSESSMENT — FIBROSIS 4 INDEX: FIB4 SCORE: 1.48

## 2022-01-14 ASSESSMENT — PATIENT HEALTH QUESTIONNAIRE - PHQ9: CLINICAL INTERPRETATION OF PHQ2 SCORE: 0

## 2022-01-14 NOTE — PROGRESS NOTES
Subjective:     CC:   Chief Complaint   Patient presents with   • Annual Exam     HPI:   Mariela Huff is a 59 y.o. female who presents for annual exam. She is feeling well and denies any complaints.    Ob-Gyn/ History:    Patient has GYN provider: no  /Para:  2/2  Last Pap Smear:  unknown. No history of abnormal pap smears.  Gyn Surgery:  hysterectomy.  Current Contraceptive Method:  N/a. Yes currently sexually active.  Last menstrual period:  N/a.  No significant bloating/fluid retention, pelvic pain, or dyspareunia. No vaginal discharge  Post-menopausal bleeding: no  Urinary incontinence: +stress - mild  Folate intake: n/a     Health Maintenance  Advanced directive: n/a   Osteoporosis Screen/ DEXA: n/a   PT/vit D for falls prevention: n/a   Cholesterol Screenin2019 - T chol 189, TG 64, HDL 76, ; ASCVD 1.6%   Diabetes Screenin2020 - fasting glucose 83  Aspirin Use: n/a    Diet: healthy  Exercise: boxing class 3 times/week  Substance Abuse: no   Seat belts, bike helmet, gun safety discussed.  Sun protection used.    Cancer screening  Colorectal Cancer Screening: due     Lung Cancer Screening: n/a - never smoker  Cervical Cancer Screening: n/a - s/p hysterectomy  Breast Cancer Screening: due 2022    Infectious disease screening/Immunizations  --STI Screening: declined   --Practices safe sex.  --HIV Screening: declined   --Hepatitis C Screening: completed - negative   --Immunizations:    Influenza: declined   HPV:  n/a    Tetanus: due     Shingles: completed   Pneumococcal: due    COVID-19: completed   Other immunizations: n/a     She  has a past medical history of Anesthesia, Arthritis, Chest tightness, Cough, Diverticulosis (), Hypothyroid, Infectious disease, Lyme disease, Pain (2018), Painful breathing, and Shortness of breath. Mariela Huff also has no past medical history of Chest pain, Difficulty breathing, Fainting, Palpitations, Sputum  production, Swelling of lower extremity, or Wheezing.  She  has a past surgical history that includes hysterectomy, vaginal (1999); shoulder decompression arthroscopic (5/13/2009); rotator cuff repair (5/13/2009); other orthopedic surgery (1993); shoulder arthroscopy (12/1/2010); shoulder decompression (12/1/2010); rotator cuff repair (12/1/2010); breast biopsy (1/24/2012); pr breast augmentation with implant (Bilateral, 1999); us-needle core bx-breast panel; breast implant revision (Bilateral, 4/11/2016); bunionectomy (Right, 5/1/2017); neuroma excision (5/1/2017); toe fusion (Right, 5/7/2018); repair, tendon, lower extremity, using tendon graft (Right, 5/7/2018); hardware removal ortho (Right, 5/7/2018); and orthopedic osteotomy (Right, 5/7/2018).    Family History   Problem Relation Age of Onset   • Cancer Paternal Aunt         breast   • Diabetes Mother    • Thyroid Mother    • Diabetes Father    • Thyroid Daughter    • Thyroid Sister    • Cancer Paternal Aunt         breast, uterine   • Cancer Paternal Aunt         bladder   • Cancer Paternal Aunt         colon   • Heart Disease Neg Hx    • Stroke Neg Hx    • Alcohol/Drug Neg Hx        Social History     Socioeconomic History   • Marital status:      Spouse name: Not on file   • Number of children: Not on file   • Years of education: Not on file   • Highest education level: Bachelor's degree (e.g., BA, AB, BS)   Occupational History   • Not on file   Tobacco Use   • Smoking status: Never Smoker   • Smokeless tobacco: Never Used   Vaping Use   • Vaping Use: Never used   Substance and Sexual Activity   • Alcohol use: Yes     Alcohol/week: 0.6 oz     Types: 1 Glasses of wine per week     Comment: Occasionally   • Drug use: No   • Sexual activity: Yes     Partners: Male   Other Topics Concern   • Not on file   Social History Narrative   • Not on file     Social Determinants of Health     Financial Resource Strain:    • Difficulty of Paying Living Expenses:  Not on file   Food Insecurity:    • Worried About Running Out of Food in the Last Year: Not on file   • Ran Out of Food in the Last Year: Not on file   Transportation Needs:    • Lack of Transportation (Medical): Not on file   • Lack of Transportation (Non-Medical): Not on file   Physical Activity:    • Days of Exercise per Week: Not on file   • Minutes of Exercise per Session: Not on file   Stress:    • Feeling of Stress : Not on file   Social Connections:    • Frequency of Communication with Friends and Family: Not on file   • Frequency of Social Gatherings with Friends and Family: Not on file   • Attends Caodaism Services: Not on file   • Active Member of Clubs or Organizations: Not on file   • Attends Club or Organization Meetings: Not on file   • Marital Status: Not on file   Intimate Partner Violence:    • Fear of Current or Ex-Partner: Not on file   • Emotionally Abused: Not on file   • Physically Abused: Not on file   • Sexually Abused: Not on file   Housing Stability:    • Unable to Pay for Housing in the Last Year: Not on file   • Number of Places Lived in the Last Year: Not on file   • Unstable Housing in the Last Year: Not on file       Patient Active Problem List    Diagnosis Date Noted   • Dystonia of foot 12/14/2021   • Parkinson's disease (HCC) 06/11/2021   • Dysphagia 04/05/2021   • Pertussis 05/19/2020   • Dyspnea 04/25/2020   • Chest tightness 04/06/2020   • Arthralgia 02/28/2018   • Hypothyroidism 01/24/2018         Current Outpatient Medications   Medication Sig Dispense Refill   • carbidopa-levodopa (SINEMET)  MG Tab TAKE 0.5 TABLETS BY MOUTH EVERY MORNING FOR 7 DAYS, THEN 1 TABLET EVERY MORNING FOR 90 DAYS. 34 Tablet 2   • azelastine (ASTELIN) 137 MCG/SPRAY nasal spray      • carbidopa-levodopa CR (SINEMET CR)  MG per tablet Take 1 tablet by mouth 3 times a day. (Patient taking differently: Take 1.5 Tablets by mouth 3 times a day.) 120 tablet 5   • trihexyphenidyl (ARTANE) 2 MG  Tab Take 1 tablet by mouth 3 times a day. 90 tablet 11   • famotidine (PEPCID) 20 MG Tab TAKE 1 TABLET BY MOUTH TWICE A  tablet 0   • levothyroxine (SYNTHROID) 88 MCG Tab TAKE 1 TABLET EVERY MORNING ON AN EMPTY STOMACH 90 tablet 3   • Cyanocobalamin (B-12) 3000 MCG SL Tab Place 1 Tab under tongue.     • Ascorbic Acid (VITAMIN C) 500 MG Chew Tab Take 500 mg by mouth every day.     • Cholecalciferol (VITAMIN D) 50 MCG (2000 UT) Cap Take 1 Cap by mouth.       No current facility-administered medications for this visit.     Allergies   Allergen Reactions   • Pcn [Penicillins] Rash     Rash     • Percocet [Oxycodone-Acetaminophen] Vomiting   • Symbicort [Budesonide-Formoterol Fumarate]      Laryngitis        Review of Systems   Constitutional: Negative for fever, chills.   HENT: Negative for sore throat.    Eyes: Negative for pain.   Respiratory: Negative for cough.  Cardiovascular: Negative for leg swelling.   Gastrointestinal: Negative for nausea, vomiting.   Genitourinary: Negative for dysuria.   Skin: Negative for rash.   Neurological: Negative for dizziness.   Endo/Heme/Allergies: Does not bleed easily.   Psychiatric/Behavioral: Negative for depression.  The patient is not nervous/anxious.     Objective:     /58 (BP Location: Left arm, Patient Position: Sitting, BP Cuff Size: Adult)   Pulse 92   Temp 36.8 °C (98.2 °F) (Temporal)   Resp 16   Ht 1.524 m (5')   Wt 48.3 kg (106 lb 8 oz)   SpO2 96%   BMI 20.80 kg/m²   Body mass index is 20.8 kg/m².  Wt Readings from Last 4 Encounters:   01/14/22 48.3 kg (106 lb 8 oz)   12/14/21 48.1 kg (106 lb 0.7 oz)   09/30/21 48 kg (105 lb 12.8 oz)   06/15/21 49.9 kg (110 lb)       Physical Exam:  Constitutional: Well-developed and well-nourished. Not diaphoretic. No distress.   Skin: Skin is warm and dry. No rash noted.  Head: Atraumatic without lesions.  Eyes: Conjunctivae and extraocular motions are normal. Pupils are equal, round, and reactive to light. No  scleral icterus.   Ears:  External ears unremarkable. Tympanic membranes clear and intact.  Mouth/Throat: Dentition is good. Tongue normal. Oropharynx is clear and moist. Posterior pharynx without erythema or exudates.  Neck: Supple, trachea midline. Normal range of motion. No thyromegaly present. No lymphadenopathy--cervical or supraclavicular.  Cardiovascular: Regular rate and rhythm, S1 and S2 without murmur, rubs, or gallops.  Lungs: Normal inspiratory effort, CTA bilaterally, no wheezes/rhonchi/rales  Abdomen: Soft, non tender, and without distention. Active bowel sounds in all four quadrants. No rebound, guarding, masses or HSM.  Extremities: No cyanosis, clubbing, erythema, nor edema. Distal pulses intact and symmetric.   Musculoskeletal: All major joints AROM full in all directions without pain.  Neurological: Alert and oriented x 3. DTRs 2+/3 and symmetric. No cranial nerve deficit. 5/5 myotomes. Sensation intact.   Psychiatric:  Behavior, mood, and affect are appropriate.    Assessment and Plan:     1. Wellness examination  Comp Metabolic Panel   2. Hypothyroidism, unspecified type  Comp Metabolic Panel    TSH WITH REFLEX TO FT4   3. Leukopenia, unspecified type  CBC WITH DIFFERENTIAL      HCM:  Up to date   Labs per orders  Immunizations per orders  Patient counseled about skin care, diet, supplements, prenatal vitamins, safe sex and exercise.    Follow-up: Return in about 1 year (around 1/14/2023) for Annual/wellness visit.

## 2022-03-08 DIAGNOSIS — G20.A1 PARKINSON'S DISEASE (HCC): ICD-10-CM

## 2022-03-11 ENCOUNTER — TELEPHONE (OUTPATIENT)
Dept: NEUROLOGY | Facility: MEDICAL CENTER | Age: 62
End: 2022-03-11
Payer: COMMERCIAL

## 2022-03-11 RX ORDER — TRIHEXYPHENIDYL HYDROCHLORIDE 2 MG/1
TABLET ORAL
Qty: 90 TABLET | Refills: 11 | Status: SHIPPED | OUTPATIENT
Start: 2022-03-11 | End: 2023-05-18 | Stop reason: SDUPTHER

## 2022-03-11 NOTE — TELEPHONE ENCOUNTER
Received request via: Pharmacy    Was the patient seen in the last year in this department? Yes    [facility]   12/14/21  [facility]   6/15/22    Does the patient have an active prescription (recently filled or refills available) for medication(s) requested? YES

## 2022-04-08 LAB
ALBUMIN SERPL-MCNC: 4.7 G/DL (ref 3.8–4.9)
ALBUMIN/GLOB SERPL: 2 {RATIO} (ref 1.2–2.2)
ALP SERPL-CCNC: 93 IU/L (ref 44–121)
ALT SERPL-CCNC: 6 IU/L (ref 0–32)
AST SERPL-CCNC: 22 IU/L (ref 0–40)
BASOPHILS # BLD AUTO: 0 X10E3/UL (ref 0–0.2)
BASOPHILS NFR BLD AUTO: 1 %
BILIRUB SERPL-MCNC: 0.4 MG/DL (ref 0–1.2)
BUN SERPL-MCNC: 11 MG/DL (ref 8–27)
BUN/CREAT SERPL: 14 (ref 12–28)
CALCIUM SERPL-MCNC: 9.5 MG/DL (ref 8.7–10.3)
CHLORIDE SERPL-SCNC: 103 MMOL/L (ref 96–106)
CO2 SERPL-SCNC: 25 MMOL/L (ref 20–29)
CREAT SERPL-MCNC: 0.78 MG/DL (ref 0.57–1)
EGFRCR SERPLBLD CKD-EPI 2021: 87 ML/MIN/1.73
EOSINOPHIL # BLD AUTO: 0.1 X10E3/UL (ref 0–0.4)
EOSINOPHIL NFR BLD AUTO: 3 %
ERYTHROCYTE [DISTWIDTH] IN BLOOD BY AUTOMATED COUNT: 11.7 % (ref 11.7–15.4)
GLOBULIN SER CALC-MCNC: 2.4 G/DL (ref 1.5–4.5)
GLUCOSE SERPL-MCNC: 94 MG/DL (ref 65–99)
HCT VFR BLD AUTO: 46.4 % (ref 34–46.6)
HGB BLD-MCNC: 16 G/DL (ref 11.1–15.9)
IMM GRANULOCYTES # BLD AUTO: 0 X10E3/UL (ref 0–0.1)
IMM GRANULOCYTES NFR BLD AUTO: 0 %
IMMATURE CELLS  115398: ABNORMAL
LYMPHOCYTES # BLD AUTO: 1.4 X10E3/UL (ref 0.7–3.1)
LYMPHOCYTES NFR BLD AUTO: 31 %
MCH RBC QN AUTO: 32.3 PG (ref 26.6–33)
MCHC RBC AUTO-ENTMCNC: 34.5 G/DL (ref 31.5–35.7)
MCV RBC AUTO: 94 FL (ref 79–97)
MONOCYTES # BLD AUTO: 0.4 X10E3/UL (ref 0.1–0.9)
MONOCYTES NFR BLD AUTO: 9 %
MORPHOLOGY BLD-IMP: ABNORMAL
NEUTROPHILS # BLD AUTO: 2.6 X10E3/UL (ref 1.4–7)
NEUTROPHILS NFR BLD AUTO: 56 %
NRBC BLD AUTO-RTO: ABNORMAL %
PLATELET # BLD AUTO: 272 X10E3/UL (ref 150–450)
POTASSIUM SERPL-SCNC: 4.1 MMOL/L (ref 3.5–5.2)
PROT SERPL-MCNC: 7.1 G/DL (ref 6–8.5)
RBC # BLD AUTO: 4.95 X10E6/UL (ref 3.77–5.28)
SODIUM SERPL-SCNC: 143 MMOL/L (ref 134–144)
TSH SERPL DL<=0.005 MIU/L-ACNC: 0.1 UIU/ML (ref 0.45–4.5)
WBC # BLD AUTO: 4.7 X10E3/UL (ref 3.4–10.8)

## 2022-04-20 DIAGNOSIS — G20.A1 PARKINSON'S DISEASE (HCC): ICD-10-CM

## 2022-05-01 LAB
APPEARANCE UR: CLEAR
BACTERIA UR CULT: NO GROWTH
BACTERIA UR CULT: NORMAL
BILIRUB UR QL STRIP: NEGATIVE
COLOR UR: YELLOW
GLUCOSE UR QL STRIP: NEGATIVE
HGB UR QL STRIP: NEGATIVE
KETONES UR QL STRIP: NEGATIVE
LEUKOCYTE ESTERASE UR QL STRIP: NEGATIVE
MICRO URNS: NORMAL
NITRITE UR QL STRIP: NEGATIVE
PH UR STRIP: 6.5 [PH] (ref 5–7.5)
PROT UR QL STRIP: NEGATIVE
SP GR UR STRIP: 1.01 (ref 1–1.03)
T4 FREE SERPL-MCNC: 1.48 NG/DL (ref 0.82–1.77)
TSH SERPL DL<=0.005 MIU/L-ACNC: 0.4 UIU/ML (ref 0.45–4.5)
UROBILINOGEN UR STRIP-MCNC: 0.2 MG/DL (ref 0.2–1)

## 2022-05-23 RX ORDER — LEVOTHYROXINE SODIUM 88 UG/1
TABLET ORAL
Qty: 90 TABLET | Refills: 0 | Status: SHIPPED | OUTPATIENT
Start: 2022-05-23 | End: 2022-08-15 | Stop reason: SDUPTHER

## 2022-06-15 ENCOUNTER — OFFICE VISIT (OUTPATIENT)
Dept: NEUROLOGY | Facility: MEDICAL CENTER | Age: 62
End: 2022-06-15
Attending: PSYCHIATRY & NEUROLOGY
Payer: COMMERCIAL

## 2022-06-15 VITALS
SYSTOLIC BLOOD PRESSURE: 98 MMHG | TEMPERATURE: 97.7 F | DIASTOLIC BLOOD PRESSURE: 60 MMHG | WEIGHT: 106.26 LBS | BODY MASS INDEX: 20.86 KG/M2 | HEART RATE: 105 BPM | HEIGHT: 60 IN | RESPIRATION RATE: 16 BRPM | OXYGEN SATURATION: 97 %

## 2022-06-15 DIAGNOSIS — G20.A1 PARKINSON'S DISEASE (HCC): ICD-10-CM

## 2022-06-15 PROCEDURE — 99215 OFFICE O/P EST HI 40 MIN: CPT | Performed by: PSYCHIATRY & NEUROLOGY

## 2022-06-15 PROCEDURE — 99212 OFFICE O/P EST SF 10 MIN: CPT | Performed by: PSYCHIATRY & NEUROLOGY

## 2022-06-15 ASSESSMENT — FIBROSIS 4 INDEX: FIB4 SCORE: 1.98

## 2022-06-15 NOTE — PROGRESS NOTES
"Chief Complaint   Patient presents with   • Follow-Up     Parkinson's disease       History of present illness:  Mariela Huff 61 y.o. adult with Parkinson's disease since 2017 with OFF toe curling dystonia s/p botox.       PD Meds:  Artane 2 mg PO TID at 7-8 pm, 1-2 pm and 7 pm- improved stiffness in right shoulder and hip.  Sinemet CR 25/100mg 1.5 tablet QAM 8am, 1.5 tablets at 1pm, and 1 tab at 6 pm  Sinemet immediate release 0.5 tabs every morning.      She has had right sided cramping and artane has been helpful for treatment of this. Her main issue is curling of her toes and turning inwards of her ankles first thing in the morning. This tends to occur before her first dose of sinemet or in between doses of sinemet.   The immediate release sinemet caused involuntary movement and poor coordination of her left leg. The CR sinemet does not cause this problem as much. The CR dose takes about 45 minutes to 1 hour to kick in.       12/14/21:  She has had bilateral toe curling as an OFF symptom. Currently with every 5 hour dosing of Sinemet CR she is not experiencing this however does have early morning dystonia. She has been unable to tolerate immediate release Sinemet due to what sounds like lower extremity dyskinesia. I have prescribed 1 tablet of Sinemet immediate release to be given in the morning for her early morning OFF. This is in addition to her current regimen of Sinemet CR.  She is H&Y stage 1. A potential adjunctive treatment is a dopamine agonist if fluctuations worsen. However currently with her current schedule of Sinemet CR this is not required.    6/15/22: She has had frequent headaches since the last visit. The headaches are located in the back of the head. She denies history of headache. She is able to function with her headache. The headaches were occurring in March/April but the last few weeks are improved.   Her gait will \"tighten up\" and she has trouble walking smoothly occasionally. " This may resolve on its own after resting but occasionally persists for hours. She also occasionally notices involuntary movements that do not impair her activities.     Movement-Specific ROS  Sleep: No problems    Constipation: Has a daily bowel movement    Urination: No   Dizziness: Has had vertigo intermittently in the last few months   Hallucinations: No    Cognition: Occasional word finding difficulty   Excessive daytime somnolence: She occasionally naps in the afternoon after her second dose of levodopa   Balance: No problems   Exercise: Does rock steady boxing 3 times per week and yoga several times weekly.     Past medical history:   Past Medical History:   Diagnosis Date   • Anesthesia     severe ponv, hard to wake up   • Arthritis     shoulders   • Chest tightness    • Cough    • Diverticulosis 2012    pt stated it was noted on colonoscopy   • Hypothyroid     hypothyroid   • Infectious disease     around bronchitis/flu 12/2011   • Lyme disease     chronic   • Pain 04/2018    right foot, bilateral shoulder   • Painful breathing    • Shortness of breath        Past surgical history:   Past Surgical History:   Procedure Laterality Date   • TOE FUSION Right 5/7/2018    Procedure: TOE FUSION - 1ST MTP;  Surgeon: Janes Benavidez M.D.;  Location: SURGERY SAME DAY [facility];  Service: Orthopedics   • REPAIR, TENDON, LOWER EXTREMITY, USING TENDON GRAFT Right 5/7/2018    Procedure: FLEXOR TENDON REPAIR - 4TH RELEASE W/SOFT TISSUE RELEASE 2/3;  Surgeon: Janes Benavidez M.D.;  Location: SURGERY SAME DAY [facility];  Service: Orthopedics   • HARDWARE REMOVAL ORTHO Right 5/7/2018    Procedure: HARDWARE REMOVAL ORTHO - INTERNAL FIXATION;  Surgeon: Janes Benavidez M.D.;  Location: SURGERY SAME DAY [facility];  Service: Orthopedics   • ORTHOPEDIC OSTEOTOMY Right 5/7/2018    Procedure: ORTHOPEDIC OSTEOTOMY - DISTAL METATARSAL 2/3/4;  Surgeon: Janes Benavidez M.D.;  Location: SURGERY SAME DAY [facility];   Service: Orthopedics   • BUNIONECTOMY Right 5/1/2017    Procedure: BUNIONECTOMY - PROXIMAL HALLUX VALGUS CORRECTION W/BONE GRAFT;  Surgeon: Janes Benavidez M.D.;  Location: SURGERY [facility];  Service:    • NEUROMA EXCISION  5/1/2017    Procedure: NEUROMA EXCISION - 2ND WEBSPACE;  Surgeon: Janes Benavidez M.D.;  Location: SURGERY [facility];  Service:    • BREAST IMPLANT REVISION Bilateral 4/11/2016    Procedure: BREAST IMPLANT EXCHANGE OF SALINE TO SILICONE W/REPOSITIONING OF LATERAL FOLDS;  Surgeon: Mikey Adkins M.D.;  Location: SURGERY [facility];  Service:    • BREAST BIOPSY  1/24/2012    Performed by SANDRA ESTRADA at SURGERY SAME DAY [facility]   • SHOULDER ARTHROSCOPY  12/1/2010    Performed by KATE CHANCE at SURGERY [facility]   • SHOULDER DECOMPRESSION  12/1/2010    Performed by KATE CHANCE at SURGERY [facility]   • ROTATOR CUFF REPAIR  12/1/2010    Performed by KATE CHANCE at SURGERY [facility]   • SHOULDER DECOMPRESSION ARTHROSCOPIC  5/13/2009    Performed by KATE CHANCE at SURGERY [facility]   • ROTATOR CUFF REPAIR  5/13/2009    Performed by KATE CHANCE at SURGERY [facility]   • HYSTERECTOMY, VAGINAL  1999    fibroids   • MS BREAST AUGMENTATION WITH IMPLANT Bilateral 1999   • OTHER ORTHOPEDIC SURGERY  1993    joint repair left thumb   • US-NEEDLE CORE BX-BREAST PANEL         Family history:   Family History   Problem Relation Age of Onset   • Cancer Paternal Aunt         breast   • Diabetes Mother    • Thyroid Mother    • Diabetes Father    • Thyroid Daughter    • Thyroid Sister    • Cancer Paternal Aunt         breast, uterine   • Cancer Paternal Aunt         bladder   • Cancer Paternal Aunt         colon   • Heart Disease Neg Hx    • Stroke Neg Hx    • Alcohol/Drug Neg Hx        Social history:   Social History     Socioeconomic History   • Marital status:      Spouse name: Not on file   • Number of children: Not on file   •  Years of education: Not on file   • Highest education level: Bachelor's degree (e.g., BA, AB, BS)   Occupational History   • Not on file   Tobacco Use   • Smoking status: Never Smoker   • Smokeless tobacco: Never Used   Vaping Use   • Vaping Use: Never used   Substance and Sexual Activity   • Alcohol use: Yes     Alcohol/week: 0.6 oz     Types: 1 Glasses of wine per week     Comment: Occasionally   • Drug use: No   • Sexual activity: Yes     Partners: Male   Other Topics Concern   • Not on file   Social History Narrative   • Not on file     Social Determinants of Health     Financial Resource Strain: Not on file   Food Insecurity: Not on file   Transportation Needs: Not on file   Physical Activity: Not on file   Stress: Not on file   Social Connections: Not on file   Intimate Partner Violence: Not on file   Housing Stability: Not on file       Current medications:   Current Outpatient Medications   Medication   • levothyroxine (SYNTHROID) 88 MCG Tab   • carbidopa-levodopa (SINEMET)  MG Tab   • trihexyphenidyl (ARTANE) 2 MG Tab   • azelastine (ASTELIN) 137 MCG/SPRAY nasal spray   • carbidopa-levodopa CR (SINEMET CR)  MG per tablet   • famotidine (PEPCID) 20 MG Tab   • Cyanocobalamin (B-12) 3000 MCG SL Tab   • Ascorbic Acid (VITAMIN C) 500 MG Chew Tab   • Cholecalciferol (VITAMIN D) 50 MCG (2000 UT) Cap     No current facility-administered medications for this visit.       Medication Allergy:  Allergies   Allergen Reactions   • Pcn [Penicillins] Rash     Rash     • Percocet [Oxycodone-Acetaminophen] Vomiting   • Symbicort [Budesonide-Formoterol Fumarate]      Laryngitis      Physical examination:   Vitals:    06/15/22 0914 06/15/22 0916   BP: 100/70 (!) 98/60   BP Location: Left arm Left arm   Patient Position: Sitting Standing   BP Cuff Size: Adult Adult   Pulse: 99 (!) 105   Resp: 16    Temp: 36.5 °C (97.7 °F)    TempSrc: Temporal    SpO2: 95% 97%   Weight: 48.2 kg (106 lb 4.2 oz)    Height: 1.524 m (5')       Neurological Exam  Mental Status  Awake and alert. Speech is normal. Language is fluent with no aphasia.  Normal facial expression .    Motor   Normal muscle tone. No abnormal involuntary movements.  Normal speed and amplitude of rapid finger and foot tapping, hand opening/closing and pronation/supination bilaterally.   .    Coordination  Right: Finger-to-nose normal. Rapid alternating movement normal.Left: Finger-to-nose normal. Rapid alternating movement normal.    Gait  Casual gait is normal including stance, stride, and arm swing.      Labs:  I reviewed the following labs personally:  None    Imaging:   None     ASSESSMENT AND PLAN:  Problem List Items Addressed This Visit     Parkinson's disease (HCC)          1. Parkinson's disease (HCC)    61-year-old female with Parkinson's disease, currently doing well.  Her examination in the exam room today is normal.  There is no bradykinesia or rigidity.  She does endorse that the other end of her dose occasionally there is slowness of the right side.  I have counseled her that she may take a tablet of 25/100 immediate release carbidopa/levodopa as a rescue dose for these infrequent OFF periods.   No changes were made to the scheduled doses of her CR carbidopa/levodopa.  I offered to prescribe Neupro, however given the mild nature of her symptoms we will hold off on this.  The patient does not wish to start additional drugs at the moment.  She is on trihexyphenidyl, for treatment of dystonia, and I decided to continue this today.  There are no significant cognitive side effects from this.  She is also young and likely will tolerate this well.    FOLLOW-UP:   Return in about 6 months (around 12/15/2022).    Total time spent for the day for this patient unrelated to procedure time is: 47 minutes. I spent 32 minutes in face to face time and I spent 10 minutes pre-charting and 5 minutes in post-visit documentation.      Dr. Andrez Cam D.O.  [facility]

## 2022-06-15 NOTE — PATIENT INSTRUCTIONS
No changes were made to your Parkinson's controlled release sinemet.     Take 1 tablet of immediate release carbidopa/levodopa as a rescue dose as needed for slow movement.

## 2022-08-02 DIAGNOSIS — E03.9 HYPOTHYROIDISM, UNSPECIFIED TYPE: ICD-10-CM

## 2022-08-02 NOTE — PROGRESS NOTES
Recheck thyroid    TSH was suppressed but T4 wnl. No dose changes made at the time. Still present?

## 2022-08-08 ENCOUNTER — APPOINTMENT (RX ONLY)
Dept: URBAN - METROPOLITAN AREA CLINIC 6 | Facility: CLINIC | Age: 62
Setting detail: DERMATOLOGY
End: 2022-08-08

## 2022-08-08 DIAGNOSIS — D22 MELANOCYTIC NEVI: ICD-10-CM

## 2022-08-08 DIAGNOSIS — L82.1 OTHER SEBORRHEIC KERATOSIS: ICD-10-CM

## 2022-08-08 DIAGNOSIS — L82.0 INFLAMED SEBORRHEIC KERATOSIS: ICD-10-CM

## 2022-08-08 DIAGNOSIS — L81.4 OTHER MELANIN HYPERPIGMENTATION: ICD-10-CM

## 2022-08-08 DIAGNOSIS — D18.0 HEMANGIOMA: ICD-10-CM

## 2022-08-08 DIAGNOSIS — L57.0 ACTINIC KERATOSIS: ICD-10-CM

## 2022-08-08 DIAGNOSIS — Z71.89 OTHER SPECIFIED COUNSELING: ICD-10-CM

## 2022-08-08 PROBLEM — D22.5 MELANOCYTIC NEVI OF TRUNK: Status: ACTIVE | Noted: 2022-08-08

## 2022-08-08 PROBLEM — D18.01 HEMANGIOMA OF SKIN AND SUBCUTANEOUS TISSUE: Status: ACTIVE | Noted: 2022-08-08

## 2022-08-08 PROCEDURE — 17110 DESTRUCTION B9 LES UP TO 14: CPT

## 2022-08-08 PROCEDURE — ? LIQUID NITROGEN

## 2022-08-08 PROCEDURE — ? SUNSCREEN TREATMENT REGIMEN

## 2022-08-08 PROCEDURE — ? SUNSCREEN RECOMMENDATIONS

## 2022-08-08 PROCEDURE — ? COUNSELING

## 2022-08-08 PROCEDURE — 17000 DESTRUCT PREMALG LESION: CPT | Mod: 59

## 2022-08-08 PROCEDURE — 99213 OFFICE O/P EST LOW 20 MIN: CPT | Mod: 25

## 2022-08-08 ASSESSMENT — LOCATION SIMPLE DESCRIPTION DERM
LOCATION SIMPLE: CHEST
LOCATION SIMPLE: ABDOMEN
LOCATION SIMPLE: RIGHT BREAST
LOCATION SIMPLE: LEFT HAND
LOCATION SIMPLE: RIGHT HAND
LOCATION SIMPLE: LEFT SHOULDER
LOCATION SIMPLE: LEFT ANTERIOR NECK
LOCATION SIMPLE: RIGHT LIP
LOCATION SIMPLE: LEFT CHEEK

## 2022-08-08 ASSESSMENT — LOCATION ZONE DERM
LOCATION ZONE: HAND
LOCATION ZONE: NECK
LOCATION ZONE: TRUNK
LOCATION ZONE: FACE
LOCATION ZONE: LIP
LOCATION ZONE: ARM

## 2022-08-08 ASSESSMENT — LOCATION DETAILED DESCRIPTION DERM
LOCATION DETAILED: RIGHT MEDIAL SUPERIOR CHEST
LOCATION DETAILED: RIGHT DORSAL MIDDLE METACARPOPHALANGEAL JOINT
LOCATION DETAILED: RIGHT UPPER CUTANEOUS LIP
LOCATION DETAILED: RIGHT RADIAL DORSAL HAND
LOCATION DETAILED: PERIUMBILICAL SKIN
LOCATION DETAILED: RIGHT MEDIAL BREAST 4-5:00 REGION
LOCATION DETAILED: LEFT ULNAR DORSAL HAND
LOCATION DETAILED: EPIGASTRIC SKIN
LOCATION DETAILED: LEFT INFERIOR MEDIAL MALAR CHEEK
LOCATION DETAILED: LEFT CLAVICULAR NECK
LOCATION DETAILED: LEFT ANTERIOR SHOULDER

## 2022-08-08 NOTE — PROCEDURE: LIQUID NITROGEN
Duration Of Freeze Thaw-Cycle (Seconds): 10
Consent: The patient's consent was obtained including but not limited to risks of crusting, scabbing, blistering, scarring, darker or lighter pigmentary change, recurrence, incomplete removal and infection.
Show Aperture Variable?: Yes
Render Note In Bullet Format When Appropriate: No
Number Of Freeze-Thaw Cycles: 2 freeze-thaw cycles
Detail Level: Detailed
Post-Care Instructions: I reviewed with the patient in detail post-care instructions. Patient is to wear sunprotection, and avoid picking at any of the treated lesions. Pt may apply Vaseline to crusted or scabbing areas.
Duration Of Freeze Thaw-Cycle (Seconds): 10-15
Spray Paint Text: The liquid nitrogen was applied to the skin utilizing a spray paint frosting technique.
Number Of Freeze-Thaw Cycles: 3 freeze-thaw cycles
Medical Necessity Information: It is in your best interest to select a reason for this procedure from the list below. All of these items fulfill various CMS LCD requirements except the new and changing color options.
Medical Necessity Clause: This procedure was medically necessary because the lesions that were treated were:
normal...

## 2022-08-13 LAB
T4 FREE SERPL-MCNC: 1.95 NG/DL (ref 0.82–1.77)
TSH SERPL DL<=0.005 MIU/L-ACNC: 0.14 UIU/ML (ref 0.45–4.5)

## 2022-08-15 RX ORDER — LEVOTHYROXINE SODIUM 0.07 MG/1
75 TABLET ORAL
Qty: 90 TABLET | Refills: 0 | Status: SHIPPED | OUTPATIENT
Start: 2022-08-15 | End: 2022-09-01 | Stop reason: SDUPTHER

## 2022-08-15 NOTE — PROGRESS NOTES
TSH suppressed, Free T4 elevated  Decrease levothyroxine to 75 mg daily  Recheck thyroid labs in 6-8 weeks

## 2022-09-01 RX ORDER — LEVOTHYROXINE SODIUM 0.07 MG/1
75 TABLET ORAL
Qty: 90 TABLET | Refills: 0 | Status: SHIPPED | OUTPATIENT
Start: 2022-09-01 | End: 2022-11-14

## 2022-09-01 NOTE — TELEPHONE ENCOUNTER
Received request via: Pharmacy    Was the patient seen in the last year in this department? Yes    Does the patient have an active prescription (recently filled or refills available) for medication(s) requested?  Yes but would like sent to new pharmacy

## 2022-10-05 ENCOUNTER — TELEPHONE (OUTPATIENT)
Dept: NEUROLOGY | Facility: MEDICAL CENTER | Age: 62
End: 2022-10-05
Payer: COMMERCIAL

## 2022-10-05 DIAGNOSIS — G20.A1 PARKINSON'S DISEASE (HCC): ICD-10-CM

## 2022-10-05 NOTE — TELEPHONE ENCOUNTER
Received request via: Patient    Was the patient seen in the last year in this department? Yes, Last Office Visit was 06/15/2022    Does the patient have an active prescription (recently filled or refills available) for medication(s) requested? Yes, patient request to have carbidopa-levodopa CR (SINEMET CR)  MG per tablet  sent ti [facility] and needs a new script sent to [facility] to fill.

## 2022-10-06 DIAGNOSIS — E03.9 HYPOTHYROIDISM, UNSPECIFIED TYPE: ICD-10-CM

## 2022-10-10 ENCOUNTER — HOSPITAL ENCOUNTER (OUTPATIENT)
Dept: RADIOLOGY | Facility: MEDICAL CENTER | Age: 62
End: 2022-10-10
Attending: FAMILY MEDICINE
Payer: COMMERCIAL

## 2022-10-10 DIAGNOSIS — Z12.31 VISIT FOR SCREENING MAMMOGRAM: ICD-10-CM

## 2022-10-11 DIAGNOSIS — G20.A1 PARKINSON'S DISEASE (HCC): ICD-10-CM

## 2022-10-11 RX ORDER — CARBIDOPA AND LEVODOPA 25; 100 MG/1; MG/1
1.5 TABLET, EXTENDED RELEASE ORAL 3 TIMES DAILY
Qty: 405 TABLET | Refills: 2 | Status: SHIPPED | OUTPATIENT
Start: 2022-10-11 | End: 2022-11-02 | Stop reason: SDUPTHER

## 2022-10-11 NOTE — TELEPHONE ENCOUNTER
Received request via: Patient    Was the patient seen in the last year in this department? Yes    Does the patient have an active prescription (recently filled or refills available) for medication(s) requested? No        carbidopa-levodopa (SINEMET)  MG     Patient Comment: I’ve been filling this prescription at [facility] locally but would like that switched to [facility] - 90 day supply. Thank you.     Preferred pharmacy: [facility] - [address]

## 2022-10-12 ENCOUNTER — APPOINTMENT (OUTPATIENT)
Dept: RADIOLOGY | Facility: MEDICAL CENTER | Age: 62
End: 2022-10-12
Attending: FAMILY MEDICINE

## 2022-10-19 ENCOUNTER — HOSPITAL ENCOUNTER (OUTPATIENT)
Dept: LAB | Facility: MEDICAL CENTER | Age: 62
End: 2022-10-19
Attending: FAMILY MEDICINE
Payer: COMMERCIAL

## 2022-10-19 DIAGNOSIS — E03.9 HYPOTHYROIDISM, UNSPECIFIED TYPE: ICD-10-CM

## 2022-10-19 LAB
T4 FREE SERPL-MCNC: 1.54 NG/DL (ref 0.93–1.7)
TSH SERPL DL<=0.005 MIU/L-ACNC: 0.24 UIU/ML (ref 0.38–5.33)

## 2022-10-19 PROCEDURE — 84443 ASSAY THYROID STIM HORMONE: CPT

## 2022-10-19 PROCEDURE — 84439 ASSAY OF FREE THYROXINE: CPT

## 2022-10-19 PROCEDURE — 36415 COLL VENOUS BLD VENIPUNCTURE: CPT

## 2022-10-25 ENCOUNTER — HOSPITAL ENCOUNTER (OUTPATIENT)
Dept: RADIOLOGY | Facility: MEDICAL CENTER | Age: 62
End: 2022-10-25
Attending: FAMILY MEDICINE
Payer: COMMERCIAL

## 2022-10-25 DIAGNOSIS — N64.59 BREAST SIGNS AND SYMPTOMS: ICD-10-CM

## 2022-10-25 PROCEDURE — G0279 TOMOSYNTHESIS, MAMMO: HCPCS

## 2022-11-01 PROBLEM — M75.41 SUBACROMIAL IMPINGEMENT OF RIGHT SHOULDER: Status: ACTIVE | Noted: 2022-11-01

## 2022-11-01 PROBLEM — S46.011A TRAUMATIC TEAR OF RIGHT ROTATOR CUFF, INITIAL ENCOUNTER: Status: ACTIVE | Noted: 2022-11-01

## 2022-11-01 PROBLEM — M75.21 BICEPS TENDONITIS ON RIGHT: Status: ACTIVE | Noted: 2022-11-01

## 2022-11-02 ENCOUNTER — OFFICE VISIT (OUTPATIENT)
Dept: NEUROLOGY | Facility: MEDICAL CENTER | Age: 62
End: 2022-11-02
Attending: PSYCHIATRY & NEUROLOGY
Payer: COMMERCIAL

## 2022-11-02 VITALS
OXYGEN SATURATION: 98 % | DIASTOLIC BLOOD PRESSURE: 86 MMHG | RESPIRATION RATE: 18 BRPM | HEIGHT: 60 IN | TEMPERATURE: 98.6 F | SYSTOLIC BLOOD PRESSURE: 118 MMHG | WEIGHT: 105.82 LBS | HEART RATE: 98 BPM | BODY MASS INDEX: 20.78 KG/M2

## 2022-11-02 DIAGNOSIS — G24.9 DYSTONIA OF FOOT: ICD-10-CM

## 2022-11-02 DIAGNOSIS — G20.A1 PARKINSON'S DISEASE (HCC): ICD-10-CM

## 2022-11-02 PROCEDURE — 99214 OFFICE O/P EST MOD 30 MIN: CPT | Performed by: PSYCHIATRY & NEUROLOGY

## 2022-11-02 RX ORDER — CARBIDOPA AND LEVODOPA 25; 100 MG/1; MG/1
TABLET, EXTENDED RELEASE ORAL
Qty: 495 TABLET | Refills: 2 | Status: SHIPPED | OUTPATIENT
Start: 2022-11-02 | End: 2023-11-30

## 2022-11-02 ASSESSMENT — FIBROSIS 4 INDEX: FIB4 SCORE: 2.01

## 2022-11-02 NOTE — PATIENT INSTRUCTIONS
Increase the immediate release carbidopa/levodopa in the morning to 1.5 tabs     Change the sinemet CR to 1.5 tabs every 4 hours at 8am/12pm/4pm and 1 tab at 8pm prior to bedtime     Wean off of artane. Lower to 1mg three times daily for 2 weeks. If this does not result in worsening of the dystonia, discontinue the drug.

## 2022-11-02 NOTE — PROGRESS NOTES
"Chief Complaint   Patient presents with    Follow-Up     Parkinson's disease        History of present illness:  Mariela Huff 62 y.o. adult with Parkinson's disease since 2017 with OFF toe curling dystonia s/p botox.       PD Meds:  Artane 2 mg PO TID at 7-8 pm, 1-2 pm and 7 pm- improved stiffness in right shoulder and hip.  Sinemet CR 25/100mg 1.5 tablet QAM 8am, 1.5 tablets at 1pm, and 1 tab at 6 pm  Sinemet immediate release 0.5 tabs every morning.      She has had right sided cramping and artane has been helpful for treatment of this. Her main issue is curling of her toes and turning inwards of her ankles first thing in the morning. This tends to occur before her first dose of sinemet or in between doses of sinemet.   The immediate release sinemet caused involuntary movement and poor coordination of her left leg. The CR sinemet does not cause this problem as much. The CR dose takes about 45 minutes to 1 hour to kick in.        12/14/21:  She has had bilateral toe curling as an OFF symptom. Currently with every 5 hour dosing of Sinemet CR she is not experiencing this however does have early morning dystonia. She has been unable to tolerate immediate release Sinemet due to what sounds like lower extremity dyskinesia. I have prescribed 1 tablet of Sinemet immediate release to be given in the morning for her early morning OFF. This is in addition to her current regimen of Sinemet CR.  She is H&Y stage 1. A potential adjunctive treatment is a dopamine agonist if fluctuations worsen. However currently with her current schedule of Sinemet CR this is not required.     6/15/22: She has had frequent headaches since the last visit. The headaches are located in the back of the head. She denies history of headache. She is able to function with her headache. The headaches were occurring in March/April but the last few weeks are improved.   Her gait will \"tighten up\" and she has trouble walking smoothly " occasionally. This may resolve on its own after resting but occasionally persists for hours. She also occasionally notices involuntary movements that do not impair her activities.      11/2/22: She continues to experience the dystonia in her feet in the morning. After her first dose in the AM, the dystonia of the foot resolved. It takes 45 minutes to 1 hour for that first dose to kick in.     Movement-Specific ROS  Sleep: No problems    Constipation: Has a daily bowel movement    Urination: No   Dizziness: Has had vertigo intermittently in the last few months   Hallucinations: No    Cognition: Occasional word finding difficulty   Excessive daytime somnolence: She occasionally naps in the afternoon after her second dose of levodopa   Balance: No problems   Exercise: Does rock steady boxing 3 times per week and yoga several times weekly.         Past medical history:   Past Medical History:   Diagnosis Date    Anesthesia     severe ponv, hard to wake up    Arthritis     shoulders    Chest tightness     Cough     Diverticulosis 2012    pt stated it was noted on colonoscopy    Hypothyroid     hypothyroid    Infectious disease     around bronchitis/flu 12/2011    Lyme disease     chronic    Pain 04/2018    right foot, bilateral shoulder    Painful breathing     Shortness of breath        Past surgical history:   Past Surgical History:   Procedure Laterality Date    TOE FUSION Right 5/7/2018    Procedure: TOE FUSION - 1ST MTP;  Surgeon: Janes Benavidez M.D.;  Location: SURGERY SAME DAY [facility];  Service: Orthopedics    REPAIR, TENDON, LOWER EXTREMITY, USING TENDON GRAFT Right 5/7/2018    Procedure: FLEXOR TENDON REPAIR - 4TH RELEASE W/SOFT TISSUE RELEASE 2/3;  Surgeon: Janes Benavidez M.D.;  Location: SURGERY SAME DAY [facility];  Service: Orthopedics    HARDWARE REMOVAL ORTHO Right 5/7/2018    Procedure: HARDWARE REMOVAL ORTHO - INTERNAL FIXATION;  Surgeon: Janes Benavidez M.D.;  Location: SURGERY SAME DAY  [facility];  Service: Orthopedics    ORTHOPEDIC OSTEOTOMY Right 5/7/2018    Procedure: ORTHOPEDIC OSTEOTOMY - DISTAL METATARSAL 2/3/4;  Surgeon: Janes Benavidez M.D.;  Location: SURGERY SAME DAY [facility];  Service: Orthopedics    BUNIONECTOMY Right 5/1/2017    Procedure: BUNIONECTOMY - PROXIMAL HALLUX VALGUS CORRECTION W/BONE GRAFT;  Surgeon: Janes Benavidez M.D.;  Location: SURGERY [facility];  Service:     NEUROMA EXCISION  5/1/2017    Procedure: NEUROMA EXCISION - 2ND WEBSPACE;  Surgeon: Janes Benavidez M.D.;  Location: SURGERY [facility];  Service:     BREAST IMPLANT REVISION Bilateral 4/11/2016    Procedure: BREAST IMPLANT EXCHANGE OF SALINE TO SILICONE W/REPOSITIONING OF LATERAL FOLDS;  Surgeon: Mikey Adkins M.D.;  Location: SURGERY [facility];  Service:     BREAST BIOPSY  1/24/2012    Performed by SANDRA ESTRADA at SURGERY SAME DAY [facility]    SHOULDER ARTHROSCOPY  12/1/2010    Performed by KATE CHANCE at SURGERY [facility]    SHOULDER DECOMPRESSION  12/1/2010    Performed by KATE CHANCE at SURGERY [facility]    ROTATOR CUFF REPAIR  12/1/2010    Performed by KATE CHANCE at SURGERY [facility]    SHOULDER DECOMPRESSION ARTHROSCOPIC  5/13/2009    Performed by KATE CHANCE at SURGERY [facility]    ROTATOR CUFF REPAIR  5/13/2009    Performed by KATE CHANCE at SURGERY [facility]    HYSTERECTOMY, VAGINAL  1999    fibroids    OH BREAST AUGMENTATION WITH IMPLANT Bilateral 1999    OTHER ORTHOPEDIC SURGERY  1993    joint repair left thumb    US-NEEDLE CORE BX-BREAST PANEL         Family history:   Family History   Problem Relation Age of Onset    Cancer Paternal Aunt         breast    Diabetes Mother     Thyroid Mother     Diabetes Father     Thyroid Daughter     Thyroid Sister     Cancer Paternal Aunt         breast, uterine    Cancer Paternal Aunt         bladder    Cancer Paternal Aunt         colon    Heart Disease Neg Hx     Stroke Neg  Hx     Alcohol/Drug Neg Hx        Social history:   Social History     Socioeconomic History    Marital status:      Spouse name: Not on file    Number of children: Not on file    Years of education: Not on file    Highest education level: Bachelor's degree (e.g., BA, AB, BS)   Occupational History    Not on file   Tobacco Use    Smoking status: Never    Smokeless tobacco: Never   Vaping Use    Vaping Use: Never used   Substance and Sexual Activity    Alcohol use: Yes     Alcohol/week: 0.6 oz     Types: 1 Glasses of wine per week     Comment: Occasionally    Drug use: No    Sexual activity: Yes     Partners: Male   Other Topics Concern    Not on file   Social History Narrative    Not on file     Social Determinants of Health     Financial Resource Strain: Not on file   Food Insecurity: Not on file   Transportation Needs: Not on file   Physical Activity: Not on file   Stress: Not on file   Social Connections: Not on file   Intimate Partner Violence: Not on file   Housing Stability: Not on file       Current medications:   Current Outpatient Medications   Medication    carbidopa-levodopa CR (SINEMET CR)  MG per tablet    carbidopa-levodopa (SINEMET)  MG Tab    levothyroxine (SYNTHROID) 75 MCG Tab    trihexyphenidyl (ARTANE) 2 MG Tab    famotidine (PEPCID) 20 MG Tab    Cyanocobalamin (B-12) 3000 MCG SL Tab    Ascorbic Acid (VITAMIN C) 500 MG Chew Tab    Cholecalciferol (VITAMIN D) 50 MCG (2000 UT) Cap    meloxicam (MOBIC) 7.5 MG Tab     No current facility-administered medications for this visit.       Medication Allergy:  Allergies   Allergen Reactions    Pcn [Penicillins] Rash     Rash      Percocet [Oxycodone-Acetaminophen] Vomiting    Symbicort [Budesonide-Formoterol Fumarate]      Laryngitis        Physical examination:   Vitals:    11/02/22 1617 11/02/22 1620   BP: 118/86 118/86   BP Location: Right arm    Patient Position: Sitting    BP Cuff Size: Adult    Pulse: 92 98   Resp: 18    Temp: 37  °C (98.6 °F)    TempSrc: Temporal    SpO2: 98%    Weight: 48 kg (105 lb 13.1 oz)    Height: 1.524 m (5')      Neurological Exam    Gait    Normal facial expression and normal spontaneous movement.  There is mild inward turning of the right foot and flexion of the toes when she ambulates.  There is no bradykinesia or freezing of gait.  Her speed of movement is normal..     Labs:  I reviewed the following labs personally:  None    Imaging:   None     ASSESSMENT AND PLAN:  Problem List Items Addressed This Visit       Parkinson's disease (HCC)    Relevant Medications    carbidopa-levodopa CR (SINEMET CR)  MG per tablet    carbidopa-levodopa (SINEMET)  MG Tab       1. Parkinson's disease (HCC)  - carbidopa-levodopa CR (SINEMET CR)  MG per tablet; Take 1.5 Tablets by mouth 3 times a day AND 1 Tablet every evening.  Dispense: 495 Tablet; Refill: 2  - carbidopa-levodopa (SINEMET)  MG Tab; Take 1.5 Tablets by mouth every morning.  Dispense: 135 Tablet; Refill: 2    Patient is providing a history of off dystonia that is bothersome, causing her foot to turned inwards and cramp.  It is worse in the morning and at the end of her afternoon doses.  I have counseled her to increase the dose of the first morning dose of immediate release carbidopa levodopa so that it kicks in faster.  She will increase to 1-1/2 tablets once daily.  The patient will also increase the frequency of the carbidopa levodopa CR to 4 times daily, with 1 is 1/2 tablets 3 times daily and 1 tablet in the evening.  Will wean off of trihexyphenidyl, given that she is having dry mouth and possible brain fog as side effects.  I have counseled her on the potential of the dystonia to worsen after discontinuation of Artane.    FOLLOW-UP:   Return in about 6 months (around 5/2/2023).    Total time spent for the day for this patient unrelated to procedure time is: 33 minutes. I spent 27 minutes in face to face time and I spent 3 minutes  pre-charting and 3 minutes in post-visit documentation.      Dr. Andrez Cam D.O.  [facility]

## 2022-11-14 RX ORDER — LEVOTHYROXINE SODIUM 0.07 MG/1
TABLET ORAL
Qty: 90 TABLET | Refills: 0 | Status: SHIPPED | OUTPATIENT
Start: 2022-11-14 | End: 2023-02-13

## 2022-11-14 NOTE — TELEPHONE ENCOUNTER
Received request via: Pharmacy    Was the patient seen in the last year in this department? Yes    Does the patient have an active prescription (recently filled or refills available) for medication(s) requested? No    Does the patient have detention Plus and need 100 day supply (blood pressure, diabetes and cholesterol meds only)? Patient does not have SCP

## 2022-12-14 PROBLEM — M75.101 ROTATOR CUFF TEAR, RIGHT: Status: ACTIVE | Noted: 2022-12-14

## 2022-12-14 PROBLEM — R11.2 PONV (POSTOPERATIVE NAUSEA AND VOMITING): Status: ACTIVE | Noted: 2022-12-14

## 2022-12-14 PROBLEM — Z98.890 PONV (POSTOPERATIVE NAUSEA AND VOMITING): Status: ACTIVE | Noted: 2022-12-14

## 2022-12-19 LAB
ALBUMIN SERPL BCP-MCNC: 4.3 G/DL (ref 3.2–4.9)
ALBUMIN/GLOB SERPL: 1.5 G/DL
ALP SERPL-CCNC: 141 U/L
ALT SERPL-CCNC: 6 U/L (ref 2–50)
ANION GAP SERPL CALC-SCNC: 9 MMOL/L (ref 7–16)
AST SERPL-CCNC: 45 U/L (ref 12–45)
BASOPHILS # BLD AUTO: 0.6 % (ref 0–1.8)
BASOPHILS # BLD: 0.04 K/UL (ref 0–0.12)
BILIRUB SERPL-MCNC: 0.3 MG/DL (ref 0.1–1.5)
BUN SERPL-MCNC: 17 MG/DL (ref 8–22)
CALCIUM ALBUM COR SERPL-MCNC: 9.7 MG/DL (ref 8.5–10.5)
CALCIUM SERPL-MCNC: 9.9 MG/DL (ref 8.5–10.5)
CHLORIDE SERPL-SCNC: 102 MMOL/L (ref 96–112)
CO2 SERPL-SCNC: 28 MMOL/L (ref 20–33)
CREAT SERPL-MCNC: 0.72 MG/DL (ref 0.5–1.4)
EOSINOPHIL # BLD AUTO: 0.1 K/UL (ref 0–0.51)
EOSINOPHIL NFR BLD: 1.6 % (ref 0–6.9)
ERYTHROCYTE [DISTWIDTH] IN BLOOD BY AUTOMATED COUNT: 40.5 FL (ref 35.9–50)
GFR SERPLBLD CREATININE-BSD FMLA CKD-EPI: 104 ML/MIN/1.73 M 2
GLOBULIN SER CALC-MCNC: 2.9 G/DL (ref 1.9–3.5)
GLUCOSE SERPL-MCNC: 107 MG/DL (ref 65–99)
HCT VFR BLD AUTO: 42 % (ref 42–52)
HGB BLD-MCNC: 14.3 G/DL (ref 14–18)
IMM GRANULOCYTES # BLD AUTO: 0.03 K/UL (ref 0–0.11)
IMM GRANULOCYTES NFR BLD AUTO: 0.5 % (ref 0–0.9)
LIPASE SERPL-CCNC: 25 U/L (ref 11–82)
LYMPHOCYTES # BLD AUTO: 1.12 K/UL (ref 1–4.8)
LYMPHOCYTES NFR BLD: 17.9 % (ref 22–41)
MCH RBC QN AUTO: 31.8 PG (ref 27–33)
MCHC RBC AUTO-ENTMCNC: 34 G/DL (ref 33.6–35)
MCV RBC AUTO: 93.3 FL (ref 81.4–97.8)
MONOCYTES # BLD AUTO: 0.47 K/UL (ref 0–0.85)
MONOCYTES NFR BLD AUTO: 7.5 % (ref 0–13.4)
NEUTROPHILS # BLD AUTO: 4.49 K/UL (ref 1.82–7.42)
NEUTROPHILS NFR BLD: 71.9 % (ref 44–72)
NRBC # BLD AUTO: 0 K/UL
NRBC BLD-RTO: 0 /100 WBC
PLATELET # BLD AUTO: 245 K/UL (ref 164–446)
PMV BLD AUTO: 9.7 FL (ref 9–12.9)
POTASSIUM SERPL-SCNC: 3.9 MMOL/L (ref 3.6–5.5)
PROT SERPL-MCNC: 7.2 G/DL (ref 6–8.2)
RBC # BLD AUTO: 4.5 M/UL (ref 4.7–6.1)
SODIUM SERPL-SCNC: 139 MMOL/L (ref 135–145)
WBC # BLD AUTO: 6.3 K/UL (ref 4.8–10.8)

## 2022-12-19 PROCEDURE — 36415 COLL VENOUS BLD VENIPUNCTURE: CPT

## 2022-12-19 PROCEDURE — 84100 ASSAY OF PHOSPHORUS: CPT

## 2022-12-19 PROCEDURE — 85379 FIBRIN DEGRADATION QUANT: CPT

## 2022-12-19 PROCEDURE — 80053 COMPREHEN METABOLIC PANEL: CPT

## 2022-12-19 PROCEDURE — 85025 COMPLETE CBC W/AUTO DIFF WBC: CPT

## 2022-12-19 PROCEDURE — 99285 EMERGENCY DEPT VISIT HI MDM: CPT

## 2022-12-19 PROCEDURE — 83690 ASSAY OF LIPASE: CPT

## 2022-12-19 ASSESSMENT — FIBROSIS 4 INDEX: FIB4 SCORE: 2.01

## 2022-12-20 ENCOUNTER — HOSPITAL ENCOUNTER (EMERGENCY)
Facility: MEDICAL CENTER | Age: 62
End: 2022-12-20
Attending: EMERGENCY MEDICINE
Payer: COMMERCIAL

## 2022-12-20 ENCOUNTER — APPOINTMENT (OUTPATIENT)
Dept: RADIOLOGY | Facility: MEDICAL CENTER | Age: 62
End: 2022-12-20
Attending: EMERGENCY MEDICINE
Payer: COMMERCIAL

## 2022-12-20 VITALS
SYSTOLIC BLOOD PRESSURE: 119 MMHG | TEMPERATURE: 97.8 F | RESPIRATION RATE: 16 BRPM | OXYGEN SATURATION: 94 % | DIASTOLIC BLOOD PRESSURE: 75 MMHG | HEART RATE: 81 BPM | HEIGHT: 60 IN | WEIGHT: 105 LBS | BODY MASS INDEX: 20.62 KG/M2

## 2022-12-20 DIAGNOSIS — G89.18 POST-OPERATIVE PAIN: ICD-10-CM

## 2022-12-20 DIAGNOSIS — G20.A1 PARKINSON'S DISEASE (HCC): ICD-10-CM

## 2022-12-20 DIAGNOSIS — R25.1 TREMOR: Primary | ICD-10-CM

## 2022-12-20 DIAGNOSIS — R07.9 CHEST PAIN, UNSPECIFIED TYPE: ICD-10-CM

## 2022-12-20 DIAGNOSIS — G25.3 MYOCLONIC JERKING: ICD-10-CM

## 2022-12-20 LAB
APPEARANCE UR: CLEAR
BACTERIA #/AREA URNS HPF: NEGATIVE /HPF
BILIRUB UR QL STRIP.AUTO: NEGATIVE
COLOR UR: YELLOW
D DIMER PPP IA.FEU-MCNC: 1.2 UG/ML (FEU) (ref 0–0.5)
EKG IMPRESSION: NORMAL
EPI CELLS #/AREA URNS HPF: NEGATIVE /HPF
GLUCOSE UR STRIP.AUTO-MCNC: NEGATIVE MG/DL
HYALINE CASTS #/AREA URNS LPF: ABNORMAL /LPF
KETONES UR STRIP.AUTO-MCNC: NEGATIVE MG/DL
LEUKOCYTE ESTERASE UR QL STRIP.AUTO: ABNORMAL
MICRO URNS: ABNORMAL
NITRITE UR QL STRIP.AUTO: NEGATIVE
PH UR STRIP.AUTO: 7.5 [PH] (ref 5–8)
PHOSPHATE SERPL-MCNC: 3.6 MG/DL (ref 2.5–4.5)
PROT UR QL STRIP: NEGATIVE MG/DL
RBC # URNS HPF: ABNORMAL /HPF
RBC UR QL AUTO: NEGATIVE
SP GR UR STRIP.AUTO: 1
TROPONIN T SERPL-MCNC: <6 NG/L (ref 6–19)
UROBILINOGEN UR STRIP.AUTO-MCNC: 0.2 MG/DL
WBC #/AREA URNS HPF: ABNORMAL /HPF

## 2022-12-20 PROCEDURE — 93005 ELECTROCARDIOGRAM TRACING: CPT | Performed by: EMERGENCY MEDICINE

## 2022-12-20 PROCEDURE — 36415 COLL VENOUS BLD VENIPUNCTURE: CPT

## 2022-12-20 PROCEDURE — A9270 NON-COVERED ITEM OR SERVICE: HCPCS

## 2022-12-20 PROCEDURE — 700117 HCHG RX CONTRAST REV CODE 255: Performed by: EMERGENCY MEDICINE

## 2022-12-20 PROCEDURE — 81001 URINALYSIS AUTO W/SCOPE: CPT

## 2022-12-20 PROCEDURE — 96374 THER/PROPH/DIAG INJ IV PUSH: CPT

## 2022-12-20 PROCEDURE — 84484 ASSAY OF TROPONIN QUANT: CPT

## 2022-12-20 PROCEDURE — 71045 X-RAY EXAM CHEST 1 VIEW: CPT

## 2022-12-20 PROCEDURE — 700111 HCHG RX REV CODE 636 W/ 250 OVERRIDE (IP)

## 2022-12-20 PROCEDURE — 71275 CT ANGIOGRAPHY CHEST: CPT

## 2022-12-20 PROCEDURE — 700102 HCHG RX REV CODE 250 W/ 637 OVERRIDE(OP)

## 2022-12-20 RX ORDER — ACETAMINOPHEN 500 MG
1000 TABLET ORAL ONCE
Status: DISCONTINUED | OUTPATIENT
Start: 2022-12-20 | End: 2022-12-20

## 2022-12-20 RX ORDER — LORAZEPAM 2 MG/ML
1 INJECTION INTRAMUSCULAR ONCE
Status: COMPLETED | OUTPATIENT
Start: 2022-12-20 | End: 2022-12-20

## 2022-12-20 RX ORDER — ACETAMINOPHEN 500 MG
1000 TABLET ORAL ONCE
Status: COMPLETED | OUTPATIENT
Start: 2022-12-20 | End: 2022-12-20

## 2022-12-20 RX ORDER — DIAZEPAM 5 MG/1
2.5 TABLET ORAL EVERY 6 HOURS PRN
Qty: 5 TABLET | Refills: 0 | Status: SHIPPED | OUTPATIENT
Start: 2022-12-20 | End: 2022-12-23

## 2022-12-20 RX ORDER — LORAZEPAM 2 MG/ML
0.5 INJECTION INTRAMUSCULAR ONCE
Status: DISCONTINUED | OUTPATIENT
Start: 2022-12-20 | End: 2022-12-20

## 2022-12-20 RX ORDER — HYDROCODONE BITARTRATE AND ACETAMINOPHEN 5; 325 MG/1; MG/1
1 TABLET ORAL ONCE
Status: DISCONTINUED | OUTPATIENT
Start: 2022-12-20 | End: 2022-12-20

## 2022-12-20 RX ADMIN — ACETAMINOPHEN 1000 MG: 500 TABLET ORAL at 03:39

## 2022-12-20 RX ADMIN — IOHEXOL 40 ML: 350 INJECTION, SOLUTION INTRAVENOUS at 05:45

## 2022-12-20 RX ADMIN — LORAZEPAM 1 MG: 2 INJECTION INTRAMUSCULAR; INTRAVENOUS at 04:50

## 2022-12-20 NOTE — ED TRIAGE NOTES
Chief Complaint   Patient presents with    Nausea    Abdominal Pain     Left sided abdominal pain    Tremors     Intermittent        63 yo to triage for above complaint. Pt had rotator cuff repair on 12/14. States she was constipated following surgery but has been having regular BMs for the last couple of days. Protocol ordered.      Pt placed in lobby. Pt educated on triage process. Pt encouraged to alert staff for any changes.     Patient and staff wearing appropriate PPE    /83   Pulse 94   Temp 36.8 °C (98.3 °F) (Temporal)   Resp 18   Ht 1.524 m (5')   Wt 47.6 kg (105 lb)   SpO2 94%   BMI 20.51 kg/m²

## 2022-12-20 NOTE — ED PROVIDER NOTES
ER Provider Note    Scribed for Jacques Tamidonitaia by Kota Willingham. 12/20/2022  1:43 AM    Primary Care Provider: Abi Bragg M.D.  Means of Arrival: Walk-In  History obtained from: Patient    CHIEF COMPLAINT  Chief Complaint   Patient presents with    Nausea    Abdominal Pain     Left sided abdominal pain    Tremors     Intermittent      LIMITATION TO HISTORY   Select: : None    HPI  Mariela Huff is a 62 y.o. adult with a history of Parkinson's disease who presents to the ED via EMS complaining of intermittent generalized shaking episodes onset earlier today. She notes that the sensation is like a generalized tightness on both sides of her body which she is fully conscious for. She states that her shaking episode lasted for approximately 45 minutes, and was alleviated by EMS administration of Fentanyl for her pain and Zofran for her nausea. Mariela states they also have left lower quadrant abdominal pain, right shoulder pain, nausea, and chest aching sensation during the shaking, but denies any shortness of breath, vomiting, diarrhea, or fevers. She has been taking Colace, Senacot, and Milk of magnesia to alleviate her constipation since surgery. She admits to recreational alcohol use a couple times a month. She denies any history of similar symptoms, but endorses a history of Parkinson's disease. She regularly takes Carbidopa-Levadopa, and notes that she took her medication today at 0100. She takes 3 doses per day. She states that she does not typically have tremors even when she misses her medications. Her additional daily medications include Synthroid, Meloxicam, and Pepcid. She has not changed any of her medications recently.    OUTSIDE HISTORIAN(S):  None    EXTERNAL RECORDS REVIEWED  Select: Other Operative note detailing right rotator cuff repair . Also outpatient Neurology notes discussing Parkinson's disease.    REVIEW OF SYSTEMS  Pertinent positives include intermittent generalized  shaking episodes, left lower quadrant abdominal pain, right shoulder pain, nausea, and chest aching sensation during the shaking. Pertinent negatives include no shortness of breath, vomiting, diarrhea, or fevers.  All other systems reviewed and negative.     PAST MEDICAL HISTORY  Past Medical History:   Diagnosis Date    Anesthesia     severe ponv, hard to wake up    Arthritis     shoulders    Chest tightness     Cough     Diverticulosis 2012    pt stated it was noted on colonoscopy    Hypothyroid     hypothyroid    Infectious disease     around bronchitis/flu 12/2011    Lyme disease     chronic    Pain 04/2018    right foot, bilateral shoulder    Painful breathing     Shortness of breath        SURGICAL HISTORY  Past Surgical History:   Procedure Laterality Date    PB SHLDR ARTHROSCOP,SURG,W/ROTAT CUFF REPB Right 12/14/2022    Procedure: RIGHT SHOULDER ARTHROSCOPY ROTATOR CUFF REPAIR;  Surgeon: Mildred Hu M.D.;  Location: [facility];  Service: Orthopedics    PB REPAIR BICEPS LONG TENDON Right 12/14/2022    Procedure: RIGHT BICEPS TENODESIS;  Surgeon: Mildred Hu M.D.;  Location: [facility];  Service: Orthopedics    VT SHLDR ARTHROSCOP,PART ACROMIOPLAS Right 12/14/2022    Procedure: RIGHT SUBACROMIAL DECOMPRESSION, LABRAL DEBRIDEMENT, REPAIRS AS INDICATED;  Surgeon: Mildred Hu M.D.;  Location: [facility];  Service: Orthopedics    TOE FUSION Right 5/7/2018    Procedure: TOE FUSION - 1ST MTP;  Surgeon: Janes Benavidez M.D.;  Location: SURGERY SAME DAY [facility];  Service: Orthopedics    REPAIR, TENDON, LOWER EXTREMITY, USING TENDON GRAFT Right 5/7/2018    Procedure: FLEXOR TENDON REPAIR - 4TH RELEASE W/SOFT TISSUE RELEASE 2/3;  Surgeon: Janes Benavidez M.D.;  Location: SURGERY SAME DAY [facility];  Service: Orthopedics    HARDWARE REMOVAL ORTHO Right 5/7/2018    Procedure: HARDWARE REMOVAL ORTHO - INTERNAL FIXATION;  Surgeon:  Janes Benavidez M.D.;  Location: SURGERY SAME DAY [facility];  Service: Orthopedics    ORTHOPEDIC OSTEOTOMY Right 5/7/2018    Procedure: ORTHOPEDIC OSTEOTOMY - DISTAL METATARSAL 2/3/4;  Surgeon: Janes Benavidez M.D.;  Location: SURGERY SAME DAY [facility];  Service: Orthopedics    BUNIONECTOMY Right 5/1/2017    Procedure: BUNIONECTOMY - PROXIMAL HALLUX VALGUS CORRECTION W/BONE GRAFT;  Surgeon: Janes Benavidez M.D.;  Location: SURGERY [facility];  Service:     NEUROMA EXCISION  5/1/2017    Procedure: NEUROMA EXCISION - 2ND WEBSPACE;  Surgeon: Janes Benavidez M.D.;  Location: SURGERY [facility];  Service:     BREAST IMPLANT REVISION Bilateral 4/11/2016    Procedure: BREAST IMPLANT EXCHANGE OF SALINE TO SILICONE W/REPOSITIONING OF LATERAL FOLDS;  Surgeon: Mikey Adkins M.D.;  Location: SURGERY [facility];  Service:     BREAST BIOPSY  1/24/2012    Performed by SANDRA ESTRADA at SURGERY SAME DAY [facility]    SHOULDER ARTHROSCOPY  12/1/2010    Performed by KATE CHANCE at SURGERY [facility]    SHOULDER DECOMPRESSION  12/1/2010    Performed by KATE CHANCE at SURGERY [facility]    ROTATOR CUFF REPAIR  12/1/2010    Performed by KATE CHANCE at SURGERY [facility]    SHOULDER DECOMPRESSION ARTHROSCOPIC  5/13/2009    Performed by KATE CHANCE at SURGERY [facility]    ROTATOR CUFF REPAIR  5/13/2009    Performed by KATE CHANCE at SURGERY [facility]    HYSTERECTOMY, VAGINAL  1999    fibroids    SD BREAST AUGMENTATION WITH IMPLANT Bilateral 1999    OTHER ORTHOPEDIC SURGERY  1993    joint repair left thumb    US-NEEDLE CORE BX-BREAST PANEL         FAMILY HISTORY  Family History   Problem Relation Age of Onset    Cancer Paternal Aunt         breast    Diabetes Mother     Thyroid Mother     Diabetes Father     Thyroid Daughter     Thyroid Sister     Cancer Paternal Aunt         breast, uterine    Cancer Paternal Aunt         bladder    Cancer Paternal Aunt          colon    Heart Disease Neg Hx     Stroke Neg Hx     Alcohol/Drug Neg Hx        SOCIAL HISTORY   reports that Mariela Huff has never smoked. Mariela Huff has never used smokeless tobacco. Mariela Huff reports current alcohol use of about 0.6 oz per week. Mariela Huff reports that Mariela Huff does not use drugs.    CURRENT MEDICATIONS  Discharge Medication List as of 12/20/2022  7:01 AM        CONTINUE these medications which have NOT CHANGED    Details   HYDROcodone-acetaminophen (NORCO) 5-325 MG Tab per tablet Take 1-2 Tablets by mouth every 6 hours as needed (pain) for up to 7 days. Do not exceed 6 tabs in 24 hours, Disp-40 Tablet, R-0, Normal      ondansetron (ZOFRAN) 4 MG Tab tablet Take 1 Tablet by mouth every four hours as needed for Nausea/Vomiting., Disp-20 Tablet, R-0, Normal      levothyroxine (SYNTHROID) 75 MCG Tab TAKE 1 TABLET EVERY MORNING ON AN EMPTY STOMACH, Disp-90 Tablet, R-0, Normal      carbidopa-levodopa CR (SINEMET CR)  MG per tablet Take 1.5 Tablets by mouth 3 times a day AND 1 Tablet every evening., Disp-495 Tablet, R-2, Normal      carbidopa-levodopa (SINEMET)  MG Tab Take 1.5 Tablets by mouth every morning., Disp-135 Tablet, R-2, Normal      meloxicam (MOBIC) 7.5 MG Tab Take 1 Tablet by mouth every day., Disp-30 Tablet, R-1, Normal      trihexyphenidyl (ARTANE) 2 MG Tab TAKE 1 TABLET THREE TIMES A DAY, Disp-90 Tablet, R-11, Normal      famotidine (PEPCID) 20 MG Tab TAKE 1 TABLET BY MOUTH TWICE A DAY, Disp-120 tablet, R-0, Normal      Cyanocobalamin (B-12) 3000 MCG SL Tab Place 1 Tab under tongue., Historical Med      Ascorbic Acid (VITAMIN C) 500 MG Chew Tab Take 500 mg by mouth every day., Historical Med      Cholecalciferol (VITAMIN D) 50 MCG (2000 UT) Cap Take 1 Cap by mouth., Historical Med             ALLERGIES  Pcn [penicillins], Percocet [oxycodone-acetaminophen], and Symbicort [budesonide-formoterol fumarate]    PHYSICAL EXAM  Pulse  ox interpretation: I interpret this pulse ox as normal.  Constitutional: Alert in no apparent distress. Well appearing 62 year old female siting upright in bed. Right shoulder is in a sling.  HENT: No signs of trauma, Bilateral external ears normal, Nose normal.   Eyes: Pupils are equal, Conjunctiva normal, Non-icteric.   Neck: Normal range of motion, No tenderness, Supple, No stridor.   Cardiovascular: Regular rate and rhythm, no murmurs. Heart score is 1.  Thorax & Lungs: Normal breath sounds, No respiratory distress, No wheezing, No chest tenderness.   Abdomen: Soft, No tenderness, No masses, No pulsatile masses. No peritoneal signs.  Skin: Warm, Dry, No erythema, No rash.   Back: No bony tenderness, No CVA tenderness.   Extremities: Intact distal pulses, No edema, No tenderness, No cyanosis.  Musculoskeletal: Right shoulder/arm immobilized. No edema. Strong radial pulse. No sensory deficit.   Neurologic: Alert , Normal motor function, Normal sensory function, No focal deficits noted. Not actively having a tremor.  Psychiatric: Affect normal, Judgment normal, Mood normal.      VITAL SIGNS:   /83   Pulse 94   Temp 36.8 °C (98.3 °F) (Temporal)   Resp 18   Ht 1.524 m (5')   Wt 47.6 kg (105 lb)   SpO2 94%   BMI 20.51 kg/m²     DIAGNOSTIC STUDIES    Labs:   Reviewed pertinent labs discussed in MDM.    EKG:   Results for orders placed or performed during the hospital encounter of 22   EKG   Result Value Ref Range    Report       [facility] Emergency Dept.    Test Date:  2022  Pt Name:    ROBBY COATES             Department: ER  MRN:        [id number]                      Room:       RD 02 H  Gender:     Female                       Technician: [id number]  :        1960                   Requested By:JACQUES LISA II  Order #:    [id number]                    Reading MD: Jacques Lisa II, MD    Measurements  Intervals                                Axis  Rate:        84                           P:          63  IL:         141                          QRS:        2  QRSD:       67                           T:          54  QT:         355  QTc:        420    Interpretive Statements  Sinus rhythm  Probable left atrial enlargement  Normal intervals.  No ST elevation or depression. Normal twaves.  Impression: Normal sinus rhythm EKG.  Compared to ECG 04/29/2020 11:39:52  Right ventricular hypertrophy now present  RSR' in V1 or V2 now present  Possible ischemia no longer present   Electronically Signed On 12- 4:00:35 PST by Jacques Roca II, MD     12 Lead EKG interpreted by myself as detailed above.    COURSE & MEDICAL DECISION MAKING     Nursing notes, vital signs, PMSFSHx reviewed in chart     Prior records reviewed , Neurology notes, discussing Parkinson's disease    DISCUSSION OF MANAGEMENT WITH OTHER PHYSICIANS, Hospitals in [place] OR APPROPRIATE SOURCE  Sent message through [employer] to Neurologist.     INDEPENDENT INTERPRETATION OF STUDIES  See ekg interpretation above.     ESCALATION OF CARE  I considered acute inpatient care management, however at this time, the patient is most appropriate for outpatient management.    SOCIAL DETERMINANTS THAT SIGNIFICANTLY AFFECT CARE  none    PRESCRIPTION DRUG CONSIDERED  Select: Reviewed medications, and no changes will be made    DIAGNOSTICS TESTS CONSIDERED  PERC rule not applicable due to age, recent surgery and HEART Score 1 . Ddimer positive and CTA chest will be done.     ADDITIONAL PROBLEMS ADDRESSED - COPA  In addition to the chief complaint, I have addressed the following problems: Post operative pain, given tylenol for shoulder pain.     PLAN   1:43 AM - Per protocol, triage ordered for CBC w/diff, CMP, Lipase, and UA to evaluate. Will order Troponin, D-Dimer, Phosphorus, and EKG to evaluate Mariela Huff's complaints. 62 year old female with a history of Parkinson's 1 week status post right rotator cuff repair. Presenting  today with bilateral shaking episodes. On exam she has no neurologic abnormalities, not actively having a tremor. Causes could be rigor from fever, medication reaction, electrolyte abnormalities, anxiety, or  missing parkinson's med dosing. Considered this being a seizure but she has bilateral tremors and no loss of consciousness so this is not a seizure. She also mentioned abdominal pain in triage but she is denying abdominal pain now. She also mentions having a central chest ache. Will initiate a chest pain workup, including Troponin and D-Dimer. She is at a higher right for a pulmonary embolism because of her immobilized right upper extremity.    COURSE  2:52 AM - Ordered for DX-Chest to evaluate.    3:16 AM - D-dimer elevated. Ordered for CT-CTA Chest Pulmonary Artery w/ recons to evaluate.    3:22 AM - The patient will be medicated with Norco 5-325 mg PO 1 tablet for her pain.     3:36 AM - Per nursing, the patient is refusing Norco. The patient will instead be medicated with Tylenol 1000 mg PO for her pain.    4:00 AM - The patient's Troponin is negative. Heart score is 1.    4:43 AM - Per CT, the patient is shaking too much for accurate imaging. The patient will be medicated with Ativan 1 mg injection for her shaking/anxiety.    4:44 AM - Patient was reevaluated at bedside. Patient is experiencing shaking similar to a rhythmic myoclonic jerking when ranging the left upper extremities. Cogwheel sensation. Present in both upper and lower extremities, more prominent on the right than left. Gave 1 mg Ativan, will reevaluate and reconsider giving Carbidopa Levadopa if no improvement. Also considering hospitalization.    5:05 AM - Patient was reevaluated at bedside. Her shaking appears to have improved significantly since Ativan administration.    7:06 AM  CTA negative. Chest pain could be from muscle spasms at chest wall. No recurrence of myoclonic movements since ativan. Discussed our work up and findings.  Unclear exact etiology of her symptoms, she says these have not happened before. I would like her to call her Neurologist to discuss today's visit and follow up. I have sent a message to Dr. Cam on her behalf. I have also provided her with a very short course of valium to take as needed if symptoms recur.     In prescribing controlled substances to this patient, I certify that I have obtained and reviewed the medical history of Mariela Huff. I have also made a good leonard effort to obtain applicable records from other providers who have treated the patient and records did not demonstrate any increased risk of substance abuse that would prevent me from prescribing controlled substances.     I have conducted a physical exam and documented it. I have reviewed   Daria’s prescription history as maintained by the [place] Prescription Monitoring Program.     I have assessed the patient’s risk for abuse, dependency, and addiction.     Given the above, I believe the benefits of controlled substance therapy outweigh the risks. Prescribing  valium to take for myoclonic symptoms. Accordingly, I have discussed the risk and benefits, treatment plan, and alternative therapies with the patient. Discussed that these are controlled substances and can interact with other medication especially recently prescribed Norco (she is not taking because it is not agreeable with her, primarily treating pain with tylenol alone).        DISPOSITION   Discharge to self care, with     CONDITION AT DISPOSITION  stable     FINAL IMPRESSION   1. Tremor    2. Myoclonic jerking    3. Chest pain, unspecified type    4. Parkinson's disease (HCC)    5. Post-operative pain      1. Tremor    2. Myoclonic jerking    3. Chest pain, unspecified type    4. Parkinson's disease (HCC)    5. Post-operative pain            Kota MAKI (Grantibe), am scribing for, and in the presence of, JOHN Judd II.    Electronically signed by:  Kota Willingham (Scribe), 12/20/2022    IJacques II, M* personally performed the services described in this documentation, as scribed by Kota Willingham in my presence, and it is both accurate and complete.     The note accurately reflects work and decisions made by me.  Jacques Roca II, M.D.  12/20/2022  7:13 AM

## 2022-12-20 NOTE — ED NOTES
Patient from the lobby to RED 02H in WC accompanied by significant other.  Labs drawn and sent in triage.    Chart up for ERP.    Pending urine.

## 2023-02-06 NOTE — TELEPHONE ENCOUNTER
----- Message from Sami Prasad, Med Ass't sent at 11/12/2018 11:25 AM PST -----  Regarding: FW: Prescription Question  Contact: [phone]      ----- Message -----  From: Mariela Huff  Sent: 11/10/2018   2:26 PM  To: Neurology [facility]  Subject: Prescription Question                            After my last appointment I took the carbidopa/levodopa as prescribed.  In the first few days I thought I felt improvement but I’m not totally sure because I see subtle changes (good and bad) every day.  After being on it for three weeks, I weaned back off of it.  The longer I took it, my tremors seemed worse and I felt like I was walking on the deck of a boat - had trouble walking in a straight line a it felt as though things were tipped. After weaning off, I felt much better but of course still have the initial symptoms. I’m wondering if it was too strong for where I am in this disease process.  Where do we go from here?    Script escribed

## 2023-02-09 ENCOUNTER — APPOINTMENT (OUTPATIENT)
Dept: MEDICAL GROUP | Facility: PHYSICIAN GROUP | Age: 63
End: 2023-02-09
Payer: COMMERCIAL

## 2023-02-13 RX ORDER — LEVOTHYROXINE SODIUM 0.07 MG/1
TABLET ORAL
Qty: 90 TABLET | Refills: 0 | Status: SHIPPED | OUTPATIENT
Start: 2023-02-13 | End: 2023-05-15

## 2023-02-13 NOTE — TELEPHONE ENCOUNTER
Received request via: Pharmacy    Was the patient seen in the last year in this department? No    Does the patient have an active prescription (recently filled or refills available) for medication(s) requested? No    Does the patient have shelter Plus and need 100 day supply (blood pressure, diabetes and cholesterol meds only)? Patient does not have SCP

## 2023-02-14 RX ORDER — LEVOTHYROXINE SODIUM 0.07 MG/1
75 TABLET ORAL
Qty: 90 TABLET | Refills: 0 | OUTPATIENT
Start: 2023-02-14

## 2023-02-15 ENCOUNTER — APPOINTMENT (OUTPATIENT)
Dept: MEDICAL GROUP | Facility: PHYSICIAN GROUP | Age: 63
End: 2023-02-15
Payer: COMMERCIAL

## 2023-03-08 ENCOUNTER — TELEPHONE (OUTPATIENT)
Dept: NEUROLOGY | Facility: MEDICAL CENTER | Age: 63
End: 2023-03-08
Payer: COMMERCIAL

## 2023-03-09 ENCOUNTER — OFFICE VISIT (OUTPATIENT)
Dept: NEUROLOGY | Facility: MEDICAL CENTER | Age: 63
End: 2023-03-09
Attending: PSYCHIATRY & NEUROLOGY
Payer: COMMERCIAL

## 2023-03-09 VITALS
SYSTOLIC BLOOD PRESSURE: 100 MMHG | OXYGEN SATURATION: 96 % | HEART RATE: 85 BPM | HEIGHT: 60 IN | DIASTOLIC BLOOD PRESSURE: 62 MMHG | BODY MASS INDEX: 20.56 KG/M2 | WEIGHT: 104.72 LBS | TEMPERATURE: 98.4 F | RESPIRATION RATE: 16 BRPM

## 2023-03-09 DIAGNOSIS — G20.A1 PARKINSON'S DISEASE (HCC): ICD-10-CM

## 2023-03-09 DIAGNOSIS — G24.9 DYSTONIA OF FOOT: ICD-10-CM

## 2023-03-09 PROCEDURE — 99214 OFFICE O/P EST MOD 30 MIN: CPT | Performed by: PSYCHIATRY & NEUROLOGY

## 2023-03-09 PROCEDURE — 99211 OFF/OP EST MAY X REQ PHY/QHP: CPT | Performed by: PSYCHIATRY & NEUROLOGY

## 2023-03-09 ASSESSMENT — FIBROSIS 4 INDEX: FIB4 SCORE: 4.57

## 2023-03-09 ASSESSMENT — PATIENT HEALTH QUESTIONNAIRE - PHQ9: CLINICAL INTERPRETATION OF PHQ2 SCORE: 0

## 2023-03-09 NOTE — PATIENT INSTRUCTIONS
Change your carbidopa/levodopa    Take both the immediate release and the controlled release simultaneously     Carbidopa/levodopa immediate release 1/2 tab twice daily at 6am and 11am   Carbidopa/levodopa controlled release 1.5 tabs three times daily at 6am, 11am, and 4pm  Carbidopa/levodopa controlled release 1 tab at bedtime

## 2023-03-09 NOTE — PROGRESS NOTES
"Chief Complaint   Patient presents with    Follow-Up     Parkinson's disease       History of present illness:  Mariela Huff 62 y.o. adult with Parkinson's disease since 2017 with OFF toe curling dystonia s/p botox.       PD Meds:  Artane 2mg in the morning and 1mg twice daily   Sinemet CR 25/100mg 1.5 tablet QAM 8am, 1.5 tablets at 1pm, and 1 tab at 6 pm  Sinemet immediate release 0.5 tabs every morning at about 6pm.      She has had right sided cramping and artane has been helpful for treatment of this. Her main issue is curling of her toes and turning inwards of her ankles first thing in the morning. This tends to occur before her first dose of sinemet or in between doses of sinemet.   The immediate release sinemet caused involuntary movement and poor coordination of her left leg. The CR sinemet does not cause this problem as much. The CR dose takes about 45 minutes to 1 hour to kick in.        12/14/21:  She has had bilateral toe curling as an OFF symptom. Currently with every 5 hour dosing of Sinemet CR she is not experiencing this however does have early morning dystonia. She has been unable to tolerate immediate release Sinemet due to what sounds like lower extremity dyskinesia. I have prescribed 1 tablet of Sinemet immediate release to be given in the morning for her early morning OFF. This is in addition to her current regimen of Sinemet CR.  She is H&Y stage 1. A potential adjunctive treatment is a dopamine agonist if fluctuations worsen. However currently with her current schedule of Sinemet CR this is not required.     6/15/22: She has had frequent headaches since the last visit. The headaches are located in the back of the head. She denies history of headache. She is able to function with her headache. The headaches were occurring in March/April but the last few weeks are improved.   Her gait will \"tighten up\" and she has trouble walking smoothly occasionally. This may resolve on its own after " resting but occasionally persists for hours. She also occasionally notices involuntary movements that do not impair her activities.      11/2/22: She continues to experience the dystonia in her feet in the morning. After her first dose in the AM, the dystonia of the foot resolved. It takes 45 minutes to 1 hour for that first dose to kick in.   Patient is providing a history of off dystonia that is bothersome, causing her foot to turned inwards and cramp.  It is worse in the morning and at the end of her afternoon doses.  I have counseled her to increase the dose of the first morning dose of immediate release carbidopa levodopa so that it kicks in faster.  She will increase to 1-1/2 tablets once daily.  The patient will also increase the frequency of the carbidopa levodopa CR to 4 times daily, with 1 is 1/2 tablets 3 times daily and 1 tablet in the evening.  Will wean off of trihexyphenidyl, given that she is having dry mouth and possible brain fog as side effects.  I have counseled her on the potential of the dystonia to worsen after discontinuation of Artane.     3/9/23: She resumed trihexypehnidyl 2mg in the morning and 1mg twice daily for return of foot cramping, and discomfort at night. She has morning stiffness at 11-12pm. Her ankles curve in and she has tightness in her hips.      Movement-Specific ROS  Sleep: No problems    Constipation: Has a daily bowel movement    Urination: No   Dizziness: Has had vertigo intermittently in the last few months   Hallucinations: No    Cognition: Occasional word finding difficulty   Excessive daytime somnolence: She occasionally naps in the afternoon after her second dose of levodopa   Balance: No problems   Exercise: Does rock steady boxing 3 times per week and yoga several times weekly.      Past medical history:   Past Medical History:   Diagnosis Date    Anesthesia     severe ponv, hard to wake up    Arthritis     shoulders    Chest tightness     Cough     Diverticulosis  2012    pt stated it was noted on colonoscopy    Hypothyroid     hypothyroid    Infectious disease     around bronchitis/flu 12/2011    Lyme disease     chronic    Pain 04/2018    right foot, bilateral shoulder    Painful breathing     Shortness of breath        Past surgical history:   Past Surgical History:   Procedure Laterality Date    PB SHLDR ARTHROSCOP,SURG,W/ROTAT CUFF REPB Right 12/14/2022    Procedure: RIGHT SHOULDER ARTHROSCOPY ROTATOR CUFF REPAIR;  Surgeon: Mildred Hu M.D.;  Location: [facility];  Service: Orthopedics    PB REPAIR BICEPS LONG TENDON Right 12/14/2022    Procedure: RIGHT BICEPS TENODESIS;  Surgeon: Mildred Hu M.D.;  Location: [facility];  Service: Orthopedics    NJ SHLDR ARTHROSCOP,PART ACROMIOPLAS Right 12/14/2022    Procedure: RIGHT SUBACROMIAL DECOMPRESSION, LABRAL DEBRIDEMENT, REPAIRS AS INDICATED;  Surgeon: Mildred Hu M.D.;  Location: [facility];  Service: Orthopedics    TOE FUSION Right 5/7/2018    Procedure: TOE FUSION - 1ST MTP;  Surgeon: Janes Benavidez M.D.;  Location: SURGERY SAME DAY [facility];  Service: Orthopedics    REPAIR, TENDON, LOWER EXTREMITY, USING TENDON GRAFT Right 5/7/2018    Procedure: FLEXOR TENDON REPAIR - 4TH RELEASE W/SOFT TISSUE RELEASE 2/3;  Surgeon: Janes Benavidez M.D.;  Location: SURGERY SAME DAY [facility];  Service: Orthopedics    HARDWARE REMOVAL ORTHO Right 5/7/2018    Procedure: HARDWARE REMOVAL ORTHO - INTERNAL FIXATION;  Surgeon: Janes Benavidez M.D.;  Location: SURGERY SAME DAY [facility];  Service: Orthopedics    ORTHOPEDIC OSTEOTOMY Right 5/7/2018    Procedure: ORTHOPEDIC OSTEOTOMY - DISTAL METATARSAL 2/3/4;  Surgeon: Janes Benavidez M.D.;  Location: SURGERY SAME DAY [facility];  Service: Orthopedics    BUNIONECTOMY Right 5/1/2017    Procedure: BUNIONECTOMY - PROXIMAL HALLUX VALGUS CORRECTION W/BONE GRAFT;  Surgeon: Janes Benavidez M.D.;   Location: SURGERY [facility];  Service:     NEUROMA EXCISION  5/1/2017    Procedure: NEUROMA EXCISION - 2ND WEBSPACE;  Surgeon: Janes Benavidez M.D.;  Location: SURGERY [facility];  Service:     BREAST IMPLANT REVISION Bilateral 4/11/2016    Procedure: BREAST IMPLANT EXCHANGE OF SALINE TO SILICONE W/REPOSITIONING OF LATERAL FOLDS;  Surgeon: Mikey Adkins M.D.;  Location: SURGERY [facility];  Service:     BREAST BIOPSY  1/24/2012    Performed by SANDRA ESTRADA at SURGERY SAME DAY [facility]    SHOULDER ARTHROSCOPY  12/1/2010    Performed by KATE CHANCE at SURGERY [facility]    SHOULDER DECOMPRESSION  12/1/2010    Performed by KATE CHANCE at SURGERY [facility]    ROTATOR CUFF REPAIR  12/1/2010    Performed by KATE CHANCE at SURGERY [facility]    SHOULDER DECOMPRESSION ARTHROSCOPIC  5/13/2009    Performed by KATE CHANCE at SURGERY [facility]    ROTATOR CUFF REPAIR  5/13/2009    Performed by KATE CHANCE at SURGERY [facility]    HYSTERECTOMY, VAGINAL  1999    fibroids    MN BREAST AUGMENTATION WITH IMPLANT Bilateral 1999    OTHER ORTHOPEDIC SURGERY  1993    joint repair left thumb    US-NEEDLE CORE BX-BREAST PANEL         Family history:   Family History   Problem Relation Age of Onset    Cancer Paternal Aunt         breast    Diabetes Mother     Thyroid Mother     Diabetes Father     Thyroid Daughter     Thyroid Sister     Cancer Paternal Aunt         breast, uterine    Cancer Paternal Aunt         bladder    Cancer Paternal Aunt         colon    Heart Disease Neg Hx     Stroke Neg Hx     Alcohol/Drug Neg Hx        Social history:   Social History     Socioeconomic History    Marital status:      Spouse name: Not on file    Number of children: Not on file    Years of education: Not on file    Highest education level: Bachelor's degree (e.g., BA, AB, BS)   Occupational History    Not on file   Tobacco Use    Smoking status: Never    Smokeless  tobacco: Never   Vaping Use    Vaping Use: Never used   Substance and Sexual Activity    Alcohol use: Yes     Alcohol/week: 0.6 oz     Types: 1 Glasses of wine per week     Comment: Occasionally    Drug use: No    Sexual activity: Yes     Partners: Male   Other Topics Concern    Not on file   Social History Narrative    Not on file     Social Determinants of Health     Financial Resource Strain: Not on file   Food Insecurity: Not on file   Transportation Needs: Not on file   Physical Activity: Not on file   Stress: Not on file   Social Connections: Not on file   Intimate Partner Violence: Not on file   Housing Stability: Not on file       Current medications:   Current Outpatient Medications   Medication    carbidopa-levodopa (SINEMET)  MG Tab    levothyroxine (SYNTHROID) 75 MCG Tab    ondansetron (ZOFRAN) 4 MG Tab tablet    carbidopa-levodopa CR (SINEMET CR)  MG per tablet    trihexyphenidyl (ARTANE) 2 MG Tab    famotidine (PEPCID) 20 MG Tab    Cyanocobalamin (B-12) 3000 MCG SL Tab    Ascorbic Acid (VITAMIN C) 500 MG Chew Tab    Cholecalciferol (VITAMIN D) 50 MCG (2000 UT) Cap    meloxicam (MOBIC) 7.5 MG Tab     No current facility-administered medications for this visit.       Medication Allergy:  Allergies   Allergen Reactions    Pcn [Penicillins] Rash     Rash      Percocet [Oxycodone-Acetaminophen] Vomiting    Symbicort [Budesonide-Formoterol Fumarate]      Laryngitis        Physical examination:   Vitals:    03/09/23 1115 03/09/23 1119   BP: 116/70 100/62   BP Location: Left arm Left arm   Patient Position: Sitting Standing   BP Cuff Size: Adult Adult   Pulse: 78 85   Resp: 16    Temp: 36.9 °C (98.4 °F)    TempSrc: Temporal    SpO2: 97% 96%   Weight: 47.5 kg (104 lb 11.5 oz)    Height: 1.524 m (5')      Neurological Exam     Normal facial expression and normal spontaneous movement.  Mild right arm rest tremor   No rigidity    Gait       There is mild inward turning of the right foot and flexion of  the toes when she ambulates.   There is no bradykinesia or freezing of gait.  Her speed of movement is normal.      Labs:  I reviewed the following labs personally:  None    Imaging:   None     ASSESSMENT AND PLAN:  Problem List Items Addressed This Visit       Parkinson's disease (HCC)    Relevant Medications    carbidopa-levodopa (SINEMET)  MG Tab    Dystonia of foot       1. Parkinson's disease (HCC)  - carbidopa-levodopa (SINEMET)  MG Tab; Take 0.5 Tablets by mouth 2 (two) times a day.  Dispense: 90 Tablet; Refill: 2    2. Dystonia of foot  Due to wearing OFF at 11-12pm with leg stiffness, I have instructed her to:     Change your carbidopa/levodopa    Take both the immediate release and the controlled release simultaneously     Carbidopa/levodopa immediate release 1/2 tab twice daily at 6am and 11am   Carbidopa/levodopa controlled release 1.5 tabs three times daily at 6am, 11am, and 4pm  Carbidopa/levodopa controlled release 1 tab at bedtime     She will continue with rock steady boxing.     FOLLOW-UP:   Return in about 6 months (around 9/9/2023).     Dr. Andrez Cam D.O.  [facility]

## 2023-03-27 ENCOUNTER — OFFICE VISIT (OUTPATIENT)
Dept: MEDICAL GROUP | Facility: MEDICAL CENTER | Age: 63
End: 2023-03-27
Payer: COMMERCIAL

## 2023-03-27 VITALS
DIASTOLIC BLOOD PRESSURE: 66 MMHG | OXYGEN SATURATION: 98 % | WEIGHT: 105.6 LBS | HEIGHT: 60 IN | SYSTOLIC BLOOD PRESSURE: 104 MMHG | TEMPERATURE: 98.7 F | HEART RATE: 86 BPM | BODY MASS INDEX: 20.73 KG/M2

## 2023-03-27 DIAGNOSIS — R05.1 ACUTE COUGH: ICD-10-CM

## 2023-03-27 DIAGNOSIS — J02.9 PHARYNGITIS, UNSPECIFIED ETIOLOGY: ICD-10-CM

## 2023-03-27 LAB
INT CON NEG: NEGATIVE
INT CON POS: POSITIVE
S PYO AG THROAT QL: NEGATIVE

## 2023-03-27 PROCEDURE — 99213 OFFICE O/P EST LOW 20 MIN: CPT | Performed by: STUDENT IN AN ORGANIZED HEALTH CARE EDUCATION/TRAINING PROGRAM

## 2023-03-27 PROCEDURE — 87880 STREP A ASSAY W/OPTIC: CPT | Performed by: STUDENT IN AN ORGANIZED HEALTH CARE EDUCATION/TRAINING PROGRAM

## 2023-03-27 ASSESSMENT — FIBROSIS 4 INDEX: FIB4 SCORE: 4.57

## 2023-03-27 NOTE — PROGRESS NOTES
Chief Complaint   Patient presents with    Pharyngitis     X4d     Nasal Congestion     X4d     CC: sore throat/ white spots on tonsils   HPI:   This is a 62 y.o. patient has 3 days of sore throat, cough and congestion. reports runny nose. Mild left  ear fullness. No abnormal shortness of breath. No nausea vomiting or diarrhea. Mild subjective fever and chills. She was recently with her grand kids who are also sick. No coughing up blood. No headache.  Patient has taken over-the-counter analgesics and decongestant with relief. ( Alkaseltzer)     ROS:  See hpi  Denies rash     I reviewed the patient's medications, allergies and medical history:  Current Outpatient Medications   Medication Sig Dispense Refill    carbidopa-levodopa (SINEMET)  MG Tab Take 0.5 Tablets by mouth 2 (two) times a day. 90 Tablet 2    levothyroxine (SYNTHROID) 75 MCG Tab TAKE 1 TABLET EVERY MORNING ON AN EMPTY STOMACH 90 Tablet 0    meloxicam (MOBIC) 7.5 MG Tab TAKE 1 TABLET BY MOUTH EVERY DAY (Patient not taking: Reported on 3/9/2023) 30 Tablet 1    ondansetron (ZOFRAN) 4 MG Tab tablet Take 1 Tablet by mouth every four hours as needed for Nausea/Vomiting. 20 Tablet 0    carbidopa-levodopa CR (SINEMET CR)  MG per tablet Take 1.5 Tablets by mouth 3 times a day AND 1 Tablet every evening. 495 Tablet 2    trihexyphenidyl (ARTANE) 2 MG Tab TAKE 1 TABLET THREE TIMES A DAY 90 Tablet 11    famotidine (PEPCID) 20 MG Tab TAKE 1 TABLET BY MOUTH TWICE A  tablet 0    Cyanocobalamin (B-12) 3000 MCG SL Tab Place 1 Tab under tongue.      Ascorbic Acid (VITAMIN C) 500 MG Chew Tab Take 500 mg by mouth every day.      Cholecalciferol (VITAMIN D) 50 MCG (2000 UT) Cap Take 1 Cap by mouth.       No current facility-administered medications for this visit.     Pcn [penicillins], Percocet [oxycodone-acetaminophen], and Symbicort [budesonide-formoterol fumarate]  Past Medical History:   Diagnosis Date    Anesthesia     severe ponv, hard to wake up     Arthritis     shoulders    Chest tightness     Cough     Diverticulosis 2012    pt stated it was noted on colonoscopy    Hypothyroid     hypothyroid    Infectious disease     around bronchitis/flu 12/2011    Lyme disease     chronic    Pain 04/2018    right foot, bilateral shoulder    Painful breathing     Shortness of breath         EXAM:  /66 (BP Location: Left arm, Patient Position: Sitting, BP Cuff Size: Adult)   Pulse 86   Temp 37.1 °C (98.7 °F) (Temporal)   Ht 1.524 m (5')   Wt 47.9 kg (105 lb 9.6 oz)   SpO2 98%   General: NAD, non-toxic appearance.  Eyes: PERRL, conjunctiva slightly injected, no photophobia or eye discharge.  Ears: Normal pinnae. TM's pearly gray with good landmarks bilaterally.  Sinuses: Non-tender over maxillary and frontal sinuses.  Throat: small tonsil, not significantly erythematous  Neck: Supple, without LAD  Lungs: Good air entry bilaterally, clear to auscultation. No wheeze, rhonchi or crackles. Normal respiratory effort.  Heart: Regular rate without murmur.  Abdomen: Soft and non-tender. No hepatosplenomegaly.  Skin: Warm and dry. No rash.           ASSESSMENT:   1. Acute cough  2. Pharyngitis, unspecified etiology  Acute  - presents for evaluation of pharyngitis with concern for exudative tonsils seen on self exam per patient.    - Centor 0/5  - POCT Rapid Strep A - negative    PLAN:  1. Discussed benign nature of viral upper respiratory infections.  2. OTC anti-pyretics and decongestants as needed. Supportive care advised.  3. Follow-up in office or urgent care for worsening symptoms, difficulty breathing, lack of expected recovery, or should new symptoms or problems arise.

## 2023-03-29 ENCOUNTER — OFFICE VISIT (OUTPATIENT)
Dept: URGENT CARE | Facility: CLINIC | Age: 63
End: 2023-03-29
Payer: COMMERCIAL

## 2023-03-29 VITALS
SYSTOLIC BLOOD PRESSURE: 100 MMHG | RESPIRATION RATE: 16 BRPM | OXYGEN SATURATION: 97 % | TEMPERATURE: 98.8 F | HEIGHT: 60 IN | HEART RATE: 113 BPM | WEIGHT: 105 LBS | BODY MASS INDEX: 20.62 KG/M2 | DIASTOLIC BLOOD PRESSURE: 66 MMHG

## 2023-03-29 DIAGNOSIS — J02.9 SORE THROAT: ICD-10-CM

## 2023-03-29 DIAGNOSIS — J01.00 ACUTE NON-RECURRENT MAXILLARY SINUSITIS: ICD-10-CM

## 2023-03-29 LAB — S PYO DNA SPEC NAA+PROBE: NOT DETECTED

## 2023-03-29 PROCEDURE — 99213 OFFICE O/P EST LOW 20 MIN: CPT | Performed by: PHYSICIAN ASSISTANT

## 2023-03-29 PROCEDURE — 87651 STREP A DNA AMP PROBE: CPT | Performed by: PHYSICIAN ASSISTANT

## 2023-03-29 RX ORDER — AZITHROMYCIN 500 MG/1
500 TABLET, FILM COATED ORAL DAILY
Qty: 5 TABLET | Refills: 0 | Status: SHIPPED | OUTPATIENT
Start: 2023-03-29 | End: 2023-05-31

## 2023-03-29 ASSESSMENT — ENCOUNTER SYMPTOMS
VOMITING: 0
FEVER: 0
DIARRHEA: 0
TROUBLE SWALLOWING: 1
CHILLS: 0
MYALGIAS: 0
SORE THROAT: 1
WHEEZING: 0
SINUS PAIN: 1
HEADACHES: 1
SPUTUM PRODUCTION: 0
DIZZINESS: 0
SHORTNESS OF BREATH: 0
CARDIOVASCULAR NEGATIVE: 1
ABDOMINAL PAIN: 0
SWOLLEN GLANDS: 1
NAUSEA: 0
COUGH: 1

## 2023-03-29 ASSESSMENT — FIBROSIS 4 INDEX: FIB4 SCORE: 4.57

## 2023-03-29 NOTE — PROGRESS NOTES
Subjective     Mariela Huff is a very pleasant 62 y.o. adult who presents with Sore Throat (X6 days)            Patient was seen by PCP last week and diagnosed with a viral URI.  She did not believe the strep test was and adequate sample.  She continues to have severe worsening symptoms including nasal congestion, discharge and sore throat.    Pharyngitis   This is a new problem. The current episode started in the past 7 days. The problem has been gradually worsening. There has been no fever. The fever has been present for Less than 1 day. Associated symptoms include congestion, coughing, headaches, swollen glands and trouble swallowing. Pertinent negatives include no abdominal pain, diarrhea, ear pain, shortness of breath or vomiting. Treatments tried: Sudafed. The treatment provided mild relief.       PMH:  has a past medical history of Anesthesia, Arthritis, Chest tightness, Cough, Diverticulosis (2012), Hypothyroid, Infectious disease, Lyme disease, Pain (04/2018), Painful breathing, and Shortness of breath.    Mariela Huff has no past medical history of Chest pain, Difficulty breathing, Fainting, Palpitations, Sputum production, Swelling of lower extremity, or Wheezing.  MEDS:   Current Outpatient Medications:     carbidopa-levodopa (SINEMET)  MG Tab, Take 0.5 Tablets by mouth 2 (two) times a day., Disp: 90 Tablet, Rfl: 2    levothyroxine (SYNTHROID) 75 MCG Tab, TAKE 1 TABLET EVERY MORNING ON AN EMPTY STOMACH, Disp: 90 Tablet, Rfl: 0    meloxicam (MOBIC) 7.5 MG Tab, TAKE 1 TABLET BY MOUTH EVERY DAY, Disp: 30 Tablet, Rfl: 1    ondansetron (ZOFRAN) 4 MG Tab tablet, Take 1 Tablet by mouth every four hours as needed for Nausea/Vomiting., Disp: 20 Tablet, Rfl: 0    carbidopa-levodopa CR (SINEMET CR)  MG per tablet, Take 1.5 Tablets by mouth 3 times a day AND 1 Tablet every evening., Disp: 495 Tablet, Rfl: 2    trihexyphenidyl (ARTANE) 2 MG Tab, TAKE 1 TABLET THREE TIMES A DAY, Disp: 90 Tablet,  Rfl: 11    famotidine (PEPCID) 20 MG Tab, TAKE 1 TABLET BY MOUTH TWICE A DAY, Disp: 120 tablet, Rfl: 0    Cyanocobalamin (B-12) 3000 MCG SL Tab, Place 1 Tab under tongue., Disp: , Rfl:     Ascorbic Acid (VITAMIN C) 500 MG Chew Tab, Take 500 mg by mouth every day., Disp: , Rfl:     Cholecalciferol (VITAMIN D) 50 MCG (2000 UT) Cap, Take 1 Cap by mouth., Disp: , Rfl:   ALLERGIES:   Allergies   Allergen Reactions    Pcn [Penicillins] Rash     Rash      Percocet [Oxycodone-Acetaminophen] Vomiting    Symbicort [Budesonide-Formoterol Fumarate]      Laryngitis      SURGHX:   Past Surgical History:   Procedure Laterality Date    PB SHLDR ARTHROSCOP,SURG,W/ROTAT CUFF REPB Right 12/14/2022    Procedure: RIGHT SHOULDER ARTHROSCOPY ROTATOR CUFF REPAIR;  Surgeon: Mildred Hu M.D.;  Location: [facility];  Service: Orthopedics    PB REPAIR BICEPS LONG TENDON Right 12/14/2022    Procedure: RIGHT BICEPS TENODESIS;  Surgeon: Mildred Hu M.D.;  Location: [facility];  Service: Orthopedics    MI SHLDR ARTHROSCOP,PART ACROMIOPLAS Right 12/14/2022    Procedure: RIGHT SUBACROMIAL DECOMPRESSION, LABRAL DEBRIDEMENT, REPAIRS AS INDICATED;  Surgeon: Mildred Hu M.D.;  Location: [facility];  Service: Orthopedics    TOE FUSION Right 5/7/2018    Procedure: TOE FUSION - 1ST MTP;  Surgeon: Janes Benavidez M.D.;  Location: SURGERY SAME DAY [facility];  Service: Orthopedics    REPAIR, TENDON, LOWER EXTREMITY, USING TENDON GRAFT Right 5/7/2018    Procedure: FLEXOR TENDON REPAIR - 4TH RELEASE W/SOFT TISSUE RELEASE 2/3;  Surgeon: Janes Benavidez M.D.;  Location: SURGERY SAME DAY [facility];  Service: Orthopedics    HARDWARE REMOVAL ORTHO Right 5/7/2018    Procedure: HARDWARE REMOVAL ORTHO - INTERNAL FIXATION;  Surgeon: Janes Benavidez M.D.;  Location: SURGERY SAME DAY [facility];  Service: Orthopedics    ORTHOPEDIC OSTEOTOMY Right 5/7/2018    Procedure:  ORTHOPEDIC OSTEOTOMY - DISTAL METATARSAL 2/3/4;  Surgeon: Janes Benavidez M.D.;  Location: SURGERY SAME DAY [facility];  Service: Orthopedics    BUNIONECTOMY Right 5/1/2017    Procedure: BUNIONECTOMY - PROXIMAL HALLUX VALGUS CORRECTION W/BONE GRAFT;  Surgeon: Janes Benavidez M.D.;  Location: SURGERY [facility];  Service:     NEUROMA EXCISION  5/1/2017    Procedure: NEUROMA EXCISION - 2ND WEBSPACE;  Surgeon: Janes Benavidez M.D.;  Location: SURGERY [facility];  Service:     BREAST IMPLANT REVISION Bilateral 4/11/2016    Procedure: BREAST IMPLANT EXCHANGE OF SALINE TO SILICONE W/REPOSITIONING OF LATERAL FOLDS;  Surgeon: Mikey Adkins M.D.;  Location: SURGERY [facility];  Service:     BREAST BIOPSY  1/24/2012    Performed by SANDRA ESTRADA at SURGERY SAME DAY [facility]    SHOULDER ARTHROSCOPY  12/1/2010    Performed by KATE CHANCE at SURGERY [facility]    SHOULDER DECOMPRESSION  12/1/2010    Performed by KATE CHANCE at SURGERY [facility]    ROTATOR CUFF REPAIR  12/1/2010    Performed by KATE CHANCE at SURGERY [facility]    SHOULDER DECOMPRESSION ARTHROSCOPIC  5/13/2009    Performed by KATE CHANCE at SURGERY [facility]    ROTATOR CUFF REPAIR  5/13/2009    Performed by KATE CHANCE at SURGERY [facility]    HYSTERECTOMY, VAGINAL  1999    fibroids    OK BREAST AUGMENTATION WITH IMPLANT Bilateral 1999    OTHER ORTHOPEDIC SURGERY  1993    joint repair left thumb    US-NEEDLE CORE BX-BREAST PANEL       SOCHX:  reports that Mariela Huff has never smoked. Mariela Huff has never used smokeless tobacco. Mariela Huff reports current alcohol use of about 0.6 oz per week. Mariela Huff reports that Mariela Huff does not use drugs.  FH: family history includes Cancer in Mariela Huff's paternal aunt, paternal aunt, paternal aunt, and paternal aunt; Diabetes in Mariela Huff's father and mother; Thyroid in Mariela Huff's daughter,  mother, and sister.    Review of Systems   Constitutional:  Negative for chills and fever.   HENT:  Positive for congestion, sinus pain, sore throat and trouble swallowing. Negative for ear pain.    Respiratory:  Positive for cough. Negative for sputum production, shortness of breath and wheezing.    Cardiovascular: Negative.    Gastrointestinal:  Negative for abdominal pain, diarrhea, nausea and vomiting.   Musculoskeletal:  Negative for myalgias.   Neurological:  Positive for headaches. Negative for dizziness.        Medications, Allergies, and current problem list reviewed today in Epic      Objective     /66 (BP Location: Left arm, Patient Position: Sitting, BP Cuff Size: Adult)   Pulse (!) 113   Temp 37.1 °C (98.8 °F) (Temporal)   Resp 16   Ht 1.524 m (5')   Wt 47.6 kg (105 lb)   SpO2 97%   BMI 20.51 kg/m²      Physical Exam  Vitals and nursing note reviewed.   Constitutional:       General: Mariela Huff is not in acute distress.     Appearance: Normal appearance. Mariela Huff is well-developed. Mariela Huff is not ill-appearing, toxic-appearing or diaphoretic.   HENT:      Head: Normocephalic and atraumatic.      Right Ear: Tympanic membrane, ear canal and external ear normal.      Left Ear: Tympanic membrane, ear canal and external ear normal.      Nose: Mucosal edema, congestion and rhinorrhea present. Rhinorrhea is purulent.      Comments: PND noted     Mouth/Throat:      Mouth: Mucous membranes are moist.      Pharynx: Posterior oropharyngeal erythema present. No oropharyngeal exudate.   Eyes:      General:         Right eye: No discharge.         Left eye: No discharge.      Conjunctiva/sclera: Conjunctivae normal.   Cardiovascular:      Rate and Rhythm: Regular rhythm. Tachycardia present.      Pulses: Normal pulses.      Heart sounds: Normal heart sounds.   Pulmonary:      Effort: Pulmonary effort is normal. No respiratory distress.      Breath sounds: Normal breath sounds. No  wheezing, rhonchi or rales.   Musculoskeletal:         General: No swelling or tenderness. Normal range of motion.      Cervical back: Normal range of motion and neck supple.      Right lower leg: No edema.      Left lower leg: No edema.   Lymphadenopathy:      Cervical: Cervical adenopathy present.   Skin:     General: Skin is warm and dry.   Neurological:      General: No focal deficit present.      Mental Status: Mariela Huff is alert and oriented to person, place, and time.   Psychiatric:         Mood and Affect: Mood normal.         Behavior: Behavior normal.         Thought Content: Thought content normal.         Judgment: Judgment normal.                           Assessment & Plan     This is a very pleasant 62-year-old patient presenting with 1 week of worsening URI symptoms including sore throat, nasal congestion, headache and fatigue.  Seen previously by PCP and diagnosed with viral URI.  She has been completing over-the-counter recommendations and she reports no improvement in her symptoms.  She denies fever chills or body aches.  She denies shortness of breath, wheezing, chest pain, vomiting or diarrhea.  She is COVID vaccinated with negative at home COVID test.  Elevated pulse otherwise vitals are normal.  Exam shows nasal congestion with purulent drainage, postnasal drip and pharynx erythema.  Lungs are clear bilaterally without wheezing rhonchi or rales.  In clinic strep testing negative.  Symptoms consistent with likely viral URI versus early bacterial maxillary sinusitis.    Patient was given a contingent antibiotic prescription to fill and use as directed if symptoms progressed as discussed and agreed upon.      1. Sore throat  POCT GROUP A STREP, PCR      2. Acute non-recurrent maxillary sinusitis  azithromycin (ZITHROMAX) 500 MG tablet          Take full course of antibiotic  Tylenol and Motrin for fever and pain  OTC meds as discussed including oral decongestant if tolerated  Increase  fluids and rest  Nasal spray, nasal rinse/wash, calvin pot      Return to clinic or go to ED if symptoms worsen or persist. Red flag symptoms and indications for ED discussed at length. Patient/Parent/Guardian voices understanding. Follow-up with your primary care provider in 3-5 days. All side effects and potential interactions of prescribed medication discussed including allergic response, GI upset, tendon injury, rash, sedation etc.    Please note that this dictation was created using voice recognition software. I have made every reasonable attempt to correct obvious errors, but I expect that there are errors of grammar and possibly content that I did not discover before finalizing the note.

## 2023-05-14 DIAGNOSIS — G20.A1 PARKINSON'S DISEASE (HCC): ICD-10-CM

## 2023-05-15 RX ORDER — TRIHEXYPHENIDYL HYDROCHLORIDE 2 MG/1
TABLET ORAL
Qty: 90 TABLET | Refills: 11 | OUTPATIENT
Start: 2023-05-15

## 2023-05-15 NOTE — TELEPHONE ENCOUNTER
Received request via: Pharmacy    Was the patient seen in the last year in this department? Yes    Does the patient have an active prescription (recently filled or refills available) for medication(s) requested? Yes.     Does the patient have [facility] and need 100 day supply (blood pressure, diabetes and cholesterol meds only)? Medication is not for cholesterol, blood pressure or diabetes

## 2023-05-18 RX ORDER — TRIHEXYPHENIDYL HYDROCHLORIDE 2 MG/1
2 TABLET ORAL 3 TIMES DAILY
Qty: 270 TABLET | Refills: 2 | Status: SHIPPED | OUTPATIENT
Start: 2023-05-18

## 2023-05-30 ENCOUNTER — HOSPITAL ENCOUNTER (OUTPATIENT)
Dept: LAB | Facility: MEDICAL CENTER | Age: 63
End: 2023-05-30
Attending: FAMILY MEDICINE
Payer: COMMERCIAL

## 2023-05-30 DIAGNOSIS — E03.9 HYPOTHYROIDISM, UNSPECIFIED TYPE: ICD-10-CM

## 2023-05-30 LAB
T4 FREE SERPL-MCNC: 1.98 NG/DL (ref 0.93–1.7)
TSH SERPL DL<=0.005 MIU/L-ACNC: 0.21 UIU/ML (ref 0.38–5.33)

## 2023-05-30 PROCEDURE — 84443 ASSAY THYROID STIM HORMONE: CPT

## 2023-05-30 PROCEDURE — 84439 ASSAY OF FREE THYROXINE: CPT

## 2023-05-30 PROCEDURE — 36415 COLL VENOUS BLD VENIPUNCTURE: CPT

## 2023-05-31 ENCOUNTER — OFFICE VISIT (OUTPATIENT)
Dept: MEDICAL GROUP | Facility: PHYSICIAN GROUP | Age: 63
End: 2023-05-31
Payer: COMMERCIAL

## 2023-05-31 VITALS
SYSTOLIC BLOOD PRESSURE: 106 MMHG | WEIGHT: 105 LBS | BODY MASS INDEX: 20.62 KG/M2 | OXYGEN SATURATION: 97 % | HEIGHT: 60 IN | TEMPERATURE: 98.4 F | HEART RATE: 88 BPM | DIASTOLIC BLOOD PRESSURE: 64 MMHG

## 2023-05-31 DIAGNOSIS — Z12.11 COLON CANCER SCREENING: ICD-10-CM

## 2023-05-31 DIAGNOSIS — E03.9 HYPOTHYROIDISM, UNSPECIFIED TYPE: ICD-10-CM

## 2023-05-31 DIAGNOSIS — M25.50 ARTHRALGIA, UNSPECIFIED JOINT: ICD-10-CM

## 2023-05-31 DIAGNOSIS — N64.59 BREAST SYMPTOM: Primary | ICD-10-CM

## 2023-05-31 PROBLEM — A37.90 PERTUSSIS: Status: RESOLVED | Noted: 2020-05-19 | Resolved: 2023-05-31

## 2023-05-31 PROBLEM — R11.2 PONV (POSTOPERATIVE NAUSEA AND VOMITING): Status: RESOLVED | Noted: 2022-12-14 | Resolved: 2023-05-31

## 2023-05-31 PROBLEM — M79.10 MYALGIA: Status: ACTIVE | Noted: 2023-05-31

## 2023-05-31 PROBLEM — R07.89 CHEST TIGHTNESS: Status: RESOLVED | Noted: 2020-04-06 | Resolved: 2023-05-31

## 2023-05-31 PROBLEM — R06.00 DYSPNEA: Status: RESOLVED | Noted: 2020-04-25 | Resolved: 2023-05-31

## 2023-05-31 PROBLEM — M79.10 MYALGIA: Status: RESOLVED | Noted: 2023-05-31 | Resolved: 2023-05-31

## 2023-05-31 PROBLEM — Z98.890 PONV (POSTOPERATIVE NAUSEA AND VOMITING): Status: RESOLVED | Noted: 2022-12-14 | Resolved: 2023-05-31

## 2023-05-31 PROCEDURE — 3078F DIAST BP <80 MM HG: CPT | Performed by: FAMILY MEDICINE

## 2023-05-31 PROCEDURE — 3074F SYST BP LT 130 MM HG: CPT | Performed by: FAMILY MEDICINE

## 2023-05-31 PROCEDURE — 99214 OFFICE O/P EST MOD 30 MIN: CPT | Performed by: FAMILY MEDICINE

## 2023-05-31 ASSESSMENT — FIBROSIS 4 INDEX: FIB4 SCORE: 4.57

## 2023-05-31 ASSESSMENT — ENCOUNTER SYMPTOMS
CHILLS: 0
SHORTNESS OF BREATH: 0
FEVER: 0

## 2023-05-31 NOTE — PROGRESS NOTES
Subjective:     CC: arthralgia, breast symptom    HPI:   Mariela presents today with    Problem   Breast Symptom    She has a h/o breast implants  Shortly after surgery, her left nipple started to invert  Several years later, the right nipple inverted as well    For the last several months she has been discomfort in both breasts    Last mammogram in 10/2022 was normal       Arthralgia    Chronic. She does get foot pain, but they curl/cramp until her carbidopa-levodopa kicks in.    L hip has been sore a lot recently  Both shoulders are achy - but recently had R shoulder surgery  Right medial ankle - thinks from the cramping of feet    Comes and goes. Reports she was treated for Lyme disease in the past. Unsure how quickly after exposure she was treated.          Myalgia (Resolved)    L hip has been sore a lot recently  Both shoulders are achy - but recently had R shoulder surgery  Left medial ankle    Comes and goes. Reports she was treated for Lyme disease in the past. Unsure how quickly after exposure she was treated.        Ponv (Postoperative Nausea and Vomiting) (Resolved)   Pertussis (Resolved)   Dyspnea (Resolved)   Chest Tightness (Resolved)       Health Maintenance: Completed    ROS:  Review of Systems   Constitutional:  Negative for chills and fever.   Respiratory:  Negative for shortness of breath.    Cardiovascular:  Negative for chest pain.       Objective:     Exam:  /64 (BP Location: Right arm, Patient Position: Sitting, BP Cuff Size: Adult)   Pulse 88   Temp 36.9 °C (98.4 °F) (Temporal)   Ht 1.524 m (5')   Wt 47.6 kg (105 lb)   SpO2 97%   BMI 20.51 kg/m²  Body mass index is 20.51 kg/m².    Physical Exam  Constitutional:       Appearance: Normal appearance.   Cardiovascular:      Rate and Rhythm: Normal rate and regular rhythm.      Heart sounds: Normal heart sounds.   Pulmonary:      Effort: Pulmonary effort is normal.      Breath sounds: Normal breath sounds.   Chest:   Breasts:     Right:  Inverted nipple and tenderness present. No mass, nipple discharge or skin change.      Left: Inverted nipple and tenderness present. No mass, nipple discharge or skin change.   Musculoskeletal:      Cervical back: Normal range of motion and neck supple.   Lymphadenopathy:      Upper Body:      Right upper body: No supraclavicular or axillary adenopathy.      Left upper body: No supraclavicular or axillary adenopathy.   Neurological:      Mental Status: Mariela Huff is alert.       A chaperone was offered to the patient during today's exam.: Chaperone Ginny Oleary was present.      Assessment & Plan:     62 y.o. adult with the following -     1. Breast symptom  Chronic, stable.  For several months has been getting discomfort of the nipple and areola bilaterally.  She does have history of breast implants and shortly after surgery the left nipple became inverted and a few years later the right nipple also inverted.  Likely secondary to scar tissue and architectural distortion but now that there is tenderness we will evaluate further.  Breast exam was benign with no masses and her mammogram in October, 2022 was normal.  Therefore, we will send her to a breast surgeon to discuss options and further work-up.  - Referral to General Surgery    2. Arthralgia, unspecified joint  Chronic, progressing.  For years she has had pain in her feet which she suspects is likely due to Parkinson's.  She is now getting pain in her hip and her shoulders.  She is concerned about possible late manifestation of Lyme arthritis that she was treated for Lyme disease in 2014.  However, Lyme arthritis typically causes swelling and only affects large joints as, especially the knee.  Since she is not having any swelling this may actually be osteoarthritis.  For now we will monitor this.  She knows to return if anything changes or she develops swelling.      3. Hypothyroidism, unspecified type  Chronic, unstable.  Recent labs show a  suppressed TSH and elevated T4 indicating she is on too much levothyroxine.  - Decrease levothyroxine to 75 mcg 6 days/week    4. Colon cancer screening  - Referral to GI for Colonoscopy    Referral for genetic research was offered. Patient is a participant.    Return in about 6 months (around 11/30/2023) for Annual/wellness visit.    Please note that this dictation was created using voice recognition software. I have made every reasonable attempt to correct obvious errors, but I expect that there are errors of grammar and possibly content that I did not discover before finalizing the note.

## 2023-07-12 DIAGNOSIS — N64.59 INVERTED NIPPLE: ICD-10-CM

## 2023-07-12 DIAGNOSIS — N64.59 BREAST SYMPTOM: ICD-10-CM

## 2023-07-13 DIAGNOSIS — E03.9 HYPOTHYROIDISM, UNSPECIFIED TYPE: ICD-10-CM

## 2023-07-13 NOTE — PROGRESS NOTES
Changed levothyroxine to 75 mcg 6 days per week. Recheck TSH to see if this dose is more appropriate.

## 2023-07-19 ENCOUNTER — HOSPITAL ENCOUNTER (OUTPATIENT)
Dept: LAB | Facility: MEDICAL CENTER | Age: 63
End: 2023-07-19
Attending: FAMILY MEDICINE
Payer: COMMERCIAL

## 2023-07-19 DIAGNOSIS — E03.9 HYPOTHYROIDISM, UNSPECIFIED TYPE: ICD-10-CM

## 2023-07-19 LAB — TSH SERPL DL<=0.005 MIU/L-ACNC: 1.78 UIU/ML (ref 0.38–5.33)

## 2023-07-19 PROCEDURE — 36415 COLL VENOUS BLD VENIPUNCTURE: CPT

## 2023-07-19 PROCEDURE — 84443 ASSAY THYROID STIM HORMONE: CPT

## 2023-07-26 ENCOUNTER — HOSPITAL ENCOUNTER (OUTPATIENT)
Dept: RADIOLOGY | Facility: MEDICAL CENTER | Age: 63
End: 2023-07-26
Attending: FAMILY MEDICINE
Payer: COMMERCIAL

## 2023-07-26 DIAGNOSIS — N64.59 BREAST SYMPTOM: ICD-10-CM

## 2023-07-26 DIAGNOSIS — N64.59 INVERTED NIPPLE: ICD-10-CM

## 2023-07-26 PROCEDURE — G0279 TOMOSYNTHESIS, MAMMO: HCPCS

## 2023-07-26 PROCEDURE — 76642 ULTRASOUND BREAST LIMITED: CPT | Mod: RT

## 2023-08-08 ENCOUNTER — APPOINTMENT (RX ONLY)
Dept: URBAN - METROPOLITAN AREA CLINIC 6 | Facility: CLINIC | Age: 63
Setting detail: DERMATOLOGY
End: 2023-08-08

## 2023-08-08 DIAGNOSIS — D18.0 HEMANGIOMA: ICD-10-CM

## 2023-08-08 DIAGNOSIS — L82.1 OTHER SEBORRHEIC KERATOSIS: ICD-10-CM

## 2023-08-08 DIAGNOSIS — D22 MELANOCYTIC NEVI: ICD-10-CM

## 2023-08-08 DIAGNOSIS — Z71.89 OTHER SPECIFIED COUNSELING: ICD-10-CM

## 2023-08-08 DIAGNOSIS — L98.8 OTHER SPECIFIED DISORDERS OF THE SKIN AND SUBCUTANEOUS TISSUE: ICD-10-CM

## 2023-08-08 DIAGNOSIS — L81.4 OTHER MELANIN HYPERPIGMENTATION: ICD-10-CM

## 2023-08-08 DIAGNOSIS — L84 CORNS AND CALLOSITIES: ICD-10-CM

## 2023-08-08 PROBLEM — D22.39 MELANOCYTIC NEVI OF OTHER PARTS OF FACE: Status: ACTIVE | Noted: 2023-08-08

## 2023-08-08 PROBLEM — D18.01 HEMANGIOMA OF SKIN AND SUBCUTANEOUS TISSUE: Status: ACTIVE | Noted: 2023-08-08

## 2023-08-08 PROBLEM — D22.5 MELANOCYTIC NEVI OF TRUNK: Status: ACTIVE | Noted: 2023-08-08

## 2023-08-08 PROCEDURE — ? SUNSCREEN RECOMMENDATIONS

## 2023-08-08 PROCEDURE — 99396 PREV VISIT EST AGE 40-64: CPT

## 2023-08-08 PROCEDURE — ? COUNSELING

## 2023-08-08 PROCEDURE — ? SUNSCREEN TREATMENT REGIMEN

## 2023-08-08 ASSESSMENT — LOCATION SIMPLE DESCRIPTION DERM
LOCATION SIMPLE: RIGHT LIP
LOCATION SIMPLE: LEFT CHEEK
LOCATION SIMPLE: RIGHT HAND
LOCATION SIMPLE: CHEST
LOCATION SIMPLE: ABDOMEN
LOCATION SIMPLE: RIGHT THUMB
LOCATION SIMPLE: LEFT HAND
LOCATION SIMPLE: RIGHT BREAST
LOCATION SIMPLE: CHIN

## 2023-08-08 ASSESSMENT — LOCATION ZONE DERM
LOCATION ZONE: FACE
LOCATION ZONE: LIP
LOCATION ZONE: HAND
LOCATION ZONE: TRUNK
LOCATION ZONE: FINGER

## 2023-08-08 ASSESSMENT — LOCATION DETAILED DESCRIPTION DERM
LOCATION DETAILED: RIGHT MEDIAL SUPERIOR CHEST
LOCATION DETAILED: LEFT ULNAR DORSAL HAND
LOCATION DETAILED: PERIUMBILICAL SKIN
LOCATION DETAILED: RIGHT DORSAL MIDDLE METACARPOPHALANGEAL JOINT
LOCATION DETAILED: RIGHT RADIAL DORSAL HAND
LOCATION DETAILED: RIGHT DISTAL RADIAL THUMB
LOCATION DETAILED: RIGHT INFERIOR VERMILION LIP
LOCATION DETAILED: RIGHT MEDIAL BREAST 4-5:00 REGION
LOCATION DETAILED: RIGHT MENTAL CREASE
LOCATION DETAILED: EPIGASTRIC SKIN
LOCATION DETAILED: LEFT INFERIOR MEDIAL MALAR CHEEK

## 2023-08-08 NOTE — PROCEDURE: MIPS QUALITY
Quality 111:Pneumonia Vaccination Status For Older Adults: Patient received any pneumococcal conjugate or polysaccharide vaccine on or after their 60th birthday and before the end of the measurement period
Quality 130: Documentation Of Current Medications In The Medical Record: Current Medications Documented
Quality 226: Preventive Care And Screening: Tobacco Use: Screening And Cessation Intervention: Patient screened for tobacco use and is an ex/non-smoker
Detail Level: Detailed
Quality 431: Preventive Care And Screening: Unhealthy Alcohol Use - Screening: Patient not identified as an unhealthy alcohol user when screened for unhealthy alcohol use using a systematic screening method

## 2023-08-14 RX ORDER — LEVOTHYROXINE SODIUM 0.07 MG/1
TABLET ORAL
Qty: 72 TABLET | Refills: 4 | Status: SHIPPED | OUTPATIENT
Start: 2023-08-14

## 2023-08-24 ENCOUNTER — TELEPHONE (OUTPATIENT)
Dept: NEUROLOGY | Facility: MEDICAL CENTER | Age: 63
End: 2023-08-24

## 2023-08-28 ENCOUNTER — TELEPHONE (OUTPATIENT)
Dept: NEUROLOGY | Facility: MEDICAL CENTER | Age: 63
End: 2023-08-28
Payer: COMMERCIAL

## 2023-08-30 ENCOUNTER — NON-PROVIDER VISIT (OUTPATIENT)
Dept: GENETICS | Facility: MEDICAL CENTER | Age: 63
End: 2023-08-30
Payer: COMMERCIAL

## 2023-08-30 DIAGNOSIS — Z80.9 FAMILY HISTORY OF CANCER: ICD-10-CM

## 2023-08-30 DIAGNOSIS — N64.59 BREAST SYMPTOM: ICD-10-CM

## 2023-08-30 NOTE — PROGRESS NOTES
Mariela Huff is a 62 y.o. adult here for a non-provider visit for: Lab Draws  on 8/30/2023 at 1:36 PM    Procedure Performed: Venipuncture     Anatomical site: Left Antecubital Area (AC)    Equipment used: 25 butterfly    Labs drawn: Wikidot (two lavender EDTA tubes)    Ordering Provider: [employer] [facility]    Marcelo By: Maddi Vitale, Med Ass't

## 2023-09-01 ENCOUNTER — OFFICE VISIT (OUTPATIENT)
Dept: NEUROLOGY | Facility: MEDICAL CENTER | Age: 63
End: 2023-09-01
Attending: PSYCHIATRY & NEUROLOGY
Payer: COMMERCIAL

## 2023-09-01 VITALS
TEMPERATURE: 98 F | BODY MASS INDEX: 21.6 KG/M2 | RESPIRATION RATE: 16 BRPM | HEART RATE: 87 BPM | HEIGHT: 60 IN | DIASTOLIC BLOOD PRESSURE: 60 MMHG | WEIGHT: 110.01 LBS | SYSTOLIC BLOOD PRESSURE: 88 MMHG | OXYGEN SATURATION: 97 %

## 2023-09-01 DIAGNOSIS — G20.A1 PARKINSON'S DISEASE (HCC): ICD-10-CM

## 2023-09-01 DIAGNOSIS — G44.89 OTHER HEADACHE SYNDROME: ICD-10-CM

## 2023-09-01 DIAGNOSIS — G44.86 CERVICOGENIC HEADACHE: ICD-10-CM

## 2023-09-01 DIAGNOSIS — H53.2 DOUBLE VISION: ICD-10-CM

## 2023-09-01 PROCEDURE — 99212 OFFICE O/P EST SF 10 MIN: CPT | Performed by: PSYCHIATRY & NEUROLOGY

## 2023-09-01 PROCEDURE — 3074F SYST BP LT 130 MM HG: CPT | Performed by: PSYCHIATRY & NEUROLOGY

## 2023-09-01 PROCEDURE — 3078F DIAST BP <80 MM HG: CPT | Performed by: PSYCHIATRY & NEUROLOGY

## 2023-09-01 PROCEDURE — 99213 OFFICE O/P EST LOW 20 MIN: CPT | Performed by: PSYCHIATRY & NEUROLOGY

## 2023-09-01 ASSESSMENT — FIBROSIS 4 INDEX: FIB4 SCORE: 4.57

## 2023-09-01 NOTE — PROGRESS NOTES
Chief Complaint   Patient presents with    Follow-Up     Parkinson's disease       History of present illness:  Mariela Huff 62 y.o. adult with Parkinson's disease since 2017 with OFF toe curling dystonia s/p botox.       PD Meds:  6am - 1 tablet of carb/levo , 2mg of trihexyphenidyl  7:30am - 1 tablet of CR carb/levo   10am - 1/2 tablet of carb/levo    12:30pm - 1/2 tablet of carb/levo CR , 1/2 tablet of trihexyphenidyl 2mg  4:30pm - 1/2 tablet of carb/levo    7:30pm - 1 tablet of carb/levo CR  and 1/2 tablet of trihexyphenidyl     She has had right sided cramping and artane has been helpful for treatment of this. Her main issue is curling of her toes and turning inwards of her ankles first thing in the morning. This tends to occur before her first dose of sinemet or in between doses of sinemet.   The immediate release sinemet caused involuntary movement and poor coordination of her left leg. The CR sinemet does not cause this problem as much. The CR dose takes about 45 minutes to 1 hour to kick in.        12/14/21:  She has had bilateral toe curling as an OFF symptom. Currently with every 5 hour dosing of Sinemet CR she is not experiencing this however does have early morning dystonia. She has been unable to tolerate immediate release Sinemet due to what sounds like lower extremity dyskinesia. I have prescribed 1 tablet of Sinemet immediate release to be given in the morning for her early morning OFF. This is in addition to her current regimen of Sinemet CR.  She is H&Y stage 1. A potential adjunctive treatment is a dopamine agonist if fluctuations worsen. However currently with her current schedule of Sinemet CR this is not required.     6/15/22: She has had frequent headaches since the last visit. The headaches are located in the back of the head. She denies history of headache. She is able to function with her headache. The headaches were occurring in March/April  "but the last few weeks are improved.   Her gait will \"tighten up\" and she has trouble walking smoothly occasionally. This may resolve on its own after resting but occasionally persists for hours. She also occasionally notices involuntary movements that do not impair her activities.      11/2/22: She continues to experience the dystonia in her feet in the morning. After her first dose in the AM, the dystonia of the foot resolved. It takes 45 minutes to 1 hour for that first dose to kick in.   Patient is providing a history of off dystonia that is bothersome, causing her foot to turned inwards and cramp.  It is worse in the morning and at the end of her afternoon doses.  I have counseled her to increase the dose of the first morning dose of immediate release carbidopa levodopa so that it kicks in faster.  She will increase to 1-1/2 tablets once daily.  The patient will also increase the frequency of the carbidopa levodopa CR to 4 times daily, with 1 is 1/2 tablets 3 times daily and 1 tablet in the evening.  Will wean off of trihexyphenidyl, given that she is having dry mouth and possible brain fog as side effects.  I have counseled her on the potential of the dystonia to worsen after discontinuation of Artane.      3/9/23: She resumed trihexypehnidyl 2mg in the morning and 1mg twice daily for return of foot cramping, and discomfort at night. She has morning stiffness at 11-12pm. Her ankles curve in and she has tightness in her hips.       9/1/23: She has been having worse dry mouth. She uses trihexyphenidyl for joint pains/aches. She has been having a headache since straining her head. The neck strain was July 16, 2022. The neck initially was very painful and she could not move it, but now is improving, however she has been having numb left toes and vertical double vision.     Movement-Specific ROS  Sleep: No problems    Constipation: Has a daily bowel movement    Urination: No   Dizziness: Has had vertigo " intermittently in the last few months   Hallucinations: No    Cognition: Occasional word finding difficulty   Excessive daytime somnolence: She occasionally naps in the afternoon after her second dose of levodopa   Balance: No problems   Exercise: Does rock steady boxing 3 times per week and yoga several times weekly.       Past medical history:   Past Medical History:   Diagnosis Date    Anesthesia     severe ponv, hard to wake up    Arthritis     shoulders    Chest tightness     Cough     Diverticulosis 2012    pt stated it was noted on colonoscopy    Hypothyroid     hypothyroid    Infectious disease     around bronchitis/flu 12/2011    Lyme disease     chronic    Pain 04/2018    right foot, bilateral shoulder    Painful breathing     Shortness of breath        Past surgical history:   Past Surgical History:   Procedure Laterality Date    PB SHLDR ARTHROSCOP,SURG,W/ROTAT CUFF REPB Right 12/14/2022    Procedure: RIGHT SHOULDER ARTHROSCOPY ROTATOR CUFF REPAIR;  Surgeon: Mildred Hu M.D.;  Location: [facility];  Service: Orthopedics    PB REPAIR BICEPS LONG TENDON Right 12/14/2022    Procedure: RIGHT BICEPS TENODESIS;  Surgeon: Mildred Hu M.D.;  Location: [facility];  Service: Orthopedics    CA SHLDR ARTHROSCOP,PART ACROMIOPLAS Right 12/14/2022    Procedure: RIGHT SUBACROMIAL DECOMPRESSION, LABRAL DEBRIDEMENT, REPAIRS AS INDICATED;  Surgeon: Mildred Hu M.D.;  Location: [facility];  Service: Orthopedics    TOE FUSION Right 5/7/2018    Procedure: TOE FUSION - 1ST MTP;  Surgeon: Janes Benavidez M.D.;  Location: SURGERY SAME DAY [facility];  Service: Orthopedics    REPAIR, TENDON, LOWER EXTREMITY, USING TENDON GRAFT Right 5/7/2018    Procedure: FLEXOR TENDON REPAIR - 4TH RELEASE W/SOFT TISSUE RELEASE 2/3;  Surgeon: Janes Benavidez M.D.;  Location: SURGERY SAME DAY [facility];  Service: Orthopedics    HARDWARE REMOVAL ORTHO Right  5/7/2018    Procedure: HARDWARE REMOVAL ORTHO - INTERNAL FIXATION;  Surgeon: Janes Benavidez M.D.;  Location: SURGERY SAME DAY [facility];  Service: Orthopedics    ORTHOPEDIC OSTEOTOMY Right 5/7/2018    Procedure: ORTHOPEDIC OSTEOTOMY - DISTAL METATARSAL 2/3/4;  Surgeon: Janes Benavidez M.D.;  Location: SURGERY SAME DAY [facility];  Service: Orthopedics    BUNIONECTOMY Right 5/1/2017    Procedure: BUNIONECTOMY - PROXIMAL HALLUX VALGUS CORRECTION W/BONE GRAFT;  Surgeon: Janes Benavidez M.D.;  Location: SURGERY [facility];  Service:     NEUROMA EXCISION  5/1/2017    Procedure: NEUROMA EXCISION - 2ND WEBSPACE;  Surgeon: Janes Benavidez M.D.;  Location: SURGERY [facility];  Service:     BREAST IMPLANT REVISION Bilateral 4/11/2016    Procedure: BREAST IMPLANT EXCHANGE OF SALINE TO SILICONE W/REPOSITIONING OF LATERAL FOLDS;  Surgeon: Mikey Adkins M.D.;  Location: SURGERY [facility];  Service:     BREAST BIOPSY  1/24/2012    Performed by SANDRA ESTRADA at SURGERY SAME DAY [facility]    SHOULDER ARTHROSCOPY  12/1/2010    Performed by KATE CHANCE at SURGERY [facility]    SHOULDER DECOMPRESSION  12/1/2010    Performed by KATE CHANCE at SURGERY [facility]    ROTATOR CUFF REPAIR  12/1/2010    Performed by KATE CHANCE at SURGERY [facility]    SHOULDER DECOMPRESSION ARTHROSCOPIC  5/13/2009    Performed by KATE CHANCE at SURGERY [facility]    ROTATOR CUFF REPAIR  5/13/2009    Performed by KATE CHANCE at SURGERY [facility]    HYSTERECTOMY, VAGINAL  1999    fibroids    MO BREAST AUGMENTATION WITH IMPLANT Bilateral 1999    OTHER ORTHOPEDIC SURGERY  1993    joint repair left thumb    US-NEEDLE CORE BX-BREAST PANEL         Family history:   Family History   Problem Relation Age of Onset    Cancer Paternal Aunt         breast    Diabetes Mother     Thyroid Mother     Diabetes Father     Thyroid Daughter     Thyroid Sister     Cancer Paternal Aunt          breast, uterine    Cancer Paternal Aunt         bladder    Cancer Paternal Aunt         colon    Heart Disease Neg Hx     Stroke Neg Hx     Alcohol/Drug Neg Hx        Social history:   Social History     Socioeconomic History    Marital status:      Spouse name: Not on file    Number of children: Not on file    Years of education: Not on file    Highest education level: Bachelor's degree (e.g., BA, AB, BS)   Occupational History    Not on file   Tobacco Use    Smoking status: Never    Smokeless tobacco: Never   Vaping Use    Vaping Use: Never used   Substance and Sexual Activity    Alcohol use: Yes     Alcohol/week: 0.6 oz     Types: 1 Glasses of wine per week     Comment: Occasionally    Drug use: No    Sexual activity: Yes     Partners: Male   Other Topics Concern    Not on file   Social History Narrative    Not on file     Social Determinants of Health     Financial Resource Strain: Low Risk  (1/12/2021)    Overall Financial Resource Strain (CARDIA)     Difficulty of Paying Living Expenses: Not hard at all   Food Insecurity: No Food Insecurity (1/12/2021)    Hunger Vital Sign     Worried About Running Out of Food in the Last Year: Never true     Ran Out of Food in the Last Year: Never true   Transportation Needs: No Transportation Needs (1/12/2021)    PRAPARE - Transportation     Lack of Transportation (Medical): No     Lack of Transportation (Non-Medical): No   Physical Activity: Sufficiently Active (1/12/2021)    Exercise Vital Sign     Days of Exercise per Week: 5 days     Minutes of Exercise per Session: 50 min   Stress: No Stress Concern Present (1/12/2021)    [place] [employer] - Occupational Stress Questionnaire     Feeling of Stress : Not at all   Social Connections: Unknown (1/12/2021)    Social Connection and Isolation Panel [NHANES]     Frequency of Communication with Friends and Family: More than three times a week     Frequency of Social Gatherings with Friends and  Family: Once a week     Attends Judaism Services: Patient refused     Active Member of Clubs or Organizations: Yes     Attends Club or Organization Meetings: More than 4 times per year     Marital Status:    Intimate Partner Violence: Not on file   Housing Stability: Low Risk  (1/12/2021)    Housing Stability Vital Sign     Unable to Pay for Housing in the Last Year: No     Number of Places Lived in the Last Year: 1     Unstable Housing in the Last Year: No       Current medications:   Current Outpatient Medications   Medication    levothyroxine (SYNTHROID) 75 MCG Tab    trihexyphenidyl (ARTANE) 2 MG Tab    carbidopa-levodopa (SINEMET)  MG Tab    carbidopa-levodopa CR (SINEMET CR)  MG per tablet    Cyanocobalamin (B-12) 3000 MCG SL Tab    Ascorbic Acid (VITAMIN C) 500 MG Chew Tab    Cholecalciferol (VITAMIN D) 50 MCG (2000 UT) Cap    famotidine (PEPCID) 20 MG Tab     No current facility-administered medications for this visit.       Medication Allergy:  Allergies   Allergen Reactions    Pcn [Penicillins] Rash     Rash      Percocet [Oxycodone-Acetaminophen] Vomiting    Symbicort [Budesonide-Formoterol Fumarate]      Laryngitis        Physical examination:   Vitals:    09/01/23 0952 09/01/23 0956   BP: 98/60 (!) 88/60   BP Location: Left arm Left arm   Patient Position: Sitting Sitting   BP Cuff Size: Adult Adult   Pulse: 85 87   Resp: 16    Temp: 36.7 °C (98 °F)    TempSrc: Temporal    SpO2:  97%   Weight: 49.9 kg (110 lb 0.2 oz)    Height: 1.524 m (5')      Neurological Exam  Mental Status  Awake and alert. Speech is normal. Language is fluent with no aphasia.    Cranial Nerves  CN III, IV, VI: Extraocular movements intact bilaterally. No nystagmus. Normal smooth pursuit.  CN V:  Right: Facial sensation is normal.  Left: Facial sensation is normal on the left.  CN VII:  Right: There is no facial weakness.  Left: There is no facial weakness.    Motor   Normal muscle tone. No abnormal involuntary  movements.    Coordination  Right: Finger-to-nose normal.Left: Finger-to-nose normal.    Gait  Casual gait is normal including stance, stride, and arm swing.       Labs:  I reviewed the following labs personally:  None    Imaging:   None    ASSESSMENT AND PLAN:  Problem List Items Addressed This Visit       Parkinson's disease (HCC)     Other Visit Diagnoses       Other headache syndrome        Cervicogenic headache        Relevant Orders    MR-BRAIN-W/O    MR-MRA NECK-W/O    Double vision        Relevant Orders    MR-BRAIN-W/O    MR-MRA NECK-W/O            1. Other headache syndrome    2. Cervicogenic headache  - MR-BRAIN-W/O; Future  - MR-MRA NECK-W/O; Future    3. Double vision  - MR-BRAIN-W/O; Future  - MR-MRA NECK-W/O; Future    She has reported recent onset of a bothersome headache requires use of analgesic medication.  She is not normally someone who experiences headaches.  This all started after severe neck strain, and is associated with double vision and imbalance.  I have ordered an MRI of the brain as well as MR angiogram of the neck to exclude cervical artery dissection.    4. Parkinson's disease (HCC)    Her Parkinson's disease is currently well treated on her current medication regimen.  No changes were made.  Parkinson's disease is mild and is not the main issue today.      FOLLOW-UP:   Return in about 6 weeks (around 10/13/2023).    Total time spent for the day for this patient unrelated to procedure time is: 25 minutes. I spent 21 minutes in face to face time and I spent 2 minutes pre-charting and 2 minutes in post-visit documentation.      Dr. Andrez Cam D.O.  [facility]

## 2023-09-01 NOTE — PATIENT INSTRUCTIONS
Lower the first daily dose of trihexyphenidyl to 1/2 tablet (1mg) due to dry mouth     I have ordered a brain MRI and a MR angiogram of the arteries of the neck

## 2023-09-07 ENCOUNTER — HOSPITAL ENCOUNTER (OUTPATIENT)
Dept: RADIOLOGY | Facility: MEDICAL CENTER | Age: 63
End: 2023-09-07
Attending: PSYCHIATRY & NEUROLOGY
Payer: COMMERCIAL

## 2023-09-07 DIAGNOSIS — H53.2 DOUBLE VISION: ICD-10-CM

## 2023-09-07 DIAGNOSIS — G44.86 CERVICOGENIC HEADACHE: ICD-10-CM

## 2023-09-07 PROCEDURE — 70551 MRI BRAIN STEM W/O DYE: CPT

## 2023-09-07 PROCEDURE — 70547 MR ANGIOGRAPHY NECK W/O DYE: CPT

## 2023-09-11 ENCOUNTER — PATIENT MESSAGE (OUTPATIENT)
Dept: NEUROLOGY | Facility: MEDICAL CENTER | Age: 63
End: 2023-09-11
Payer: COMMERCIAL

## 2023-09-11 DIAGNOSIS — G44.89 OTHER HEADACHE SYNDROME: ICD-10-CM

## 2023-09-11 DIAGNOSIS — G43.E09 CHRONIC MIGRAINE WITH AURA: ICD-10-CM

## 2023-09-12 RX ORDER — PROPRANOLOL HYDROCHLORIDE 20 MG/1
20 TABLET ORAL 2 TIMES DAILY
Qty: 180 TABLET | Refills: 2 | Status: SHIPPED | OUTPATIENT
Start: 2023-09-12 | End: 2023-11-16

## 2023-09-15 ENCOUNTER — OFFICE VISIT (OUTPATIENT)
Dept: MEDICAL GROUP | Facility: PHYSICIAN GROUP | Age: 63
End: 2023-09-15
Payer: COMMERCIAL

## 2023-09-15 VITALS
HEART RATE: 80 BPM | WEIGHT: 109 LBS | BODY MASS INDEX: 21.4 KG/M2 | TEMPERATURE: 98.1 F | HEIGHT: 60 IN | DIASTOLIC BLOOD PRESSURE: 68 MMHG | SYSTOLIC BLOOD PRESSURE: 106 MMHG | OXYGEN SATURATION: 97 %

## 2023-09-15 DIAGNOSIS — R68.2 DRY MOUTH: ICD-10-CM

## 2023-09-15 DIAGNOSIS — G44.221 CHRONIC TENSION-TYPE HEADACHE, INTRACTABLE: ICD-10-CM

## 2023-09-15 PROBLEM — R51.9 HEADACHE: Status: ACTIVE | Noted: 2023-09-15

## 2023-09-15 PROCEDURE — 99214 OFFICE O/P EST MOD 30 MIN: CPT | Performed by: FAMILY MEDICINE

## 2023-09-15 PROCEDURE — 3074F SYST BP LT 130 MM HG: CPT | Performed by: FAMILY MEDICINE

## 2023-09-15 PROCEDURE — 3078F DIAST BP <80 MM HG: CPT | Performed by: FAMILY MEDICINE

## 2023-09-15 ASSESSMENT — FIBROSIS 4 INDEX: FIB4 SCORE: 4.57

## 2023-09-15 NOTE — PROGRESS NOTES
Subjective:     CC: dry mouth, headache    HPI:   Mariela presents today with    Problem   Dry Mouth    Onset: 2-3 weeks ago  Used to get some dry mouth (just tongue) and it would self-resolve  Now it encompasses her whole mouth  She has tried biotene and sipping water all day long - no relief  She has also tried sucking on sugar-free lozenges/candies and tried chewing gum as well - helps but only very short term  She did get a high fluoride toothpaste from her dentist    No mouth pain.    Meds:   - artane - 30-50% anticholinergic SE  - famotidine     Headache    Onset: mid-July  Sudden clamping around posterior/base of skull  Couldn't move head for hours due to the pain  Took a leftover hydrocodone which helped  Full ROM didn't come back for several days    Now the headaches are recurrent  Medication use: tylenol 500 mg + naproxen 200 mg at night only    Precipitating factors: no      Tension-Type  Bilateral: yes   Non-pulsatile pain (usually pressing, tightening): pressure, squeezing  Mild to moderate: yes  Duration 30 min to 7 days: hours, unknown amount  Frequency: daily  Associated symptoms: w/o pericranial tenderness, no nausea    Migraine  At least 1 of the following:   - nausea or vomiting: no   - photophobia or phonophobia: no  2+ of the following:   - unilateral location: no   - pulsatile pain: no   - moderate to severe intensity: no   - exacerbated with activity: no    Trigeminal autonomic cephalalgias & cluster  Unilateral: no  Severe orbital, supraorbital, or temporal pain: no    Red Flags (if acute headache)  Systemic symptoms: no       Neoplasm history: no    Neurologic deficit: no    Onset is sudden/abrupt (thunderclap): yes - first headache only    Age >50: yes    Pattern change or recent onset: yes    Positional: yes - laying down worse  Precipitated by sneezing, coughing, or exercise: no    Progressive/atypical presentation: no    Pregnancy/postpartum: no    Painful eye with autonomic features: no     Posttraumatic onset: no   Pathology of immune system: no    Painkiller overuse/new med: no      She spoke with Neurology  MR brain - unremarkable  MRA neck - normal  They prescribed propranolol         Health Maintenance: Completed    ROS:  See HPI    Objective:     Exam:  /68 (BP Location: Right arm, Patient Position: Sitting, BP Cuff Size: Adult)   Pulse 80   Temp 36.7 °C (98.1 °F) (Temporal)   Ht 1.524 m (5')   Wt 49.4 kg (109 lb)   SpO2 97%   BMI 21.29 kg/m²  Body mass index is 21.29 kg/m².    Physical Exam  Constitutional:       Appearance: Normal appearance.   Cardiovascular:      Rate and Rhythm: Normal rate and regular rhythm.      Heart sounds: Normal heart sounds.   Pulmonary:      Effort: Pulmonary effort is normal.      Breath sounds: Normal breath sounds.   Musculoskeletal:      Cervical back: Normal range of motion and neck supple.   Neurological:      Mental Status: Mariela Huff is alert.             Assessment & Plan:     62 y.o. adult with the following -     1. Dry mouth  Acute.  She reports for the last 2 to 3 weeks she is eating severe dry mouth.  She reports that she is always had a little bit of dry mouth with the Artane but would self resolve but in the last couple weeks it is gotten very severe and encompasses the whole mouth.  She has tried artificial saliva and salivary stimulation with very little success.  Her mouth gets very dry quickly afterwards.  Her neurologist recommended she cut her morning dose of Artane in half to see if that helps which she has not tried yet.  We did discuss that there are a couple medications we could try but as long as she is on the Artane causing dry mouth she would have to take that medication.  Therefore, she is going to look at the medications and research them further and maybe try cutting down on the Artane or finding an alternative to the Artane with her neurologist.  She knows to contact me if she wants me to prescribe either  pilocarpine or cevimeline.    2. Chronic tension-type headache, intractable  Chronic, uncontrolled.  She reports in mid July she was under a car helping her  with something and when she lifted her head she had a sudden severe clamping pain around the posterior and base of her skull.  She for hours she had severe pain and she could not move her head because what exacerbated the pain.  She took a leftover hydrocodone from surgery which helped but for several days she still had a low-grade headache and her neck range of motion very slowly improved.  Now she reports that she continues to have a very slight version of that headache daily and it sounds like she wakes up with a headache and goes to sleep with a headache and has been this way since that first severe headache in mid July.  It is a pressure squeezing pain that currently is mild to moderate with no associated nausea, photophobia, phonophobia.  This sounds like a tension headache to me.  She does have some red flag symptoms-age over 50, recent onset, worse with laying down.  She is already spoken with her neurologist and had gotten an MRI of her brain and MRA of her neck which were both unremarkable.  Neurology prescribed her propranolol and she reports that that was for migraine headache.  Based on her description I suspect this is a tension headache likely from a significant neck strain.  However since she is having the headache pretty much constantly she would benefit from prophylactic treatment and I encouraged her to give the propranolol a try.    Referral for genetic research was offered. Patient is a participant.    Return if symptoms worsen or fail to improve.    Please note that this dictation was created using voice recognition software. I have made every reasonable attempt to correct obvious errors, but I expect that there are errors of grammar and possibly content that I did not discover before finalizing the note.

## 2023-09-28 ENCOUNTER — APPOINTMENT (OUTPATIENT)
Dept: NEUROLOGY | Facility: MEDICAL CENTER | Age: 63
End: 2023-09-28
Attending: PSYCHIATRY & NEUROLOGY
Payer: COMMERCIAL

## 2023-11-16 ENCOUNTER — OFFICE VISIT (OUTPATIENT)
Dept: NEUROLOGY | Facility: MEDICAL CENTER | Age: 63
End: 2023-11-16
Attending: INTERNAL MEDICINE
Payer: COMMERCIAL

## 2023-11-16 ENCOUNTER — TELEPHONE (OUTPATIENT)
Dept: NEUROLOGY | Facility: MEDICAL CENTER | Age: 63
End: 2023-11-16

## 2023-11-16 ENCOUNTER — PHARMACY VISIT (OUTPATIENT)
Dept: PHARMACY | Facility: MEDICAL CENTER | Age: 63
End: 2023-11-16
Payer: COMMERCIAL

## 2023-11-16 VITALS
OXYGEN SATURATION: 99 % | WEIGHT: 109.13 LBS | DIASTOLIC BLOOD PRESSURE: 66 MMHG | BODY MASS INDEX: 21.42 KG/M2 | HEIGHT: 60 IN | SYSTOLIC BLOOD PRESSURE: 110 MMHG | TEMPERATURE: 97.9 F | HEART RATE: 88 BPM

## 2023-11-16 DIAGNOSIS — G20.A2 PARKINSON'S DISEASE WITHOUT DYSKINESIA, WITH FLUCTUATING MANIFESTATIONS (HCC): Primary | ICD-10-CM

## 2023-11-16 DIAGNOSIS — G44.209 TENSION HEADACHE: ICD-10-CM

## 2023-11-16 DIAGNOSIS — G20.A1 PARKINSON'S DISEASE WITHOUT DYSKINESIA OR FLUCTUATING MANIFESTATIONS (HCC): ICD-10-CM

## 2023-11-16 PROCEDURE — RXMED WILLOW AMBULATORY MEDICATION CHARGE: Performed by: INTERNAL MEDICINE

## 2023-11-16 PROCEDURE — 3074F SYST BP LT 130 MM HG: CPT | Performed by: INTERNAL MEDICINE

## 2023-11-16 PROCEDURE — 99215 OFFICE O/P EST HI 40 MIN: CPT | Performed by: INTERNAL MEDICINE

## 2023-11-16 PROCEDURE — G2212 PROLONG OUTPT/OFFICE VIS: HCPCS | Performed by: INTERNAL MEDICINE

## 2023-11-16 PROCEDURE — 99212 OFFICE O/P EST SF 10 MIN: CPT | Performed by: INTERNAL MEDICINE

## 2023-11-16 PROCEDURE — 3078F DIAST BP <80 MM HG: CPT | Performed by: INTERNAL MEDICINE

## 2023-11-16 RX ORDER — LEVODOPA AND CARBIDOPA 95; 23.75 MG/1; MG/1
2 CAPSULE, EXTENDED RELEASE ORAL 4 TIMES DAILY
Qty: 720 CAPSULE | Refills: 3 | Status: SHIPPED | OUTPATIENT
Start: 2023-11-16 | End: 2023-11-30

## 2023-11-16 RX ORDER — TIZANIDINE 4 MG/1
4 TABLET ORAL EVERY 6 HOURS PRN
Qty: 30 TABLET | Refills: 3 | Status: SHIPPED | OUTPATIENT
Start: 2023-11-16 | End: 2023-12-16

## 2023-11-16 ASSESSMENT — FIBROSIS 4 INDEX: FIB4 SCORE: 4.65

## 2023-11-16 NOTE — TELEPHONE ENCOUNTER
Received Refill PA request via [facility]  for Carbidopa-Levodopa (Rytary) 23.75-95 MG CPCR. (Quantity:240, Day Supply:30) - Copay $25.00     Insurance: [employer]  Member ID:  [id number]  BIN: [id number]  PCN: [id number]  Group: [id number]     Ran Test claim via [place] & medication Request rec'd via [facility], RTC Analia, MARGI 720/90 however we can only fill for 30 DS #240/30 $25.00 copay after $60.00 M[employer] Voucher (please mention voucher to patient) notifying liaison and/or Outreach.

## 2023-11-16 NOTE — PROGRESS NOTES
"[facility]  [address] [place] [address], [address]. [place] [address]  Phone: [phone] Patient Name: Mariela Huff  : 1960  MRN: [id number]     ASSESSMENT / PLAN       Mariela Huff is a 63 y.o. adult with Parkinson's disease    Parkinson's disease  Bilateral foot dystonia  Dopamine responsive foot dystonia, but having frequent wearing off and inconsistent benefit despite complex regimen. Will try Rytary conversion from C/L IR + CR. LEDD 513mg  - Stop C/L IR + CR  - Start Rytary 95, two capsules four times a day (6AM, 11AM, 4PM, bedtime)  Samples provided today  2023 update: \"We are likely underdosing the Rytary compared to your current regimen. Let's try: Rytary 95mg, three capsules (instead of two), three times a day (6AM, 11AM, 4PM) or however you feel it best fits your schedule. You may end up needing the four times a day schedule which is fine too.\"  - continue trihexyphenydyl 2mg half-tab TID    Tension headache  Palpable tenderness in mid suboccipital region, worse when supine. Onset after straining neck working on car.   - tizanidine 4mg PRN nightly + naproxen 200mg PRN nightly  - Google or on YouTube: \"Ayan and Jagdeep neck exercises\"    Sinus drainage  - try OTC flonase/nasonex    - Return in about 2 months (around 2024).    Pasquale Santiago DO  Neurology, Movement Disorders Specialist    BILLING DOCUMENTATION:   I spent 70 minutes reviewing the medical record, interviewing and examining the patient, discussing diagnosis and treatment, and coordinating care.          HISTORY OF PRESENT ILLNESS      Mariela Huff is a 63 y.o. adult with Parkinson's disease since 2017 with OFF toe curling dystonia s/p botox.      Initial HPI  Per Dr. Calvillo's note 2018:  [facility] confirm the presence of suspected Parkinson's disease. I reviewed the reports both from the examining physician as well as diagnostic testing. The latter included Amelia testing showing " asymmetric dopamine transporter activity in the putamen bilaterally, left greater than right, as well as neurophysiology movement evaluation with EMG and EEG correlate, revealing increased tone on the right with an action-induced tremor as well as resting tremor consistent with Parkinson's disease, without EEG correlate consistent with nonepileptic involuntary movements. Blood work including Lyme titer, heavy metal screen, vasculitis screens, etc., all were negative/normal.     [facility] 9/2021 Dr. Ajit Figueroa:   right foot cramping began in 2010, but more definitively she developed right hand rest tremor in 2016, with progression to development of micrographia, loss of dexterity R > L, and unsteadiness by 2018     3/2021 injection pattern:  Right flexor digitorum longus 22.5 units +++  Right flexor hallucis longus 15 units ++  Right flexor digitorum brevis 7.5 units N/a no EMG used here   Total: 45 units  Wasted: 55 units     9/2021 injection pattern:  Right flexor digitorum longus 30 units +++  Right flexor hallucis longus 20 units ++  Right posterior tibialis 20 ++  Right flexor digitorum brevis 15 units N/a no EMG used here   Total: 85 units  Wasted: 15 units   - reported minimal benefit jeane in morning when dystonia is worst.    Current regimen  6AM, C/L IR 25/100 1 tab  730AM C/L CR 25/100 + half artane 2mg  10AM C/L IR half-tab  1230 C/L CR and half-artane  5PM: C/L IR 1 tab  830PM C/L CR + half-artane    Sfx:  Dry mouth better with lower dose of Artane  Sleepy in afternoon     Latency: 45min  Duration: no clear fluctuations  Efficacy: helps with bilateral foot dystonia L>R.   Dyskinesia/Dystonia: dyskinesia with right foot twitching.     Clinical summary  9/2023: Dr. Cam: MRI Brain and MRA Neck due to new onset headaches. No new changes on imaging.   11/16/23: first visit with me. Trial Rytary instead of C/L IR+CR.      ROS:  Orthostasis: no issues aside from rare occasions stretching   Constipation: no  issues  Urinary issues:   Speech/Swallow:   Cognition:   Mood:   Hallucinations:   Sleep/RBD: headaches affecting sleep, cats wake her up. Sleeps 10PM - 6:30PM  Headaches  Tension type headache, u-shaped. Looking down into car.   Worse when supine. Takes tylenol and aleve at bedtime. Wakes up at night due to pain   Pain described at base of skull.   Visual changes at night: overlap in vision downwards even with glasses.   If skip medication, can worsen.       Past Medical History:   Diagnosis Date    Anesthesia     severe ponv, hard to wake up    Arthritis     shoulders    Chest tightness     Cough     Diverticulosis 2012    pt stated it was noted on colonoscopy    Hypothyroid     hypothyroid    Infectious disease     around bronchitis/flu 12/2011    Lyme disease     chronic    Pain 04/2018    right foot, bilateral shoulder    Painful breathing     Shortness of breath        Past Surgical History:   Procedure Laterality Date    PB SHLDR ARTHROSCOP,SURG,W/ROTAT CUFF REPB Right 12/14/2022    Procedure: RIGHT SHOULDER ARTHROSCOPY ROTATOR CUFF REPAIR;  Surgeon: Mildred Hu M.D.;  Location: [facility];  Service: Orthopedics    PB REPAIR BICEPS LONG TENDON Right 12/14/2022    Procedure: RIGHT BICEPS TENODESIS;  Surgeon: Mildred Hu M.D.;  Location: [facility];  Service: Orthopedics    VA SHLDR ARTHROSCOP,PART ACROMIOPLAS Right 12/14/2022    Procedure: RIGHT SUBACROMIAL DECOMPRESSION, LABRAL DEBRIDEMENT, REPAIRS AS INDICATED;  Surgeon: Mildred Hu M.D.;  Location: [facility];  Service: Orthopedics    TOE FUSION Right 5/7/2018    Procedure: TOE FUSION - 1ST MTP;  Surgeon: Janes Benavidez M.D.;  Location: SURGERY SAME DAY [facility];  Service: Orthopedics    REPAIR, TENDON, LOWER EXTREMITY, USING TENDON GRAFT Right 5/7/2018    Procedure: FLEXOR TENDON REPAIR - 4TH RELEASE W/SOFT TISSUE RELEASE 2/3;  Surgeon: Janes Benavidez M.D.;   Location: SURGERY SAME DAY [facility];  Service: Orthopedics    HARDWARE REMOVAL ORTHO Right 5/7/2018    Procedure: HARDWARE REMOVAL ORTHO - INTERNAL FIXATION;  Surgeon: Janes Benavidez M.D.;  Location: SURGERY SAME DAY [facility];  Service: Orthopedics    ORTHOPEDIC OSTEOTOMY Right 5/7/2018    Procedure: ORTHOPEDIC OSTEOTOMY - DISTAL METATARSAL 2/3/4;  Surgeon: Janes Benavidez M.D.;  Location: SURGERY SAME DAY [facility];  Service: Orthopedics    BUNIONECTOMY Right 5/1/2017    Procedure: BUNIONECTOMY - PROXIMAL HALLUX VALGUS CORRECTION W/BONE GRAFT;  Surgeon: Janes Benavidez M.D.;  Location: SURGERY [facility];  Service:     NEUROMA EXCISION  5/1/2017    Procedure: NEUROMA EXCISION - 2ND WEBSPACE;  Surgeon: Janes Benavidez M.D.;  Location: SURGERY [facility];  Service:     BREAST IMPLANT REVISION Bilateral 4/11/2016    Procedure: BREAST IMPLANT EXCHANGE OF SALINE TO SILICONE W/REPOSITIONING OF LATERAL FOLDS;  Surgeon: Mikey Adkins M.D.;  Location: SURGERY [facility];  Service:     BREAST BIOPSY  1/24/2012    Performed by SANDRA ESTRADA at SURGERY SAME DAY [facility]    SHOULDER ARTHROSCOPY  12/1/2010    Performed by KATE CHANCE at SURGERY [facility]    SHOULDER DECOMPRESSION  12/1/2010    Performed by KATE CHANCE at SURGERY [facility]    ROTATOR CUFF REPAIR  12/1/2010    Performed by KATE CHANCE at SURGERY [facility]    SHOULDER DECOMPRESSION ARTHROSCOPIC  5/13/2009    Performed by KATE CHANCE at SURGERY [facility]    ROTATOR CUFF REPAIR  5/13/2009    Performed by KATE CHANCE at SURGERY [facility]    HYSTERECTOMY, VAGINAL  1999    fibroids    [place] BREAST AUGMENTATION WITH IMPLANT Bilateral 1999    OTHER ORTHOPEDIC SURGERY  1993    joint repair left thumb    US-NEEDLE CORE BX-BREAST PANEL         Family History   Problem Relation Age of Onset    Cancer Paternal Aunt         breast    Diabetes Mother     Thyroid Mother     Diabetes Father      Thyroid Daughter     Thyroid Sister     Cancer Paternal Aunt         breast, uterine    Cancer Paternal Aunt         bladder    Cancer Paternal Aunt         colon    Heart Disease Neg Hx     Stroke Neg Hx     Alcohol/Drug Neg Hx        Social History     Socioeconomic History    Marital status:      Spouse name: Not on file    Number of children: Not on file    Years of education: Not on file    Highest education level: Bachelor's degree (e.g., BA, AB, BS)   Occupational History    Not on file   Tobacco Use    Smoking status: Never    Smokeless tobacco: Never   Vaping Use    Vaping Use: Never used   Substance and Sexual Activity    Alcohol use: Yes     Alcohol/week: 0.6 oz     Types: 1 Glasses of wine per week     Comment: Occasionally    Drug use: No    Sexual activity: Yes     Partners: Male   Other Topics Concern    Not on file   Social History Narrative    Not on file     Social Determinants of Health     Financial Resource Strain: Low Risk  (1/12/2021)    Overall Financial Resource Strain (CARDIA)     Difficulty of Paying Living Expenses: Not hard at all   Food Insecurity: No Food Insecurity (1/12/2021)    Hunger Vital Sign     Worried About Running Out of Food in the Last Year: Never true     Ran Out of Food in the Last Year: Never true   Transportation Needs: No Transportation Needs (1/12/2021)    PRAPARE - Transportation     Lack of Transportation (Medical): No     Lack of Transportation (Non-Medical): No   Physical Activity: Sufficiently Active (1/12/2021)    Exercise Vital Sign     Days of Exercise per Week: 5 days     Minutes of Exercise per Session: 50 min   Stress: No Stress Concern Present (1/12/2021)    [employer] [employer] [employer] - Occupational Stress Questionnaire     Feeling of Stress : Not at all   Social Connections: Unknown (1/12/2021)    Social Connection and Isolation Panel [NHANES]     Frequency of Communication with Friends and Family: More than three times a week      Frequency of Social Gatherings with Friends and Family: Once a week     Attends Muslim Services: Patient refused     Active Member of Clubs or Organizations: Yes     Attends Club or Organization Meetings: More than 4 times per year     Marital Status:    Intimate Partner Violence: Not on file   Housing Stability: Low Risk  (1/12/2021)    Housing Stability Vital Sign     Unable to Pay for Housing in the Last Year: No     Number of Places Lived in the Last Year: 1     Unstable Housing in the Last Year: No       Current Outpatient Medications   Medication    Carbidopa-Levodopa ER 23.75-95 MG Cap CR    tizanidine (ZANAFLEX) 4 MG Tab    Carbidopa-Levodopa ER (RYTARY) 23.75-95 MG Cap CR    carbidopa-levodopa (SINEMET)  MG Tab    levothyroxine (SYNTHROID) 75 MCG Tab    trihexyphenidyl (ARTANE) 2 MG Tab    carbidopa-levodopa CR (SINEMET CR)  MG per tablet    famotidine (PEPCID) 20 MG Tab    Cyanocobalamin (B-12) 3000 MCG SL Tab    Ascorbic Acid (VITAMIN C) 500 MG Chew Tab    Cholecalciferol (VITAMIN D) 50 MCG (2000 UT) Cap     No current facility-administered medications for this visit.       Allergies   Allergen Reactions    Pcn [Penicillins] Rash     Rash      Percocet [Oxycodone-Acetaminophen] Vomiting    Symbicort [Budesonide-Formoterol Fumarate]      Laryngitis          OBJECTIVE      Vitals:    11/16/23 1049   BP: 110/66   Pulse: 88   Temp: 36.6 °C (97.9 °F)   TempSrc: Temporal   SpO2: 99%   Weight: 49.5 kg (109 lb 2 oz)   Height: 1.524 m (5')     Physical Exam     Last dose of C/L taken 10:30 (1 hour ago)     General: NAD, appears stated age.      Mental status: speech clear and fluent. Fund of knowledge is good.      Neck: midline suboccipital tenderness    Abnormal movements:    UPDRS Right Left   Finger tapping 1 0   Hand Movement 1 0   Toe Tapping 0 0   Leg Agility 0 0   Rigidity 2 1   Rest Tremor 0 0   Postural Tremor 1 1   Kinetic Tremor 1 1      No dystonia, dyskinesias, tics,  stereotypies, athetosis, akathisia, or chorea noted.      Cerebellar: No dysmetria with FTN or heel-to-shin.    Gait:   Posture - normal   Base - narrow   Stride length - normal   Arm swing - normal   Speed - normal  Shuffling/freezing - none  U-Turn - normal       DATA / RESULTS      9/2023:  MRI Brain  Unremarkable noncontrast MR examination of the brain except for a punctate left frontal subcortical T2 hyperintensity of no clinical significance.     MRA Neck  There is no flow-limiting stenosis in the visualized extracranial neck arteries.    PROCEDURE     N/A

## 2023-11-16 NOTE — PATIENT INSTRUCTIONS
"Rytary 95mg, two capsules four times a day (6AM, 11AM, 4PM, bedtime)  Or can try variations  Rytary 95mg, three capsules three times a day    Headache  - try muscle relaxer Tizanidine at bedtime + Aleve  - Google or on YouTube: \"Ayan and Jagdeep neck exercises\"  "

## 2023-11-28 ENCOUNTER — APPOINTMENT (OUTPATIENT)
Dept: NEUROLOGY | Facility: MEDICAL CENTER | Age: 63
End: 2023-11-28
Attending: INTERNAL MEDICINE
Payer: COMMERCIAL

## 2023-11-30 ENCOUNTER — TELEPHONE (OUTPATIENT)
Dept: NEUROLOGY | Facility: MEDICAL CENTER | Age: 63
End: 2023-11-30

## 2023-11-30 ENCOUNTER — OFFICE VISIT (OUTPATIENT)
Dept: MEDICAL GROUP | Facility: PHYSICIAN GROUP | Age: 63
End: 2023-11-30
Payer: COMMERCIAL

## 2023-11-30 VITALS
WEIGHT: 109.2 LBS | OXYGEN SATURATION: 96 % | TEMPERATURE: 97.9 F | HEART RATE: 84 BPM | SYSTOLIC BLOOD PRESSURE: 108 MMHG | HEIGHT: 60 IN | BODY MASS INDEX: 21.44 KG/M2 | DIASTOLIC BLOOD PRESSURE: 62 MMHG

## 2023-11-30 DIAGNOSIS — Z12.11 COLON CANCER SCREENING: ICD-10-CM

## 2023-11-30 DIAGNOSIS — Z00.00 WELLNESS EXAMINATION: Primary | ICD-10-CM

## 2023-11-30 DIAGNOSIS — G20.A1 PARKINSON'S DISEASE (HCC): ICD-10-CM

## 2023-11-30 PROCEDURE — 99396 PREV VISIT EST AGE 40-64: CPT | Performed by: FAMILY MEDICINE

## 2023-11-30 PROCEDURE — 3078F DIAST BP <80 MM HG: CPT | Performed by: FAMILY MEDICINE

## 2023-11-30 PROCEDURE — 3074F SYST BP LT 130 MM HG: CPT | Performed by: FAMILY MEDICINE

## 2023-11-30 RX ORDER — CARBIDOPA AND LEVODOPA 25; 100 MG/1; MG/1
1 TABLET, EXTENDED RELEASE ORAL 3 TIMES DAILY
Qty: 270 TABLET | Refills: 1 | Status: SHIPPED | OUTPATIENT
Start: 2023-11-30 | End: 2024-05-28

## 2023-11-30 ASSESSMENT — FIBROSIS 4 INDEX: FIB4 SCORE: 4.65

## 2023-11-30 NOTE — TELEPHONE ENCOUNTER
Received Refill PA request via [employer]  for Carbidopa-Levodopa CR (Sinemet CR)  MG Tabs. (Quantity:90, Day Supply:30) - Copay $18.26 - (PHARMACY NOT IN 90 DAY NETWORK, AND 90 DAY SUPPLY REQUIRED ON FUTURE FILLS MAXIMUM DAY SUPPLY  ALLOWED)     Insurance: [employer]  Member ID:  [id number]  BIN: [id number]  PCN: N/A  Group: [id number]     Ran Test claim via [employer] & medication Pays for a $18.26 copay. Will outreach to patient to offer specialty pharmacy services and or release to preferred pharmacy

## 2023-11-30 NOTE — PROGRESS NOTES
Subjective:     CC:   Chief Complaint   Patient presents with    Annual Exam     HPI:   Mariela Huff is a 63 y.o. female who presents for annual exam. She is feeling well and denies any complaints.    Ob-Gyn/ History:    Patient has GYN provider: no  /Para:  2/2  Last Pap Smear:  unknown. No history of abnormal pap smears.  Gyn Surgery:  hysterectomy.  Current Contraceptive Method:  N/a. Yes currently sexually active.  Last menstrual period:  N/a.  No significant bloating/fluid retention, pelvic pain, or dyspareunia. No vaginal discharge  Post-menopausal bleeding: no  Urinary incontinence: no  Folate intake: n/a     Health Maintenance  Advanced directive: n/a   Osteoporosis Screen/ DEXA: n/a   PT/vit D for falls prevention: n/a   Cholesterol Screening: ordered  Diabetes Screening: ordered  Aspirin Use: n/a     Anticipatory Guidance   Diet: healthy  Exercise: boxing class 3 times/week  Substance Abuse: no   Safe in relationship.  Seat belts, bike helmet, gun safety discussed.  Sun protection used.  Dental home.     Cancer screening  Colorectal Cancer Screening: ordered   Lung Cancer Screening: n/a - never smoker  Cervical Cancer Screening: n/a - s/p hysterectomy  Breast Cancer Screening: due 2024    Infectious disease screening/Immunizations  --STI Screening: declined   --Practices safe sex.  --HIV Screening: declined   --Hepatitis C Screening: completed - negative   --Immunizations:    Influenza: completed   HPV:  n/a    Tetanus: due     Shingles: completed   Pneumococcal: due    COVID-19: 2023 booster recommended   Other immunizations: n/a     She  has a past medical history of Anesthesia, Arthritis, Chest tightness, Cough, Diverticulosis (), Hypothyroid, Infectious disease, Lyme disease, Pain (2018), Painful breathing, and Shortness of breath.    Mariela Huff has no past medical history of Chest pain, Difficulty breathing, Fainting, Palpitations, Sputum production,  Swelling of lower extremity, or Wheezing.  She  has a past surgical history that includes hysterectomy, vaginal (1999); shoulder decompression arthroscopic (5/13/2009); rotator cuff repair (5/13/2009); other orthopedic surgery (1993); shoulder arthroscopy (12/1/2010); shoulder decompression (12/1/2010); rotator cuff repair (12/1/2010); breast biopsy (1/24/2012); pr breast augmentation with implant (Bilateral, 1999); us-needle core bx-breast panel; breast implant revision (Bilateral, 4/11/2016); bunionectomy (Right, 5/1/2017); neuroma excision (5/1/2017); toe fusion (Right, 5/7/2018); repair, tendon, lower extremity, using tendon graft (Right, 5/7/2018); hardware removal ortho (Right, 5/7/2018); orthopedic osteotomy (Right, 5/7/2018); pr shldr arthroscop,surg,w/rotat cuff repr (Right, 12/14/2022); pr repair biceps long tendon (Right, 12/14/2022); and pr shldr arthroscop,part acromioplas (Right, 12/14/2022).    Family History   Problem Relation Age of Onset    Cancer Paternal Aunt         breast    Diabetes Mother     Thyroid Mother     Diabetes Father     Thyroid Daughter     Thyroid Sister     Cancer Paternal Aunt         breast, uterine    Cancer Paternal Aunt         bladder    Cancer Paternal Aunt         colon    Heart Disease Neg Hx     Stroke Neg Hx     Alcohol/Drug Neg Hx        Social History     Socioeconomic History    Marital status:      Spouse name: Not on file    Number of children: Not on file    Years of education: Not on file    Highest education level: Bachelor's degree (e.g., BA, AB, BS)   Occupational History    Not on file   Tobacco Use    Smoking status: Never    Smokeless tobacco: Never   Vaping Use    Vaping Use: Never used   Substance and Sexual Activity    Alcohol use: Yes     Alcohol/week: 0.6 oz     Types: 1 Glasses of wine per week     Comment: Occasionally    Drug use: No    Sexual activity: Yes     Partners: Male   Other Topics Concern    Not on file   Social History Narrative     Not on file     Social Determinants of Health     Financial Resource Strain: Low Risk  (1/12/2021)    Overall Financial Resource Strain (CARDIA)     Difficulty of Paying Living Expenses: Not hard at all   Food Insecurity: No Food Insecurity (1/12/2021)    Hunger Vital Sign     Worried About Running Out of Food in the Last Year: Never true     Ran Out of Food in the Last Year: Never true   Transportation Needs: No Transportation Needs (1/12/2021)    PRAPARE - Transportation     Lack of Transportation (Medical): No     Lack of Transportation (Non-Medical): No   Physical Activity: Sufficiently Active (1/12/2021)    Exercise Vital Sign     Days of Exercise per Week: 5 days     Minutes of Exercise per Session: 50 min   Stress: No Stress Concern Present (1/12/2021)    [employer] [place] [employer] - Occupational Stress Questionnaire     Feeling of Stress : Not at all   Social Connections: Unknown (1/12/2021)    Social Connection and Isolation Panel [NHANES]     Frequency of Communication with Friends and Family: More than three times a week     Frequency of Social Gatherings with Friends and Family: Once a week     Attends Mosque Services: Patient refused     Active Member of Clubs or Organizations: Yes     Attends Club or Organization Meetings: More than 4 times per year     Marital Status:    Intimate Partner Violence: Not on file   Housing Stability: Low Risk  (1/12/2021)    Housing Stability Vital Sign     Unable to Pay for Housing in the Last Year: No     Number of Places Lived in the Last Year: 1     Unstable Housing in the Last Year: No       Patient Active Problem List    Diagnosis Date Noted    Dry mouth 09/15/2023    Headache 09/15/2023    Breast symptom 05/31/2023    Rotator cuff tear, right 12/14/2022    Traumatic tear of right rotator cuff, initial encounter 11/01/2022    Biceps tendonitis on right 11/01/2022    Subacromial impingement of right shoulder 11/01/2022    Dystonia of foot  12/14/2021    Parkinson's disease 06/11/2021    Dysphagia 04/05/2021    Arthralgia 02/28/2018    Hypothyroidism 01/24/2018         Current Outpatient Medications   Medication Sig Dispense Refill    carbidopa-levodopa (SINEMET)  MG Tab Take 1 Tablet by mouth every morning AND 0.5 Tablets 2 times a day. (Patient taking differently: Take 1 tab qAM + 0.5 at 10:30 am + 1 tab at 5 pm) 180 Tablet 2    levothyroxine (SYNTHROID) 75 MCG Tab 1 tab by mouth on an empty stomach 6 days per week. 72 Tablet 4    trihexyphenidyl (ARTANE) 2 MG Tab Take 1 Tablet by mouth 3 times a day. (Patient taking differently: Take 1 mg by mouth 3 times a day.) 270 Tablet 2    carbidopa-levodopa CR (SINEMET CR)  MG per tablet Take 1.5 Tablets by mouth 3 times a day AND 1 Tablet every evening. (Patient taking differently: Take 1 tablets by mouth qAM + 1.5 tab at noon + 1.5 tab at 8 pm) 495 Tablet 2    famotidine (PEPCID) 20 MG Tab TAKE 1 TABLET BY MOUTH TWICE A  tablet 0    Cyanocobalamin (B-12) 3000 MCG SL Tab Place 1 Tab under tongue.      Ascorbic Acid (VITAMIN C) 500 MG Chew Tab Take 500 mg by mouth every day.      Cholecalciferol (VITAMIN D) 50 MCG (2000 UT) Cap Take 1 Cap by mouth.      tizanidine (ZANAFLEX) 4 MG Tab Take 1 Tablet by mouth every 6 hours as needed (muscle spasm) for up to 30 days. (Patient not taking: Reported on 11/30/2023) 30 Tablet 3     No current facility-administered medications for this visit.     Allergies   Allergen Reactions    Pcn [Penicillins] Rash     Rash      Percocet [Oxycodone-Acetaminophen] Vomiting    Symbicort [Budesonide-Formoterol Fumarate]      Laryngitis        Review of Systems   Constitutional: Negative for fever, chills.   HENT: Negative for sore throat.    Eyes: Negative for pain.   Respiratory: Negative for cough.  Cardiovascular: Negative for leg swelling.   Gastrointestinal: Negative for nausea, vomiting.   Genitourinary: Negative for dysuria.   Skin: Negative for rash.    Neurological: Negative for dizziness.   Endo/Heme/Allergies: Does not bleed easily.   Psychiatric/Behavioral: Negative for depression.  The patient is not nervous/anxious.     Objective:     /62 (BP Location: Right arm, Patient Position: Sitting, BP Cuff Size: Adult)   Pulse 84   Temp 36.6 °C (97.9 °F) (Temporal)   Ht 1.524 m (5')   Wt 49.5 kg (109 lb 3.2 oz)   SpO2 96%   BMI 21.33 kg/m²   Body mass index is 21.33 kg/m².  Wt Readings from Last 4 Encounters:   11/30/23 49.5 kg (109 lb 3.2 oz)   11/16/23 49.5 kg (109 lb 2 oz)   09/15/23 49.4 kg (109 lb)   09/01/23 49.9 kg (110 lb 0.2 oz)       Physical Exam:  Constitutional: Well-developed and well-nourished. Not diaphoretic. No distress.   Skin: Skin is warm and dry. No rash noted.  Head: Atraumatic without lesions.  Eyes: Conjunctivae and extraocular motions are normal. Pupils are equal, round, and reactive to light. No scleral icterus.   Ears:  External ears unremarkable. Tympanic membranes clear and intact.  Mouth/Throat: Dentition is good. Tongue normal. Oropharynx is clear and moist. Posterior pharynx without erythema or exudates.  Neck: Supple, trachea midline. Normal range of motion. No thyromegaly present. No lymphadenopathy--cervical or supraclavicular.  Cardiovascular: Regular rate and rhythm, S1 and S2 without murmur, rubs, or gallops.  Lungs: Normal inspiratory effort, CTA bilaterally, no wheezes/rhonchi/rales  Abdomen: Soft, non tender, and without distention. Active bowel sounds in all four quadrants. No rebound, guarding, masses or HSM.  Extremities: No cyanosis, clubbing, erythema, nor edema. Distal pulses intact and symmetric.   Musculoskeletal: All major joints AROM full in all directions without pain.  Neurological: Alert and oriented x 3. DTRs 2+/3 and symmetric. No cranial nerve deficit. 5/5 myotomes. Sensation intact.   Psychiatric:  Behavior, mood, and affect are appropriate.    Assessment and Plan:     1. Wellness examination   Comp Metabolic Panel    Lipid Profile    CBC WITHOUT DIFFERENTIAL      2. Colon cancer screening  COLOGUARD (FIT DNA)           HCM:  Up to date   Labs per orders  Immunizations per orders  Patient counseled about skin care, diet, supplements, prenatal vitamins, safe sex and exercise.    Follow-up: Return in about 6 months (around 5/30/2024) for Med check.

## 2023-12-01 DIAGNOSIS — G20.A1 PARKINSON'S DISEASE WITHOUT DYSKINESIA OR FLUCTUATING MANIFESTATIONS (HCC): ICD-10-CM

## 2023-12-01 NOTE — TELEPHONE ENCOUNTER
Per patient- is  now taking the medication differently. Please advise. Thank you. BAYRON Colmenares.

## 2023-12-07 ENCOUNTER — HOSPITAL ENCOUNTER (OUTPATIENT)
Dept: LAB | Facility: MEDICAL CENTER | Age: 63
End: 2023-12-07
Attending: FAMILY MEDICINE
Payer: COMMERCIAL

## 2023-12-07 DIAGNOSIS — Z00.00 WELLNESS EXAMINATION: ICD-10-CM

## 2023-12-07 LAB
ALBUMIN SERPL BCP-MCNC: 4.3 G/DL (ref 3.2–4.9)
ALBUMIN/GLOB SERPL: 1.4 G/DL
ALP SERPL-CCNC: 92 U/L
ALT SERPL-CCNC: <5 U/L (ref 2–50)
ANION GAP SERPL CALC-SCNC: 9 MMOL/L (ref 7–16)
AST SERPL-CCNC: 11 U/L (ref 12–45)
BILIRUB SERPL-MCNC: 0.2 MG/DL (ref 0.1–1.5)
BUN SERPL-MCNC: 18 MG/DL (ref 8–22)
CALCIUM ALBUM COR SERPL-MCNC: 9.2 MG/DL (ref 8.5–10.5)
CALCIUM SERPL-MCNC: 9.4 MG/DL (ref 8.5–10.5)
CHLORIDE SERPL-SCNC: 106 MMOL/L (ref 96–112)
CHOLEST SERPL-MCNC: 171 MG/DL (ref 100–199)
CO2 SERPL-SCNC: 26 MMOL/L (ref 20–33)
CREAT SERPL-MCNC: 0.67 MG/DL (ref 0.5–1.4)
ERYTHROCYTE [DISTWIDTH] IN BLOOD BY AUTOMATED COUNT: 39.6 FL (ref 35.9–50)
FASTING STATUS PATIENT QL REPORTED: NORMAL
GFR SERPLBLD CREATININE-BSD FMLA CKD-EPI: 106 ML/MIN/1.73 M 2
GLOBULIN SER CALC-MCNC: 3 G/DL (ref 1.9–3.5)
GLUCOSE SERPL-MCNC: 91 MG/DL (ref 65–99)
HCT VFR BLD AUTO: 42.7 % (ref 42–52)
HDLC SERPL-MCNC: 92 MG/DL
HGB BLD-MCNC: 14.6 G/DL (ref 14–18)
LDLC SERPL CALC-MCNC: 71 MG/DL
MCH RBC QN AUTO: 32.2 PG (ref 27–33)
MCHC RBC AUTO-ENTMCNC: 34.2 G/DL (ref 32.3–36.5)
MCV RBC AUTO: 94.1 FL (ref 81.4–97.8)
PLATELET # BLD AUTO: 261 K/UL (ref 164–446)
PMV BLD AUTO: 9.7 FL (ref 9–12.9)
POTASSIUM SERPL-SCNC: 4.7 MMOL/L (ref 3.6–5.5)
PROT SERPL-MCNC: 7.3 G/DL (ref 6–8.2)
RBC # BLD AUTO: 4.54 M/UL (ref 4.7–6.1)
SODIUM SERPL-SCNC: 141 MMOL/L (ref 135–145)
TRIGL SERPL-MCNC: 39 MG/DL (ref 0–149)
WBC # BLD AUTO: 5 K/UL (ref 4.8–10.8)

## 2023-12-07 PROCEDURE — 80061 LIPID PANEL: CPT

## 2023-12-07 PROCEDURE — 80053 COMPREHEN METABOLIC PANEL: CPT

## 2023-12-07 PROCEDURE — 85027 COMPLETE CBC AUTOMATED: CPT

## 2023-12-07 PROCEDURE — 36415 COLL VENOUS BLD VENIPUNCTURE: CPT

## 2024-01-16 ENCOUNTER — OFFICE VISIT (OUTPATIENT)
Dept: NEUROLOGY | Facility: MEDICAL CENTER | Age: 64
End: 2024-01-16
Attending: INTERNAL MEDICINE
Payer: COMMERCIAL

## 2024-01-16 VITALS
HEART RATE: 89 BPM | DIASTOLIC BLOOD PRESSURE: 68 MMHG | SYSTOLIC BLOOD PRESSURE: 104 MMHG | WEIGHT: 110.45 LBS | OXYGEN SATURATION: 96 % | HEIGHT: 60 IN | TEMPERATURE: 97.4 F | BODY MASS INDEX: 21.68 KG/M2

## 2024-01-16 DIAGNOSIS — G20.A2 PARKINSON'S DISEASE WITHOUT DYSKINESIA, WITH FLUCTUATING MANIFESTATIONS (HCC): ICD-10-CM

## 2024-01-16 PROCEDURE — 3078F DIAST BP <80 MM HG: CPT | Performed by: INTERNAL MEDICINE

## 2024-01-16 PROCEDURE — 99215 OFFICE O/P EST HI 40 MIN: CPT | Performed by: INTERNAL MEDICINE

## 2024-01-16 PROCEDURE — 99212 OFFICE O/P EST SF 10 MIN: CPT | Performed by: INTERNAL MEDICINE

## 2024-01-16 PROCEDURE — 3074F SYST BP LT 130 MM HG: CPT | Performed by: INTERNAL MEDICINE

## 2024-01-16 ASSESSMENT — PATIENT HEALTH QUESTIONNAIRE - PHQ9: CLINICAL INTERPRETATION OF PHQ2 SCORE: 0

## 2024-01-16 ASSESSMENT — FIBROSIS 4 INDEX: FIB4 SCORE: 1.23

## 2024-01-16 NOTE — PATIENT INSTRUCTIONS
Parkinson's disease  Bilateral foot dystonia  - continue carbidopa/levodopa regimen  - continue trihexyphenydyl 2mg half-tab TID    Future options:  - Rasagiline: more mild, once daily  - Entacapone: 2-3 times daily, taken with levodopa  - Nourianz (istradefylline): once daily    Sinus drainage  - try OTC flonase/nasonex  - Zyrtec, Allergra, Claritin: less sedating or side effects  - Benadryl can also help but more sedating

## 2024-01-16 NOTE — PROGRESS NOTES
"[facility]  [address] [place] [address], [address]. [place] [address]  Phone: [phone] Patient Name: Mariela Huff  : 1960  MRN: [id number]     ASSESSMENT / PLAN       Mariela Huff is a 63 y.o. adult with Parkinson's disease    Parkinson's disease  Bilateral foot dystonia  Dopamine responsive foot dystonia, but having frequent wearing off and inconsistent benefit despite complex regimen.   LEDD 513mg  Tried rasagiline in past, but no longer taking. Future options:  - Rasagiline: more mild, once daily  - Entacapone: 2-3 times daily, taken with levodopa  - Nourianz (istradefylline): once daily    - continue carbidopa/levodopa regimen:  6AM, C/L IR 25/100 1 tab  730AM C/L CR 25/100 + half artane 2mg  10AM C/L IR half-tab  1230 C/L CR and half-artane  5PM C/L IR 1 tab  830PM C/L CR + half-artane  - continue trihexyphenydyl 2mg half-tab TID    Sinus drainage  - try OTC flonase/nasonex  - Zyrtec, Allergra, Claritin: less sedating or side effects  - Benadryl can also help    Tension headache  Palpable tenderness in mid suboccipital region, worse when supine. Onset after straining neck working on car. Better recently  - tizanidine 4mg PRN nightly + naproxen 200mg PRN nightly  - Google or on YouTube: \"Ayan and Jagdeep neck exercises\"    - Return in about 4 months (around 2024).    Pasquale Santiago DO  Neurology, Movement Disorders Specialist    BILLING DOCUMENTATION:   I spent 42 minutes reviewing the medical record, interviewing and examining the patient, discussing diagnosis and treatment, and coordinating care.          HISTORY OF PRESENT ILLNESS      Mariela Huff is a 63 y.o. adult with Parkinson's disease since 2017 with OFF toe curling dystonia s/p botox.      Initial HPI  Per Dr. Calvillo's note 2018:  [facility] confirm the presence of suspected Parkinson's disease. I reviewed the reports both from the examining physician as well as diagnostic testing. The latter " included Amelia testing showing asymmetric dopamine transporter activity in the putamen bilaterally, left greater than right, as well as neurophysiology movement evaluation with EMG and EEG correlate, revealing increased tone on the right with an action-induced tremor as well as resting tremor consistent with Parkinson's disease, without EEG correlate consistent with nonepileptic involuntary movements. Blood work including Lyme titer, heavy metal screen, vasculitis screens, etc., all were negative/normal.     [facility] 9/2021 Dr. Ajit Figueroa:   right foot cramping began in 2010, but more definitively she developed right hand rest tremor in 2016, with progression to development of micrographia, loss of dexterity R > L, and unsteadiness by 2018     3/2021 injection pattern:  Right flexor digitorum longus 22.5 units +++  Right flexor hallucis longus 15 units ++  Right flexor digitorum brevis 7.5 units N/a no EMG used here   Total: 45 units  Wasted: 55 units     9/2021 injection pattern:  Right flexor digitorum longus 30 units +++  Right flexor hallucis longus 20 units ++  Right posterior tibialis 20 ++  Right flexor digitorum brevis 15 units N/a no EMG used here   Total: 85 units  Wasted: 15 units   - reported minimal benefit jeane in morning when dystonia is worst.    Current regimen  6AM, C/L IR 25/100 1 tab  730AM C/L CR 25/100 + half artane 2mg  10AM C/L IR half-tab  1230 C/L CR and half-artane  5PM: C/L IR 1 tab  830PM C/L CR + half-artane    Latency: 45min  Duration: no clear fluctuations  Efficacy: helps with bilateral foot dystonia L>R.   Dyskinesia/Dystonia: dyskinesia with right foot twitching.   Sfx: New  of C/L IR pills causing more nausea. Taking with food helps.     Clinical summary  9/2023: Dr. Cam: MRI Brain and MRA Neck due to new onset headaches. No new changes on imaging.   11/16/23: first visit with me. Trial Rytary instead of C/L IR+CR but had nausea and brain fog, and incomplete  relief of dystonia.  1/2024: no med changes    ROS:  Orthostasis: no issues aside from rare occasions stretching   Constipation: no issues  Urinary issues: no issues  Speech/Swallow: no voice changes. Swallowing has to be more careful due to sinus drainage.  Cognition: steady  Mood: no issues  Hallucinations:   Sleep/RBD: headaches affecting sleep, cats wake her up. Sleeps 10PM - 6:30PM  Headaches  Tension type headache, u-shaped. Looking down into car.   Worse when supine. Takes tylenol and aleve at bedtime. Wakes up at night due to pain   Pain described at base of skull.   Visual changes at night: overlap in vision downwards even with glasses.   If skip medication, can worsen.       Past Medical History:   Diagnosis Date    Anesthesia     severe ponv, hard to wake up    Arthritis     shoulders    Chest tightness     Cough     Diverticulosis 2012    pt stated it was noted on colonoscopy    Hypothyroid     hypothyroid    Infectious disease     around bronchitis/flu 12/2011    Lyme disease     chronic    Pain 04/2018    right foot, bilateral shoulder    Painful breathing     Shortness of breath        Past Surgical History:   Procedure Laterality Date    PB SHLDR ARTHROSCOP,SURG,W/ROTAT CUFF REPB Right 12/14/2022    Procedure: RIGHT SHOULDER ARTHROSCOPY ROTATOR CUFF REPAIR;  Surgeon: Mildred Hu M.D.;  Location: [facility];  Service: Orthopedics    PB REPAIR BICEPS LONG TENDON Right 12/14/2022    Procedure: RIGHT BICEPS TENODESIS;  Surgeon: Mildred Hu M.D.;  Location: [facility];  Service: Orthopedics    DC SHLDR ARTHROSCOP,PART ACROMIOPLAS Right 12/14/2022    Procedure: RIGHT SUBACROMIAL DECOMPRESSION, LABRAL DEBRIDEMENT, REPAIRS AS INDICATED;  Surgeon: Mildred Hu M.D.;  Location: [facility];  Service: Orthopedics    TOE FUSION Right 5/7/2018    Procedure: TOE FUSION - 1ST MTP;  Surgeon: Janes Benavidez M.D.;  Location: SURGERY  SAME DAY [facility];  Service: Orthopedics    REPAIR, TENDON, LOWER EXTREMITY, USING TENDON GRAFT Right 5/7/2018    Procedure: FLEXOR TENDON REPAIR - 4TH RELEASE W/SOFT TISSUE RELEASE 2/3;  Surgeon: Janes Benavidez M.D.;  Location: SURGERY SAME DAY [facility];  Service: Orthopedics    HARDWARE REMOVAL ORTHO Right 5/7/2018    Procedure: HARDWARE REMOVAL ORTHO - INTERNAL FIXATION;  Surgeon: Janes Benavidez M.D.;  Location: SURGERY SAME DAY [facility];  Service: Orthopedics    ORTHOPEDIC OSTEOTOMY Right 5/7/2018    Procedure: ORTHOPEDIC OSTEOTOMY - DISTAL METATARSAL 2/3/4;  Surgeon: Janes Benavidez M.D.;  Location: SURGERY SAME DAY [facility];  Service: Orthopedics    BUNIONECTOMY Right 5/1/2017    Procedure: BUNIONECTOMY - PROXIMAL HALLUX VALGUS CORRECTION W/BONE GRAFT;  Surgeon: Janes Benavidez M.D.;  Location: SURGERY [facility];  Service:     NEUROMA EXCISION  5/1/2017    Procedure: NEUROMA EXCISION - 2ND WEBSPACE;  Surgeon: Janes Benavidez M.D.;  Location: SURGERY [facility];  Service:     BREAST IMPLANT REVISION Bilateral 4/11/2016    Procedure: BREAST IMPLANT EXCHANGE OF SALINE TO SILICONE W/REPOSITIONING OF LATERAL FOLDS;  Surgeon: Mikey Adkins M.D.;  Location: [facility];  Service:     BREAST BIOPSY  1/24/2012    Performed by SANDRA ESTRADA at SURGERY SAME DAY [facility]    SHOULDER ARTHROSCOPY  12/1/2010    Performed by KATE CHANCE at SURGERY [facility]    SHOULDER DECOMPRESSION  12/1/2010    Performed by KATE CHANCE at SURGERY [facility]    ROTATOR CUFF REPAIR  12/1/2010    Performed by KATE CHANCE at SURGERY [facility]    SHOULDER DECOMPRESSION ARTHROSCOPIC  5/13/2009    Performed by KATE CHANCE at SURGERY [facility]    ROTATOR CUFF REPAIR  5/13/2009    Performed by KATE CHANCE at SURGERY [facility]    HYSTERECTOMY, VAGINAL  1999    fibroids    ND BREAST AUGMENTATION WITH IMPLANT Bilateral 1999    OTHER ORTHOPEDIC  SURGERY  1993    joint repair left thumb    US-NEEDLE CORE BX-BREAST PANEL         Family History   Problem Relation Age of Onset    Cancer Paternal Aunt         breast    Diabetes Mother     Thyroid Mother     Diabetes Father     Thyroid Daughter     Thyroid Sister     Cancer Paternal Aunt         breast, uterine    Cancer Paternal Aunt         bladder    Cancer Paternal Aunt         colon    Heart Disease Neg Hx     Stroke Neg Hx     Alcohol/Drug Neg Hx        Social History     Socioeconomic History    Marital status:      Spouse name: Not on file    Number of children: Not on file    Years of education: Not on file    Highest education level: Bachelor's degree (e.g., BA, AB, BS)   Occupational History    Not on file   Tobacco Use    Smoking status: Never    Smokeless tobacco: Never   Vaping Use    Vaping Use: Never used   Substance and Sexual Activity    Alcohol use: Yes     Alcohol/week: 0.6 oz     Types: 1 Glasses of wine per week     Comment: Occasionally    Drug use: No    Sexual activity: Yes     Partners: Male   Other Topics Concern    Not on file   Social History Narrative    Not on file     Social Determinants of Health     Financial Resource Strain: Low Risk  (1/12/2021)    Overall Financial Resource Strain (CARDIA)     Difficulty of Paying Living Expenses: Not hard at all   Food Insecurity: No Food Insecurity (1/12/2021)    Hunger Vital Sign     Worried About Running Out of Food in the Last Year: Never true     Ran Out of Food in the Last Year: Never true   Transportation Needs: No Transportation Needs (1/12/2021)    PRAPARE - Transportation     Lack of Transportation (Medical): No     Lack of Transportation (Non-Medical): No   Physical Activity: Sufficiently Active (1/12/2021)    Exercise Vital Sign     Days of Exercise per Week: 5 days     Minutes of Exercise per Session: 50 min   Stress: No Stress Concern Present (1/12/2021)    [employer] - Occupational Stress  Questionnaire     Feeling of Stress : Not at all   Social Connections: Unknown (1/12/2021)    Social Connection and Isolation Panel [NHANES]     Frequency of Communication with Friends and Family: More than three times a week     Frequency of Social Gatherings with Friends and Family: Once a week     Attends Bahai Services: Patient refused     Active Member of Clubs or Organizations: Yes     Attends Club or Organization Meetings: More than 4 times per year     Marital Status:    Intimate Partner Violence: Not on file   Housing Stability: Low Risk  (1/12/2021)    Housing Stability Vital Sign     Unable to Pay for Housing in the Last Year: No     Number of Places Lived in the Last Year: 1     Unstable Housing in the Last Year: No       Current Outpatient Medications   Medication    carbidopa-levodopa (SINEMET)  MG Tab    carbidopa-levodopa CR (SINEMET CR)  MG per tablet    levothyroxine (SYNTHROID) 75 MCG Tab    trihexyphenidyl (ARTANE) 2 MG Tab    famotidine (PEPCID) 20 MG Tab    Cyanocobalamin (B-12) 3000 MCG SL Tab    Ascorbic Acid (VITAMIN C) 500 MG Chew Tab    Cholecalciferol (VITAMIN D) 50 MCG (2000 UT) Cap     No current facility-administered medications for this visit.       Allergies   Allergen Reactions    Pcn [Penicillins] Rash     Rash      Percocet [Oxycodone-Acetaminophen] Vomiting    Symbicort [Budesonide-Formoterol Fumarate]      Laryngitis          OBJECTIVE      Vitals:    01/16/24 1024   BP: 104/68   BP Location: Right arm   Patient Position: Sitting   BP Cuff Size: Adult   Pulse: 89   Temp: 36.3 °C (97.4 °F)   TempSrc: Temporal   SpO2: 96%   Weight: 50.1 kg (110 lb 7.2 oz)   Height: 1.524 m (5')       Physical Exam     Last dose of C/L taken 10:30 (1 hour ago)     General: NAD, appears stated age.      Mental status: speech clear and fluent. Fund of knowledge is good.      Abnormal movements:    UPDRS Right Left   Finger tapping 0 better 0   Hand Movement 0 better 0   Toe  Tapping 0 0   Leg Agility 0 0   Rigidity 1 better 1   Rest Tremor 0 0   Postural Tremor 1 1   Kinetic Tremor 1 1      No dystonia, dyskinesias, tics, stereotypies, athetosis, akathisia, or chorea noted.      Cerebellar: No dysmetria with FTN or heel-to-shin.    Gait:   Posture - normal   Base - narrow   Stride length - normal   Arm swing - normal   Speed - normal  Shuffling/freezing - none  U-Turn - normal       DATA / RESULTS      9/2023:  MRI Brain  Unremarkable noncontrast MR examination of the brain except for a punctate left frontal subcortical T2 hyperintensity of no clinical significance.     MRA Neck  There is no flow-limiting stenosis in the visualized extracranial neck arteries.    PROCEDURE     N/A

## 2024-05-29 ENCOUNTER — OFFICE VISIT (OUTPATIENT)
Dept: NEUROLOGY | Facility: MEDICAL CENTER | Age: 64
End: 2024-05-29
Attending: INTERNAL MEDICINE
Payer: COMMERCIAL

## 2024-05-29 VITALS
RESPIRATION RATE: 20 BRPM | SYSTOLIC BLOOD PRESSURE: 112 MMHG | OXYGEN SATURATION: 97 % | TEMPERATURE: 97.8 F | HEART RATE: 81 BPM | DIASTOLIC BLOOD PRESSURE: 70 MMHG | BODY MASS INDEX: 21.99 KG/M2 | WEIGHT: 111.99 LBS | HEIGHT: 60 IN

## 2024-05-29 DIAGNOSIS — M25.552 LEFT HIP PAIN: ICD-10-CM

## 2024-05-29 DIAGNOSIS — G20.A2 PARKINSON'S DISEASE WITHOUT DYSKINESIA, WITH FLUCTUATING MANIFESTATIONS (HCC): ICD-10-CM

## 2024-05-29 DIAGNOSIS — H53.2 VERTICAL DIPLOPIA: Primary | ICD-10-CM

## 2024-05-29 ASSESSMENT — FIBROSIS 4 INDEX: FIB4 SCORE: 1.23

## 2024-05-29 NOTE — PROGRESS NOTES
[facility]  [address] [place] [address], [address]. [place] [address]  Phone: [phone] Patient Name: Mariela Huff  : 1960  MRN: [id number]     ASSESSMENT / PLAN       Mariela Huff is a 63 y.o. adult with Parkinson's disease    Parkinson's disease  Bilateral foot dystonia  Dopamine responsive foot dystonia, but having frequent wearing off and inconsistent benefit despite complex regimen. LEDD 513mg    Continue  - carbidopa/levodopa 25/100 and trihexyphenydyl 2mg as follows:  6:30 AM C/L 25/100, 1 tab   7:30 AM Mucuna (400mg, ~60mg L-dopa) + trihexyphenidyl 2mg half-tablet   10:30 AM  C/L IR half-tab + mucuna   1:30 PM  C/L IR half-tab + mucuna + trihexyphenidyl 2mg half-tablet   5 PM C/L IR 1 tab   8:30 PM  Mucuna + trihexyphenidyl 2mg half-tablet     Discussed   - Nourianz (istradefylline): once daily  - Inbrijia inhalation levodopa    Left hip pain  Possible compensation of left foot dystonia leading to hip pain as it worsens as the day progresses  - physical therapy referral    Sinus drainage  - try OTC flonase/nasonex  - Zyrtec, Allergra, Claritin: less sedating or side effects  - Benadryl can also help    Tension headache  Palpable tenderness in mid suboccipital region, worse when supine. Onset after straining neck working on car. Better recently.  - tizanidine 4mg PRN nightly + naproxen 200mg PRN nightly    Orders Placed This Encounter    Referral to Physical Therapy    Referral to Neuro Ophthalmology     - Return in about 5 months (around 10/29/2024).    Pasquale Santiago DO  Neurology, Movement Disorders    BILLING DOCUMENTATION:   I spent 49 minutes reviewing the medical record, interviewing and examining the patient, discussing diagnosis and treatment, and coordinating care.          HISTORY OF PRESENT ILLNESS      Mariela Huff is a 63 y.o. adult with Parkinson's disease since 2017 with OFF toe curling dystonia s/p botox.      Initial HPI  Per Dr. Calvillo's  note 5/2018:  [facility] confirm the presence of suspected Parkinson's disease. I reviewed the reports both from the examining physician as well as diagnostic testing. The latter included Amelia testing showing asymmetric dopamine transporter activity in the putamen bilaterally, left greater than right, as well as neurophysiology movement evaluation with EMG and EEG correlate, revealing increased tone on the right with an action-induced tremor as well as resting tremor consistent with Parkinson's disease, without EEG correlate consistent with nonepileptic involuntary movements. Blood work including Lyme titer, heavy metal screen, vasculitis screens, etc., all were negative/normal.     [facility] 9/2021 Dr. Ajit Figueroa:   right foot cramping began in 2010, but more definitively she developed right hand rest tremor in 2016, with progression to development of micrographia, loss of dexterity R > L, and unsteadiness by 2018     3/2021 injection pattern:  Right flexor digitorum longus 22.5 units +++  Right flexor hallucis longus 15 units ++  Right flexor digitorum brevis 7.5 units N/a no EMG used here   Total: 45 units  Wasted: 55 units     9/2021 injection pattern:  Right flexor digitorum longus 30 units +++  Right flexor hallucis longus 20 units ++  Right posterior tibialis 20 ++  Right flexor digitorum brevis 15 units N/a no EMG used here   Total: 85 units  Wasted: 15 units   - reported minimal benefit jeane in morning when dystonia is worst.    Current regimen  6:30 AM C/L 25/100, 1 tab   7:30 AM Mucuna (400mg, ~60mg L-dopa) + trihexyphenidyl 2mg half-tablet   10:30 AM  C/L IR half-tab + mucuna   1:30 PM  C/L IR half-tab + mucuna + trihexyphenidyl 2mg half-tablet   5 PM C/L IR 1 tab   8:30 PM  Mucuna + trihexyphenidyl 2mg half-tablet   Latency: 45min  Duration: sometimes gait issues mid-day, wrosening between 1030-130  Efficacy: helps with bilateral foot dystonia L>R.   Dyskinesia/Dystonia: dyskinesia with right foot  twitching  Dystonia: morning worse, better as day goes on.  Sfx: none    Clinical summary  9/2023: Dr. Cam: MRI Brain and MRA Neck due to new onset headaches. No new changes on imaging.   11/16/23: first visit with me. Trial Rytary instead of C/L IR+CR but had nausea and brain fog, and incomplete relief of dystonia.  1/2024: stopped C/L CR due to nausea with  change. Started mucuna pruriens (400mg, ~60mg L-dopa)  05/29/24: no med change    ROS:  Orthostasis: no issues aside from rare occasions stretching   Constipation: no issues  Urinary issues: no issues  Speech/Swallow: no voice changes. Swallowing has to be more careful due to sinus drainage.  Cognition: steady  Mood: no issues  Hallucinations: no issues  Vision change seeing vertical diplopia, jeane with lights when driving  Sleep/RBD: headaches affecting sleep, cats wake her up. Sleeps 10PM - 6:30PM  Stiff and achy at night. Aleve + tylenol helps.   Headaches:  Tension type headache, u-shaped. Pain described at base of skull. Worse when supine. Takes tylenol and aleve at bedtime. Wakes up at night due to pain. If skip medication, can worsen.   Better now, levodopa helps.      Past Medical History:   Diagnosis Date    Anesthesia     severe ponv, hard to wake up    Arthritis     shoulders    Chest tightness     Cough     Diverticulosis 2012    pt stated it was noted on colonoscopy    Hypothyroid     hypothyroid    Infectious disease     around bronchitis/flu 12/2011    Lyme disease     chronic    Pain 04/2018    right foot, bilateral shoulder    Painful breathing     Shortness of breath        Past Surgical History:   Procedure Laterality Date    PB SHLDR ARTHROSCOP,SURG,W/ROTAT CUFF REPB Right 12/14/2022    Procedure: RIGHT SHOULDER ARTHROSCOPY ROTATOR CUFF REPAIR;  Surgeon: Mildred Hu M.D.;  Location: [facility];  Service: Orthopedics    PB REPAIR BICEPS LONG TENDON Right 12/14/2022    Procedure: RIGHT BICEPS TENODESIS;   Surgeon: Mildred Hu M.D.;  Location: [facility];  Service: Orthopedics    OH SHLDR ARTHROSCOP,PART ACROMIOPLAS Right 12/14/2022    Procedure: RIGHT SUBACROMIAL DECOMPRESSION, LABRAL DEBRIDEMENT, REPAIRS AS INDICATED;  Surgeon: Mildred Hu M.D.;  Location: [facility];  Service: Orthopedics    TOE FUSION Right 5/7/2018    Procedure: TOE FUSION - 1ST MTP;  Surgeon: Janes Benavidez M.D.;  Location: SURGERY SAME DAY [facility];  Service: Orthopedics    REPAIR, TENDON, LOWER EXTREMITY, USING TENDON GRAFT Right 5/7/2018    Procedure: FLEXOR TENDON REPAIR - 4TH RELEASE W/SOFT TISSUE RELEASE 2/3;  Surgeon: Janes Benavidez M.D.;  Location: SURGERY SAME DAY [facility];  Service: Orthopedics    HARDWARE REMOVAL ORTHO Right 5/7/2018    Procedure: HARDWARE REMOVAL ORTHO - INTERNAL FIXATION;  Surgeon: Janes Benavidez M.D.;  Location: SURGERY SAME DAY [facility];  Service: Orthopedics    ORTHOPEDIC OSTEOTOMY Right 5/7/2018    Procedure: ORTHOPEDIC OSTEOTOMY - DISTAL METATARSAL 2/3/4;  Surgeon: Janes Benavidez M.D.;  Location: SURGERY SAME DAY [facility];  Service: Orthopedics    BUNIONECTOMY Right 5/1/2017    Procedure: BUNIONECTOMY - PROXIMAL HALLUX VALGUS CORRECTION W/BONE GRAFT;  Surgeon: Janes Benavidez M.D.;  Location: [facility];  Service:     NEUROMA EXCISION  5/1/2017    Procedure: NEUROMA EXCISION - 2ND WEBSPACE;  Surgeon: Janes Benavidez M.D.;  Location: SURGERY [facility];  Service:     BREAST IMPLANT REVISION Bilateral 4/11/2016    Procedure: BREAST IMPLANT EXCHANGE OF SALINE TO SILICONE W/REPOSITIONING OF LATERAL FOLDS;  Surgeon: Mikey Adkins M.D.;  Location: SURGERY [facility];  Service:     BREAST BIOPSY  1/24/2012    Performed by SANDRA ESTRADA at SURGERY SAME DAY [facility]    SHOULDER ARTHROSCOPY  12/1/2010    Performed by KATE CHANCE at SURGERY [facility]    SHOULDER DECOMPRESSION  12/1/2010     Performed by KATE CHANCE at SURGERY [facility]S    ROTATOR CUFF REPAIR  12/1/2010    Performed by KATE CHANCE at SURGERY [facility]    SHOULDER DECOMPRESSION ARTHROSCOPIC  5/13/2009    Performed by KATE CHANCE at SURGERY [facility] OR[facility]    ROTATOR CUFF REPAIR  5/13/2009    Performed by KATE CHANCE at SURGERY [facility] ORS    HYSTERECTOMY, VAGINAL  1999    fibroids    ND BREAST AUGMENTATION WITH IMPLANT Bilateral 1999    OTHER ORTHOPEDIC SURGERY  1993    joint repair left thumb    US-NEEDLE CORE BX-BREAST PANEL         Family History   Problem Relation Age of Onset    Cancer Paternal Aunt         breast    Diabetes Mother     Thyroid Mother     Diabetes Father     Thyroid Daughter     Thyroid Sister     Cancer Paternal Aunt         breast, uterine    Cancer Paternal Aunt         bladder    Cancer Paternal Aunt         colon    Heart Disease Neg Hx     Stroke Neg Hx     Alcohol/Drug Neg Hx        Social History     Socioeconomic History    Marital status:      Spouse name: Not on file    Number of children: Not on file    Years of education: Not on file    Highest education level: Bachelor's degree (e.g., BA, AB, BS)   Occupational History    Not on file   Tobacco Use    Smoking status: Never    Smokeless tobacco: Never   Vaping Use    Vaping status: Never Used   Substance and Sexual Activity    Alcohol use: Yes     Alcohol/week: 0.6 oz     Types: 1 Glasses of wine per week     Comment: Occasionally    Drug use: No    Sexual activity: Yes     Partners: Male   Other Topics Concern    Not on file   Social History Narrative    Not on file     Social Determinants of Health     Financial Resource Strain: Low Risk  (1/12/2021)    Overall Financial Resource Strain (CARDIA)     Difficulty of Paying Living Expenses: Not hard at all   Food Insecurity: No Food Insecurity (1/12/2021)    Hunger Vital Sign     Worried About Running Out of Food in the Last Year: Never true     Ran Out of Food in the  Last Year: Never true   Transportation Needs: No Transportation Needs (1/12/2021)    PRAPARE - Transportation     Lack of Transportation (Medical): No     Lack of Transportation (Non-Medical): No   Physical Activity: Sufficiently Active (1/12/2021)    Exercise Vital Sign     Days of Exercise per Week: 5 days     Minutes of Exercise per Session: 50 min   Stress: No Stress Concern Present (1/12/2021)    [employer] - Occupational Stress Questionnaire     Feeling of Stress : Not at all   Social Connections: Unknown (1/12/2021)    Social Connection and Isolation Panel [NHANES]     Frequency of Communication with Friends and Family: More than three times a week     Frequency of Social Gatherings with Friends and Family: Once a week     Attends [employer] Services: Patient declined     Active Member of Clubs or Organizations: Yes     Attends Club or Organization Meetings: More than 4 times per year     Marital Status:    Intimate Partner Violence: Not on file   Housing Stability: Low Risk  (1/12/2021)    Housing Stability Vital Sign     Unable to Pay for Housing in the Last Year: No     Number of Places Lived in the Last Year: 1     Unstable Housing in the Last Year: No       Current Outpatient Medications   Medication    carbidopa-levodopa (SINEMET)  MG Tab    levothyroxine (SYNTHROID) 75 MCG Tab    trihexyphenidyl (ARTANE) 2 MG Tab    famotidine (PEPCID) 20 MG Tab    Cyanocobalamin (B-12) 3000 MCG SL Tab    Ascorbic Acid (VITAMIN C) 500 MG Chew Tab    Cholecalciferol (VITAMIN D) 50 MCG (2000 UT) Cap     No current facility-administered medications for this visit.       Allergies   Allergen Reactions    Pcn [Penicillins] Rash     Rash      Percocet [Oxycodone-Acetaminophen] Vomiting    Symbicort [Budesonide-Formoterol Fumarate]      Laryngitis        DATA / RESULTS      25-Hydroxy   Vitamin D 25   Date Value Ref Range Status   03/19/2018 22 (L) 30 - 100 ng/mL Final     Comment:      Adult Ranges:   <20 ng/mL - Deficiency  20-29 ng/mL - Insufficiency   ng/mL - Sufficiency  The [employer] Vitamin D Assay is standardized to the  [employer] reference measurement procedures, a  reference method for the Vitamin D Standardization Program  (VDSP).  The VDSP aligns patient results among 25 (OH)  Vitamin D methods.       TSH   Date Value Ref Range Status   07/19/2023 1.780 0.380 - 5.330 uIU/mL Final     Comment:     The 2011 American Thyroid Association (BRIGITTE) guidelines  recommended that the interpretation of thyroid function in  pregnancy be based on trimester specific reference ranges.    1st Trimester  0.100-2.500 mIU/L  2nd Trimester  0.200-3.000 mIU/L  3rd Trimester  0.300-3.500 mIU/L    These established reference ranges have not been validated  at [employer].       HIV 1/0/2   Date Value Ref Range Status   11/24/2010 see below Non Reactive Final     Comment:     Non Reactive:  Screen for Enhanced HIV 1/O/2 is NEGATIVE for  HIV-1, including group O, and HIV-2 antibodies.  A negative  test result at any point in the investigation of individual  subjects does not preclude the possibility of exposure to or  infection with HIV 1/O/2.     LDL   Date Value Ref Range Status   12/07/2023 71 <100 mg/dL Final        9/2023:  MRI Brain  Unremarkable noncontrast MR examination of the brain except for a punctate left frontal subcortical T2 hyperintensity of no clinical significance.     MRA Neck  There is no flow-limiting stenosis in the visualized extracranial neck arteries.    OBJECTIVE      Vitals:    05/29/24 1033   BP: 112/70   BP Location: Left arm   Patient Position: Sitting   BP Cuff Size: Adult   Pulse: 81   Resp: 20   Temp: 36.6 °C (97.8 °F)   TempSrc: Temporal   SpO2: 97%   Weight: 50.8 kg (111 lb 15.9 oz)   Height: 1.524 m (5')     Physical Exam     Last dose of C/L taken 10:30 (1 hour ago)     General: NAD, appears stated age.      Mental status: speech clear and  fluent. Fund of knowledge is good.      Abnormal movements:    UPDRS Right Left   Finger tapping 0 better 0   Hand Movement 0 better 0   Toe Tapping 0 0   Leg Agility 0 0   Rigidity 1 better 1   Rest Tremor 0 0   Postural Tremor 1 1   Kinetic Tremor 1 1      No dystonia, dyskinesias, tics, stereotypies, athetosis, akathisia, or chorea noted.      Cerebellar: No dysmetria with FTN or heel-to-shin.    Gait:   Posture - normal   Base - narrow   Stride length - normal   Arm swing - normal   Speed - normal  Shuffling/freezing - none  U-Turn - normal     PROCEDURE     N/A

## 2024-05-29 NOTE — PATIENT INSTRUCTIONS
Let me know about medication:  - Nourianz (istradefylline): once daily  - Inbrijia inhalation levodopa    PT at [facility]:  Selena Ray    Neuro-ophthalmology referral

## 2024-05-30 ENCOUNTER — OFFICE VISIT (OUTPATIENT)
Dept: MEDICAL GROUP | Facility: PHYSICIAN GROUP | Age: 64
End: 2024-05-30
Payer: COMMERCIAL

## 2024-05-30 ENCOUNTER — HOSPITAL ENCOUNTER (OUTPATIENT)
Dept: LAB | Facility: MEDICAL CENTER | Age: 64
End: 2024-05-30
Attending: FAMILY MEDICINE
Payer: COMMERCIAL

## 2024-05-30 VITALS
SYSTOLIC BLOOD PRESSURE: 104 MMHG | HEIGHT: 60 IN | DIASTOLIC BLOOD PRESSURE: 60 MMHG | BODY MASS INDEX: 21.95 KG/M2 | WEIGHT: 111.8 LBS | OXYGEN SATURATION: 97 % | TEMPERATURE: 98 F | HEART RATE: 78 BPM

## 2024-05-30 DIAGNOSIS — M79.601 PAIN IN BOTH UPPER EXTREMITIES: ICD-10-CM

## 2024-05-30 DIAGNOSIS — M79.602 PAIN IN BOTH UPPER EXTREMITIES: ICD-10-CM

## 2024-05-30 DIAGNOSIS — E03.9 HYPOTHYROIDISM, UNSPECIFIED TYPE: ICD-10-CM

## 2024-05-30 PROBLEM — S46.011A TRAUMATIC TEAR OF RIGHT ROTATOR CUFF, INITIAL ENCOUNTER: Status: RESOLVED | Noted: 2022-11-01 | Resolved: 2024-05-30

## 2024-05-30 PROBLEM — M75.101 ROTATOR CUFF TEAR, RIGHT: Status: RESOLVED | Noted: 2022-12-14 | Resolved: 2024-05-30

## 2024-05-30 PROBLEM — M75.41 SUBACROMIAL IMPINGEMENT OF RIGHT SHOULDER: Status: RESOLVED | Noted: 2022-11-01 | Resolved: 2024-05-30

## 2024-05-30 LAB — TSH SERPL DL<=0.005 MIU/L-ACNC: 2.69 UIU/ML (ref 0.38–5.33)

## 2024-05-30 ASSESSMENT — FIBROSIS 4 INDEX: FIB4 SCORE: 1.23

## 2024-05-30 NOTE — PROGRESS NOTES
Verbal consent was acquired by the patient to use [employer] ambient listening note generation during this visit     Subjective:     HPI:   History of Present Illness  The patient is a 63-year-old woman.    The patient's thyroid medication dosage was reduced to 6 days per week from 5 days per week in 05/2023. She has been experiencing a gradual weight gain over the past few months. She denies experiencing constipation or feeling cold in her room. Her skin has been slightly dry. She takes her Sinemet first thing in the morning to prevent foot cramps and is unable to get out of bed. She does take it with the levothyroxine. She also takes vitamin C and Artane.     She underwent an acromioplasty and biceps long tendon repair with a rotator cuff repair in 12/2022 on her right side. Recently, during a boxing class, she was throwing a 8 to 10-pound ball back and forth to each other and performing squats without discomfort. However, on a Friday night, she began to experience lower back pain. A week later, she began experiencing bilateral biceps pain, which disrupts her sleep. During the day, her biceps feels sore and tight, but it does not limit her activities. She has attended the class since Thursday, but did not perform the same activities.     She denies experiencing chest pain, difficulty breathing, fevers, or chills.     She has not received the COVID-19 booster. She has completed the [employer].    Supplemental Information  She saw her neurologist yesterday.    Health Maintenance: Completed    Objective:     Exam:  /60 (BP Location: Right arm, Patient Position: Sitting, BP Cuff Size: Adult)   Pulse 78   Temp 36.7 °C (98 °F) (Temporal)   Ht 1.524 m (5')   Wt 50.7 kg (111 lb 12.8 oz)   SpO2 97%   BMI 21.83 kg/m²  Body mass index is 21.83 kg/m².    Physical Exam  Constitutional:       Appearance: Normal appearance.   Cardiovascular:      Rate and Rhythm: Normal rate and regular rhythm.      Heart sounds:  Normal heart sounds.   Pulmonary:      Effort: Pulmonary effort is normal.      Breath sounds: Normal breath sounds.   Musculoskeletal:      Cervical back: Normal range of motion and neck supple.      Comments: R Shoulder: Full ROM, TTP none, full strength, empty can test negative, drop arm test negative     Lymphadenopathy:      Cervical: No cervical adenopathy.   Neurological:      Mental Status: She is alert.             Results      Assessment & Plan:     1. Hypothyroidism, unspecified type  TSH WITH REFLEX TO FT4      2. Pain in both upper extremities            Assessment & Plan  1. Hypothyroidism.  The patient's hypothyroidism is chronic in nature and remains uncertain. In 05/2023, the dosage was reduced to 75 mcg 6 days per week. Labs from 07/2023 were deemed appropriate. However, she has observed a steady weight gain and dry skin over the past few months. Therefore, a recheck of her TSH will be conducted to determine if further adjustments are necessary. Concurrently, she will maintain her current regimen of levothyroxine 75 mcg 6 days per week.    2. Pain in both upper and lower extremities.  The patient's pain in both upper and lower extremities was acute. A week ago, during a boxing class, she engaged in catching and tossing 8 to 10-pound balls. Since then, she has noticed pain over the bicep muscles. She is slightly concerned as she had a biceps tendinitis repair done in 12/2022, and she is concerned that the repair has been compromised. Her shoulder examination today showed stable rotator cuff muscles without any tenderness to palpation over the biceps muscle itself bilaterally. It is suspected that she has a mild biceps strain from that particular exercise. Time and rest should improve on its own.    Referral for genetic research was offered. Patient is a participant.    Return in about 6 months (around 11/30/2024) for Annual/wellness visit.    Please note that this dictation was created using voice  recognition software. I have made every reasonable attempt to correct obvious errors, but I expect that there are errors of grammar and possibly content that I did not discover before finalizing the note.

## 2024-08-07 DIAGNOSIS — G20.A1 PARKINSON'S DISEASE WITHOUT DYSKINESIA OR FLUCTUATING MANIFESTATIONS (HCC): ICD-10-CM

## 2024-08-08 ENCOUNTER — APPOINTMENT (RX ONLY)
Dept: URBAN - METROPOLITAN AREA CLINIC 6 | Facility: CLINIC | Age: 64
Setting detail: DERMATOLOGY
End: 2024-08-08

## 2024-08-08 DIAGNOSIS — L98.8 OTHER SPECIFIED DISORDERS OF THE SKIN AND SUBCUTANEOUS TISSUE: ICD-10-CM

## 2024-08-08 DIAGNOSIS — Z71.89 OTHER SPECIFIED COUNSELING: ICD-10-CM

## 2024-08-08 DIAGNOSIS — D22 MELANOCYTIC NEVI: ICD-10-CM

## 2024-08-08 DIAGNOSIS — L82.1 OTHER SEBORRHEIC KERATOSIS: ICD-10-CM

## 2024-08-08 DIAGNOSIS — L81.4 OTHER MELANIN HYPERPIGMENTATION: ICD-10-CM

## 2024-08-08 DIAGNOSIS — D18.0 HEMANGIOMA: ICD-10-CM

## 2024-08-08 DIAGNOSIS — L84 CORNS AND CALLOSITIES: ICD-10-CM

## 2024-08-08 PROBLEM — D22.5 MELANOCYTIC NEVI OF TRUNK: Status: ACTIVE | Noted: 2024-08-08

## 2024-08-08 PROBLEM — D22.39 MELANOCYTIC NEVI OF OTHER PARTS OF FACE: Status: ACTIVE | Noted: 2024-08-08

## 2024-08-08 PROBLEM — D18.01 HEMANGIOMA OF SKIN AND SUBCUTANEOUS TISSUE: Status: ACTIVE | Noted: 2024-08-08

## 2024-08-08 PROCEDURE — 99396 PREV VISIT EST AGE 40-64: CPT

## 2024-08-08 PROCEDURE — ? COUNSELING

## 2024-08-08 RX ORDER — CARBIDOPA AND LEVODOPA 25; 100 MG/1; MG/1
1 TABLET ORAL 3 TIMES DAILY
Qty: 270 TABLET | Refills: 3 | Status: SHIPPED | OUTPATIENT
Start: 2024-08-08 | End: 2025-08-03

## 2024-08-08 ASSESSMENT — LOCATION ZONE DERM
LOCATION ZONE: TRUNK
LOCATION ZONE: FACE
LOCATION ZONE: LIP
LOCATION ZONE: FINGER
LOCATION ZONE: HAND

## 2024-08-08 ASSESSMENT — LOCATION SIMPLE DESCRIPTION DERM
LOCATION SIMPLE: RIGHT HAND
LOCATION SIMPLE: ABDOMEN
LOCATION SIMPLE: RIGHT LIP
LOCATION SIMPLE: CHIN
LOCATION SIMPLE: LEFT HAND
LOCATION SIMPLE: UPPER BACK
LOCATION SIMPLE: LEFT FOREHEAD
LOCATION SIMPLE: CHEST
LOCATION SIMPLE: RIGHT THUMB

## 2024-08-08 ASSESSMENT — LOCATION DETAILED DESCRIPTION DERM
LOCATION DETAILED: EPIGASTRIC SKIN
LOCATION DETAILED: RIGHT RADIAL DORSAL HAND
LOCATION DETAILED: LEFT INFERIOR FOREHEAD
LOCATION DETAILED: RIGHT DISTAL RADIAL THUMB
LOCATION DETAILED: MIDDLE STERNUM
LOCATION DETAILED: LEFT RADIAL DORSAL HAND
LOCATION DETAILED: RIGHT INFERIOR VERMILION LIP
LOCATION DETAILED: PERIUMBILICAL SKIN
LOCATION DETAILED: RIGHT MENTAL CREASE
LOCATION DETAILED: INFERIOR THORACIC SPINE

## 2024-08-08 NOTE — TELEPHONE ENCOUNTER
Received request via: Pharmacy    Medication Name/Dosage carbidopa-levodopa (SINEMET)  MG Tab     When was medication last prescribed 12/01/2023    How many refills were previously provided 2    How many Refills does he patient have left from last prescription 0    Was the patient seen in the last year in this department? Yes   Date of last office visit 05/29/2024     Per last Neurology Office Visit, when was the date of next follow up visit set for?                          5 months  Date of office visit follow up request 10/29/2024     Does the patient have an upcoming appointment? Yes   If yes, when 10/23/2024             If no, schedule appointment scheduled.    Does the patient have assisted Plus and need 100 day supply (blood pressure, diabetes and cholesterol meds only)? Patient does not have SCP

## 2024-09-26 ENCOUNTER — HOSPITAL ENCOUNTER (OUTPATIENT)
Dept: LAB | Facility: MEDICAL CENTER | Age: 64
End: 2024-09-26
Attending: CHIROPRACTOR
Payer: COMMERCIAL

## 2024-09-26 LAB
D DIMER PPP IA.FEU-MCNC: 0.47 UG/ML (FEU) (ref 0–0.5)
ERYTHROCYTE [DISTWIDTH] IN BLOOD BY AUTOMATED COUNT: 42.3 FL (ref 35.9–50)
ERYTHROCYTE [SEDIMENTATION RATE] IN BLOOD BY WESTERGREN METHOD: 33 MM/HOUR (ref 0–25)
EST. AVERAGE GLUCOSE BLD GHB EST-MCNC: 120 MG/DL
HBA1C MFR BLD: 5.8 % (ref 4–5.6)
HCT VFR BLD AUTO: 38.5 % (ref 37–47)
HGB BLD-MCNC: 13.2 G/DL (ref 12–16)
MCH RBC QN AUTO: 32.8 PG (ref 27–33)
MCHC RBC AUTO-ENTMCNC: 34.3 G/DL (ref 32.2–35.5)
MCV RBC AUTO: 95.5 FL (ref 81.4–97.8)
PLATELET # BLD AUTO: 260 K/UL (ref 164–446)
PMV BLD AUTO: 9.8 FL (ref 9–12.9)
RBC # BLD AUTO: 4.03 M/UL (ref 4.2–5.4)
WBC # BLD AUTO: 6 K/UL (ref 4.8–10.8)

## 2024-09-26 PROCEDURE — 84481 FREE ASSAY (FT-3): CPT

## 2024-09-26 PROCEDURE — 82728 ASSAY OF FERRITIN: CPT

## 2024-09-26 PROCEDURE — 36415 COLL VENOUS BLD VENIPUNCTURE: CPT

## 2024-09-26 PROCEDURE — 83090 ASSAY OF HOMOCYSTEINE: CPT

## 2024-09-26 PROCEDURE — 83540 ASSAY OF IRON: CPT

## 2024-09-26 PROCEDURE — 84155 ASSAY OF PROTEIN SERUM: CPT

## 2024-09-26 PROCEDURE — 85379 FIBRIN DEGRADATION QUANT: CPT

## 2024-09-26 PROCEDURE — 83088 ASSAY OF HISTAMINE: CPT

## 2024-09-26 PROCEDURE — 83520 IMMUNOASSAY QUANT NOS NONAB: CPT

## 2024-09-26 PROCEDURE — 86147 CARDIOLIPIN ANTIBODY EA IG: CPT | Mod: 91

## 2024-09-26 PROCEDURE — 84439 ASSAY OF FREE THYROXINE: CPT

## 2024-09-26 PROCEDURE — 86800 THYROGLOBULIN ANTIBODY: CPT

## 2024-09-26 PROCEDURE — 83516 IMMUNOASSAY NONANTIBODY: CPT

## 2024-09-26 PROCEDURE — 83550 IRON BINDING TEST: CPT

## 2024-09-26 PROCEDURE — 85652 RBC SED RATE AUTOMATED: CPT

## 2024-09-26 PROCEDURE — 86376 MICROSOMAL ANTIBODY EACH: CPT

## 2024-09-26 PROCEDURE — 84443 ASSAY THYROID STIM HORMONE: CPT

## 2024-09-26 PROCEDURE — 85027 COMPLETE CBC AUTOMATED: CPT

## 2024-09-26 PROCEDURE — 80069 RENAL FUNCTION PANEL: CPT

## 2024-09-26 PROCEDURE — 82607 VITAMIN B-12: CPT

## 2024-09-26 PROCEDURE — 82746 ASSAY OF FOLIC ACID SERUM: CPT

## 2024-09-26 PROCEDURE — 84075 ASSAY ALKALINE PHOSPHATASE: CPT

## 2024-09-26 PROCEDURE — 83036 HEMOGLOBIN GLYCOSYLATED A1C: CPT

## 2024-09-26 PROCEDURE — 82247 BILIRUBIN TOTAL: CPT

## 2024-09-26 PROCEDURE — 84460 ALANINE AMINO (ALT) (SGPT): CPT

## 2024-09-26 PROCEDURE — 86141 C-REACTIVE PROTEIN HS: CPT

## 2024-09-26 PROCEDURE — 84450 TRANSFERASE (AST) (SGOT): CPT

## 2024-09-26 PROCEDURE — 82248 BILIRUBIN DIRECT: CPT

## 2024-09-27 LAB
ALBUMIN SERPL BCP-MCNC: 4.2 G/DL (ref 3.2–4.9)
ALP SERPL-CCNC: 88 U/L (ref 30–99)
ALT SERPL-CCNC: 7 U/L (ref 2–50)
AST SERPL-CCNC: 26 U/L (ref 12–45)
BILIRUB CONJ SERPL-MCNC: <0.2 MG/DL (ref 0.1–0.5)
BILIRUB INDIRECT SERPL-MCNC: NORMAL MG/DL (ref 0–1)
BILIRUB SERPL-MCNC: 0.2 MG/DL (ref 0.1–1.5)
BUN SERPL-MCNC: 14 MG/DL (ref 8–22)
CALCIUM ALBUM COR SERPL-MCNC: 8.9 MG/DL (ref 8.5–10.5)
CALCIUM SERPL-MCNC: 9.1 MG/DL (ref 8.5–10.5)
CHLORIDE SERPL-SCNC: 102 MMOL/L (ref 96–112)
CO2 SERPL-SCNC: 26 MMOL/L (ref 20–33)
CREAT SERPL-MCNC: 0.77 MG/DL (ref 0.5–1.4)
CRP SERPL HS-MCNC: 11.4 MG/L (ref 0–3)
FERRITIN SERPL-MCNC: 152 NG/ML (ref 10–291)
FOLATE SERPL-MCNC: 23.5 NG/ML
GFR SERPLBLD CREATININE-BSD FMLA CKD-EPI: 87 ML/MIN/1.73 M 2
GLUCOSE SERPL-MCNC: 97 MG/DL (ref 65–99)
HCYS SERPL-SCNC: 7.03 UMOL/L
IRON SATN MFR SERPL: 19 % (ref 15–55)
IRON SERPL-MCNC: 54 UG/DL (ref 40–170)
PHOSPHATE SERPL-MCNC: 3.3 MG/DL (ref 2.5–4.5)
POTASSIUM SERPL-SCNC: 3.8 MMOL/L (ref 3.6–5.5)
PROT SERPL-MCNC: 6.8 G/DL (ref 6–8.2)
SODIUM SERPL-SCNC: 141 MMOL/L (ref 135–145)
T3FREE SERPL-MCNC: 2.84 PG/ML (ref 2–4.4)
T4 FREE SERPL-MCNC: 1.64 NG/DL (ref 0.93–1.7)
THYROPEROXIDASE AB SERPL-ACNC: 19 IU/ML (ref 0–9)
TIBC SERPL-MCNC: 285 UG/DL (ref 250–450)
TSH SERPL-ACNC: 1.48 UIU/ML (ref 0.35–5.5)
UIBC SERPL-MCNC: 231 UG/DL (ref 110–370)
VIT B12 SERPL-MCNC: >4000 PG/ML (ref 211–911)

## 2024-09-28 LAB
CARDIOLIPIN IGA SER IA-ACNC: <10 APL
CARDIOLIPIN IGG SER IA-ACNC: <10 GPL
CARDIOLIPIN IGM SER IA-ACNC: 12 MPL
PCA IGG SER-ACNC: 1.5 UNITS (ref 0–24.9)
THYROGLOB AB SERPL-ACNC: <0.9 IU/ML (ref 0–4)
TRYPTASE SERPL-MCNC: 3.1 UG/L

## 2024-09-30 LAB — HISTAMINE BLD-SCNC: 607 NMOL/L (ref 180–1800)

## 2024-10-02 LAB — HISTAMINE SERPL-SCNC: 9 NMOL/L (ref 0–8)

## 2024-10-09 RX ORDER — LEVOTHYROXINE SODIUM 75 UG/1
TABLET ORAL
Qty: 72 TABLET | Refills: 3 | Status: SHIPPED | OUTPATIENT
Start: 2024-10-09

## 2024-10-23 ENCOUNTER — OFFICE VISIT (OUTPATIENT)
Dept: NEUROLOGY | Facility: MEDICAL CENTER | Age: 64
End: 2024-10-23
Attending: INTERNAL MEDICINE
Payer: COMMERCIAL

## 2024-10-23 VITALS
BODY MASS INDEX: 21.38 KG/M2 | TEMPERATURE: 97.6 F | HEIGHT: 60 IN | SYSTOLIC BLOOD PRESSURE: 108 MMHG | OXYGEN SATURATION: 97 % | DIASTOLIC BLOOD PRESSURE: 72 MMHG | WEIGHT: 108.91 LBS | RESPIRATION RATE: 18 BRPM | HEART RATE: 71 BPM

## 2024-10-23 DIAGNOSIS — G24.9 DYSTONIA OF FOOT: ICD-10-CM

## 2024-10-23 DIAGNOSIS — G44.86 CERVICOGENIC HEADACHE: ICD-10-CM

## 2024-10-23 DIAGNOSIS — G20.A2 PARKINSON'S DISEASE WITHOUT DYSKINESIA, WITH FLUCTUATING MANIFESTATIONS (HCC): ICD-10-CM

## 2024-10-23 PROCEDURE — 99212 OFFICE O/P EST SF 10 MIN: CPT | Performed by: INTERNAL MEDICINE

## 2024-10-23 PROCEDURE — 3074F SYST BP LT 130 MM HG: CPT | Performed by: INTERNAL MEDICINE

## 2024-10-23 PROCEDURE — 3078F DIAST BP <80 MM HG: CPT | Performed by: INTERNAL MEDICINE

## 2024-10-23 PROCEDURE — G2211 COMPLEX E/M VISIT ADD ON: HCPCS | Performed by: INTERNAL MEDICINE

## 2024-10-23 PROCEDURE — 99215 OFFICE O/P EST HI 40 MIN: CPT | Performed by: INTERNAL MEDICINE

## 2024-10-23 RX ORDER — TRIHEXYPHENIDYL HYDROCHLORIDE 2 MG/1
1 TABLET ORAL 3 TIMES DAILY
Qty: 135 TABLET | Refills: 3 | Status: SHIPPED | OUTPATIENT
Start: 2024-10-23 | End: 2025-10-23

## 2024-10-23 ASSESSMENT — FIBROSIS 4 INDEX: FIB4 SCORE: 2.34

## 2024-10-23 ASSESSMENT — PATIENT HEALTH QUESTIONNAIRE - PHQ9: CLINICAL INTERPRETATION OF PHQ2 SCORE: 0

## 2024-11-15 ENCOUNTER — HOSPITAL ENCOUNTER (OUTPATIENT)
Dept: LAB | Facility: MEDICAL CENTER | Age: 64
End: 2024-11-15
Attending: CHIROPRACTOR
Payer: COMMERCIAL

## 2024-11-15 LAB
25(OH)D3 SERPL-MCNC: 54 NG/ML (ref 30–100)
ALBUMIN SERPL BCP-MCNC: 4.4 G/DL (ref 3.2–4.9)
ALP SERPL-CCNC: 87 U/L (ref 30–99)
ALT SERPL-CCNC: 7 U/L (ref 2–50)
AST SERPL-CCNC: 27 U/L (ref 12–45)
BASOPHILS # BLD AUTO: 0.5 % (ref 0–1.8)
BASOPHILS # BLD: 0.04 K/UL (ref 0–0.12)
BILIRUB CONJ SERPL-MCNC: <0.2 MG/DL (ref 0.1–0.5)
BILIRUB INDIRECT SERPL-MCNC: NORMAL MG/DL (ref 0–1)
BILIRUB SERPL-MCNC: 0.2 MG/DL (ref 0.1–1.5)
BUN SERPL-MCNC: 19 MG/DL (ref 8–22)
CALCIUM ALBUM COR SERPL-MCNC: 8.9 MG/DL (ref 8.5–10.5)
CALCIUM SERPL-MCNC: 9.2 MG/DL (ref 8.5–10.5)
CHLORIDE SERPL-SCNC: 100 MMOL/L (ref 96–112)
CO2 SERPL-SCNC: 26 MMOL/L (ref 20–33)
CREAT SERPL-MCNC: 0.74 MG/DL (ref 0.5–1.4)
CRP SERPL HS-MCNC: 3.8 MG/L (ref 0–3)
EOSINOPHIL # BLD AUTO: 0.13 K/UL (ref 0–0.51)
EOSINOPHIL NFR BLD: 1.7 % (ref 0–6.9)
ERYTHROCYTE [DISTWIDTH] IN BLOOD BY AUTOMATED COUNT: 40.8 FL (ref 35.9–50)
ERYTHROCYTE [SEDIMENTATION RATE] IN BLOOD BY WESTERGREN METHOD: 14 MM/HOUR (ref 0–25)
GFR SERPLBLD CREATININE-BSD FMLA CKD-EPI: 91 ML/MIN/1.73 M 2
GLUCOSE SERPL-MCNC: 102 MG/DL (ref 65–99)
HCT VFR BLD AUTO: 44.3 % (ref 37–47)
HGB BLD-MCNC: 15.2 G/DL (ref 12–16)
IMM GRANULOCYTES # BLD AUTO: 0.03 K/UL (ref 0–0.11)
IMM GRANULOCYTES NFR BLD AUTO: 0.4 % (ref 0–0.9)
LYMPHOCYTES # BLD AUTO: 1.87 K/UL (ref 1–4.8)
LYMPHOCYTES NFR BLD: 25 % (ref 22–41)
MCH RBC QN AUTO: 33 PG (ref 27–33)
MCHC RBC AUTO-ENTMCNC: 34.3 G/DL (ref 32.2–35.5)
MCV RBC AUTO: 96.3 FL (ref 81.4–97.8)
MONOCYTES # BLD AUTO: 0.43 K/UL (ref 0–0.85)
MONOCYTES NFR BLD AUTO: 5.8 % (ref 0–13.4)
NEUTROPHILS # BLD AUTO: 4.97 K/UL (ref 1.82–7.42)
NEUTROPHILS NFR BLD: 66.6 % (ref 44–72)
NRBC # BLD AUTO: 0 K/UL
NRBC BLD-RTO: 0 /100 WBC (ref 0–0.2)
PHOSPHATE SERPL-MCNC: 2.5 MG/DL (ref 2.5–4.5)
PLATELET # BLD AUTO: 283 K/UL (ref 164–446)
PMV BLD AUTO: 10.5 FL (ref 9–12.9)
POTASSIUM SERPL-SCNC: 3.9 MMOL/L (ref 3.6–5.5)
PROT SERPL-MCNC: 7.4 G/DL (ref 6–8.2)
RBC # BLD AUTO: 4.6 M/UL (ref 4.2–5.4)
SODIUM SERPL-SCNC: 138 MMOL/L (ref 135–145)
T3FREE SERPL-MCNC: 2.27 PG/ML (ref 2–4.4)
T4 FREE SERPL-MCNC: 1.5 NG/DL (ref 0.93–1.7)
THYROPEROXIDASE AB SERPL-ACNC: 11 IU/ML (ref 0–9)
TSH SERPL-ACNC: 0.92 UIU/ML (ref 0.35–5.5)
WBC # BLD AUTO: 7.5 K/UL (ref 4.8–10.8)

## 2024-11-15 PROCEDURE — 82247 BILIRUBIN TOTAL: CPT

## 2024-11-15 PROCEDURE — 82306 VITAMIN D 25 HYDROXY: CPT

## 2024-11-15 PROCEDURE — 86141 C-REACTIVE PROTEIN HS: CPT

## 2024-11-15 PROCEDURE — 84481 FREE ASSAY (FT-3): CPT

## 2024-11-15 PROCEDURE — 82248 BILIRUBIN DIRECT: CPT

## 2024-11-15 PROCEDURE — 85025 COMPLETE CBC W/AUTO DIFF WBC: CPT

## 2024-11-15 PROCEDURE — 85652 RBC SED RATE AUTOMATED: CPT

## 2024-11-15 PROCEDURE — 80069 RENAL FUNCTION PANEL: CPT

## 2024-11-15 PROCEDURE — 84443 ASSAY THYROID STIM HORMONE: CPT

## 2024-11-15 PROCEDURE — 86800 THYROGLOBULIN ANTIBODY: CPT

## 2024-11-15 PROCEDURE — 36415 COLL VENOUS BLD VENIPUNCTURE: CPT

## 2024-11-15 PROCEDURE — 83088 ASSAY OF HISTAMINE: CPT

## 2024-11-15 PROCEDURE — 84450 TRANSFERASE (AST) (SGOT): CPT

## 2024-11-15 PROCEDURE — 84075 ASSAY ALKALINE PHOSPHATASE: CPT

## 2024-11-15 PROCEDURE — 84439 ASSAY OF FREE THYROXINE: CPT

## 2024-11-15 PROCEDURE — 84155 ASSAY OF PROTEIN SERUM: CPT

## 2024-11-15 PROCEDURE — 83520 IMMUNOASSAY QUANT NOS NONAB: CPT

## 2024-11-15 PROCEDURE — 84460 ALANINE AMINO (ALT) (SGPT): CPT

## 2024-11-15 PROCEDURE — 86376 MICROSOMAL ANTIBODY EACH: CPT

## 2024-11-17 LAB — THYROGLOB AB SERPL-ACNC: <0.9 IU/ML (ref 0–4)

## 2024-11-18 LAB
HISTAMINE SERPL-SCNC: 8 NMOL/L (ref 0–8)
TRYPTASE SERPL-MCNC: 3.2 UG/L

## 2024-12-04 ENCOUNTER — OFFICE VISIT (OUTPATIENT)
Dept: MEDICAL GROUP | Facility: PHYSICIAN GROUP | Age: 64
End: 2024-12-04
Payer: COMMERCIAL

## 2024-12-04 VITALS
BODY MASS INDEX: 20.93 KG/M2 | OXYGEN SATURATION: 97 % | HEIGHT: 60 IN | TEMPERATURE: 98.5 F | HEART RATE: 89 BPM | WEIGHT: 106.6 LBS | SYSTOLIC BLOOD PRESSURE: 104 MMHG | DIASTOLIC BLOOD PRESSURE: 62 MMHG

## 2024-12-04 DIAGNOSIS — Z12.31 ENCOUNTER FOR SCREENING MAMMOGRAM FOR MALIGNANT NEOPLASM OF BREAST: ICD-10-CM

## 2024-12-04 DIAGNOSIS — Z00.00 WELLNESS EXAMINATION: Primary | ICD-10-CM

## 2024-12-04 PROCEDURE — 3074F SYST BP LT 130 MM HG: CPT | Performed by: FAMILY MEDICINE

## 2024-12-04 PROCEDURE — 99396 PREV VISIT EST AGE 40-64: CPT | Performed by: FAMILY MEDICINE

## 2024-12-04 PROCEDURE — 3078F DIAST BP <80 MM HG: CPT | Performed by: FAMILY MEDICINE

## 2024-12-04 ASSESSMENT — FIBROSIS 4 INDEX: FIB4 SCORE: 2.27

## 2024-12-04 NOTE — PROGRESS NOTES
Subjective:     CC:   Chief Complaint   Patient presents with    Annual Exam     HPI:   Mariela Huff is a 64 y.o. female who presents for annual exam. She is feeling well and denies any complaints.    Ob-Gyn/ History:    Patient has GYN provider: no  /Para:  2/2  Last Pap Smear:  unknown. No history of abnormal pap smears.  Gyn Surgery:  hysterectomy.  Current Contraceptive Method:  N/a. Yes currently sexually active.  Last menstrual period:  N/a.  No significant bloating/fluid retention, pelvic pain, or dyspareunia. No vaginal discharge  Post-menopausal bleeding: no  Urinary incontinence: +stress - sometimes  Folate intake: n/a     Health Maintenance  Advanced directive: n/a   Osteoporosis Screen/ DEXA: n/a   PT/vit D for falls prevention: n/a   Cholesterol Screenin2023 - T chol 171, TG 39, HDL 92, LDL 71; ASCVD 2.3%  Diabetes Screenin2023 - fasting glucose 91  Aspirin Use: n/a     Family History  Updated: yes  FHS-7 score: 0    Anticipatory Guidance   Diet: healthy - anti-inflammatory diet  Exercise: boxing class 3 times/week  Substance Abuse: no   Safe in relationship.  Seat belts, bike helmet, gun safety discussed.  Sun protection used.  Dental home.     Cancer screening  Colorectal Cancer Screening: due    Lung Cancer Screening: n/a - never smoker  Cervical Cancer Screening: n/a - s/p hysterectomy  Breast Cancer Screening: ordered    Infectious disease screening/Immunizations  --STI Screening: declined   --Practices safe sex.  --HIV Screening: declined   --Hepatitis C Screening: completed - negative   --Immunizations:    Influenza: declined   HPV:  n/a    Tetanus: due     Shingles: completed   Pneumococcal: due    COVID-19: 202- booster recommended   Other immunizations: n/a     She  has a past medical history of Anesthesia, Arthritis, Chest tightness, Cough, Diverticulosis (), Hypothyroid, Infectious disease, Lyme disease, Pain (2018), Painful breathing,  and Shortness of breath.    She has no past medical history of Chest pain, Difficulty breathing, Fainting, Palpitations, Sputum production, Swelling of lower extremity, or Wheezing.  She  has a past surgical history that includes hysterectomy, vaginal (1999); shoulder decompression arthroscopic (5/13/2009); rotator cuff repair (5/13/2009); other orthopedic surgery (1993); shoulder arthroscopy (12/1/2010); shoulder decompression (12/1/2010); rotator cuff repair (12/1/2010); breast biopsy (1/24/2012); pr breast augmentation with implant (Bilateral, 1999); us-needle core bx-breast panel; breast implant revision (Bilateral, 4/11/2016); bunionectomy (Right, 5/1/2017); neuroma excision (5/1/2017); toe fusion (Right, 5/7/2018); repair, tendon, lower extremity, using tendon graft (Right, 5/7/2018); hardware removal ortho (Right, 5/7/2018); orthopedic osteotomy (Right, 5/7/2018); pr shldr arthroscop,surg,w/rotat cuff repr (Right, 12/14/2022); pr repair biceps long tendon (Right, 12/14/2022); and pr shldr arthroscop,part acromioplas (Right, 12/14/2022).    Family History   Problem Relation Age of Onset    Cancer Paternal Aunt         breast    Diabetes Mother     Thyroid Mother     Diabetes Father     Thyroid Daughter     Thyroid Sister     Cancer Paternal Aunt         breast, uterine    Cancer Paternal Aunt         bladder    Cancer Paternal Aunt         colon    Heart Disease Neg Hx     Stroke Neg Hx     Alcohol/Drug Neg Hx        Social History     Socioeconomic History    Marital status:      Spouse name: Not on file    Number of children: Not on file    Years of education: Not on file    Highest education level: Bachelor's degree (e.g., BA, AB, BS)   Occupational History    Not on file   Tobacco Use    Smoking status: Never    Smokeless tobacco: Never   Vaping Use    Vaping status: Never Used   Substance and Sexual Activity    Alcohol use: Yes     Alcohol/week: 0.6 oz     Types: 1 Glasses of wine per week      Comment: Occasionally    Drug use: No    Sexual activity: Yes     Partners: Male   Other Topics Concern    Not on file   Social History Narrative    Not on file     Social Drivers of Health     Financial Resource Strain: Low Risk  (1/12/2021)    Overall Financial Resource Strain (CARDIA)     Difficulty of Paying Living Expenses: Not hard at all   Food Insecurity: No Food Insecurity (1/12/2021)    Hunger Vital Sign     Worried About Running Out of Food in the Last Year: Never true     Ran Out of Food in the Last Year: Never true   Transportation Needs: No Transportation Needs (1/12/2021)    PRAPARE - Transportation     Lack of Transportation (Medical): No     Lack of Transportation (Non-Medical): No   Physical Activity: Sufficiently Active (1/12/2021)    Exercise Vital Sign     Days of Exercise per Week: 5 days     Minutes of Exercise per Session: 50 min   Stress: No Stress Concern Present (1/12/2021)    [employer] - Occupational Stress Questionnaire     Feeling of Stress : Not at all   Social Connections: Unknown (1/12/2021)    Social Connection and Isolation Panel [NHANES]     Frequency of Communication with Friends and Family: More than three times a week     Frequency of Social Gatherings with Friends and Family: Once a week     Attends Congregation Services: Patient declined     Active Member of Clubs or Organizations: Yes     Attends Club or Organization Meetings: More than 4 times per year     Marital Status:    Intimate Partner Violence: Not on file   Housing Stability: Low Risk  (1/12/2021)    Housing Stability Vital Sign     Unable to Pay for Housing in the Last Year: No     Number of Places Lived in the Last Year: 1     Unstable Housing in the Last Year: No       Patient Active Problem List    Diagnosis Date Noted    Dry mouth 09/15/2023    Headache 09/15/2023    Breast symptom 05/31/2023    Biceps tendonitis on right 11/01/2022    Dystonia of foot 12/14/2021     Parkinson's disease without dyskinesia, with fluctuating manifestations (HCC) 06/11/2021    Dysphagia 04/05/2021    Arthralgia 02/28/2018    Hypothyroidism 01/24/2018         Current Outpatient Medications   Medication Sig Dispense Refill    NON SPECIFIED He Peptic Support - Licorice extract, L-glutamine, mstic gum, citrus fruit extract, bismuth, gamma oryzanol, chlorella, chinese rhubarb extract      NON SPECIFIED Palmitoylethanolamide + luteolin      NON SPECIFIED Pyloricil - zinc, bismuth, mastic gum, berberine      trihexyphenidyl (ARTANE) 2 MG Tab Take 0.5 Tablets by mouth 3 times a day. 135 Tablet 3    levothyroxine (SYNTHROID) 75 MCG Tab TAKE 1 TABLET 6 DAYS A WEEK ON AN EMPTY STOMACH 72 Tablet 3    carbidopa-levodopa (SINEMET)  MG Tab Take 1 Tablet by mouth 3 times a day for 360 days. 270 Tablet 3    famotidine (PEPCID) 20 MG Tab TAKE 1 TABLET BY MOUTH TWICE A  tablet 0    Cyanocobalamin (B-12) 3000 MCG SL Tab Place 1 Tab under tongue.      Ascorbic Acid (VITAMIN C) 500 MG Chew Tab Take 500 mg by mouth every day.      Cholecalciferol (VITAMIN D) 50 MCG (2000 UT) Cap Take 1 Cap by mouth.       No current facility-administered medications for this visit.     Allergies   Allergen Reactions    Pcn [Penicillins] Rash     Rash      Percocet [Oxycodone-Acetaminophen] Vomiting    Symbicort [Budesonide-Formoterol Fumarate]      Laryngitis        Review of Systems   Constitutional: Negative for fever, chills.   HENT: Negative for sore throat.    Eyes: Negative for pain.   Respiratory: Negative for cough.  Cardiovascular: Negative for leg swelling.   Gastrointestinal: Negative for nausea, vomiting.   Genitourinary: Negative for dysuria.   Skin: Negative for rash.   Neurological: Negative for dizziness.   Endo/Heme/Allergies: Does not bleed easily.   Psychiatric/Behavioral: Negative for depression.  The patient is not nervous/anxious.     Objective:     /62 (BP Location: Left arm, Patient Position:  Sitting, BP Cuff Size: Adult)   Pulse 89   Temp 36.9 °C (98.5 °F) (Temporal)   Ht 1.524 m (5')   Wt 48.4 kg (106 lb 9.6 oz)   SpO2 97%   BMI 20.82 kg/m²   Body mass index is 20.82 kg/m².  Wt Readings from Last 4 Encounters:   12/04/24 48.4 kg (106 lb 9.6 oz)   10/23/24 49.4 kg (108 lb 14.5 oz)   07/01/24 49.4 kg (109 lb)   05/30/24 50.7 kg (111 lb 12.8 oz)       Physical Exam:  Constitutional: Well-developed and well-nourished. Not diaphoretic. No distress.   Skin: Skin is warm and dry. No rash noted.  Head: Atraumatic without lesions.  Eyes: Conjunctivae and extraocular motions are normal. Pupils are equal, round, and reactive to light. No scleral icterus.   Ears:  External ears unremarkable. Tympanic membranes clear and intact.  Mouth/Throat: Dentition is good. Tongue normal. Oropharynx is clear and moist. Posterior pharynx without erythema or exudates.  Neck: Supple, trachea midline. Normal range of motion. No thyromegaly present. No lymphadenopathy--cervical or supraclavicular.  Cardiovascular: Regular rate and rhythm, S1 and S2 without murmur, rubs, or gallops.  Lungs: Normal inspiratory effort, CTA bilaterally, no wheezes/rhonchi/rales  Abdomen: Soft, non tender, and without distention. Active bowel sounds in all four quadrants. No rebound, guarding, masses or HSM.  Extremities: No cyanosis, clubbing, erythema, nor edema. Distal pulses intact and symmetric.   Musculoskeletal: All major joints AROM full in all directions without pain.  Neurological: Alert and oriented x 3. DTRs 2+/3 and symmetric. No cranial nerve deficit. 5/5 myotomes. Sensation intact.   Psychiatric:  Behavior, mood, and affect are appropriate.    Assessment and Plan:     1. Wellness examination        2. Encounter for screening mammogram for malignant neoplasm of breast  MA-SCREENING MAMMO BILAT W/IMPLANTS W/ANDREINA W/CAD             HCM:  Up to date   Labs per orders  Immunizations per orders  Patient counseled about skin care, diet,  supplements, prenatal vitamins, safe sex and exercise.    Follow-up: Return in about 6 months (around 6/4/2025) for f/u anti-inflammatory diet and supplements.

## 2025-01-07 ENCOUNTER — HOSPITAL ENCOUNTER (OUTPATIENT)
Dept: RADIOLOGY | Facility: MEDICAL CENTER | Age: 65
End: 2025-01-07
Attending: FAMILY MEDICINE
Payer: COMMERCIAL

## 2025-01-07 DIAGNOSIS — Z12.31 ENCOUNTER FOR SCREENING MAMMOGRAM FOR MALIGNANT NEOPLASM OF BREAST: ICD-10-CM

## 2025-02-06 ENCOUNTER — PATIENT MESSAGE (OUTPATIENT)
Dept: NEUROLOGY | Facility: MEDICAL CENTER | Age: 65
End: 2025-02-06
Payer: COMMERCIAL

## 2025-02-06 DIAGNOSIS — R13.10 DYSPHAGIA, UNSPECIFIED TYPE: ICD-10-CM

## 2025-02-06 DIAGNOSIS — G20.A2 PARKINSON'S DISEASE WITHOUT DYSKINESIA, WITH FLUCTUATING MANIFESTATIONS (HCC): ICD-10-CM

## 2025-02-18 ENCOUNTER — SPEECH THERAPY (OUTPATIENT)
Dept: SPEECH THERAPY | Facility: OTHER | Age: 65
End: 2025-02-18
Attending: INTERNAL MEDICINE
Payer: COMMERCIAL

## 2025-02-18 DIAGNOSIS — G20.A2 PARKINSON'S DISEASE WITHOUT DYSKINESIA, WITH FLUCTUATING MANIFESTATIONS (HCC): ICD-10-CM

## 2025-02-18 PROCEDURE — 92524 BEHAVRAL QUALIT ANALYS VOICE: CPT | Mod: GN,GC | Performed by: INTERNAL MEDICINE

## 2025-02-18 NOTE — OP THERAPY EVALUATION
Outpatient Speech Therapy  INITIAL EVALUATION    [facility] Speech Pathology & Audiology  [address] [place] [id number]  Phone:  [phone]  Fax:  [phone]    Date of Evaluation: 02/18/2025    Patient: Mariela Huff  YOB: 1960  MRN: [id number]     Referring Provider: GUMARO Wright [address] [address]   Referring Diagnosis Dysphagia, unspecified type [R13.10];Parkinson's disease without dyskinesia, with fluctuating manifestations (HCC) [G20.A2]     Time Calculation    Start time: 1300  Stop time: 1500 Time Calculation (min): 120 minutes           Chief Complaint: Dysphagia (Swallowing difficulty secondary to Parkinson's Diagnosis)    Visit Diagnoses     ICD-10-CM   1. Parkinson's disease without dyskinesia, with fluctuating manifestations (HCC)  G20.A2     Subjective:   Reason for Therapy:     Reason For Evaluation:  Dysphagia    Past Medical History:   Diagnosis Date    Anesthesia     severe ponv, hard to wake up    Arthritis     shoulders    Chest tightness     Cough     Diverticulosis 2012    pt stated it was noted on colonoscopy    Hypothyroid     hypothyroid    Infectious disease     around bronchitis/flu 12/2011    Lyme disease     chronic    Pain 04/2018    right foot, bilateral shoulder    Painful breathing     Shortness of breath      Past Surgical History:   Procedure Laterality Date    PB SHLDR ARTHROSCOP,SURG,W/ROTAT CUFF REPB Right 12/14/2022    Procedure: RIGHT SHOULDER ARTHROSCOPY ROTATOR CUFF REPAIR;  Surgeon: Mildred Hu M.D.;  Location: [facility] [place];  Service: Orthopedics    PB REPAIR BICEPS LONG TENDON Right 12/14/2022    Procedure: RIGHT BICEPS TENODESIS;  Surgeon: Mildred Hu M.D.;  Location: [facility];  Service: Orthopedics    PA SHLDR ARTHROSCOP,PART ACROMIOPLAS Right 12/14/2022    Procedure: RIGHT SUBACROMIAL DECOMPRESSION, LABRAL DEBRIDEMENT, REPAIRS AS  INDICATED;  Surgeon: Mildred Hu M.D.;  Location: [facility] [place];  Service: Orthopedics    TOE FUSION Right 5/7/2018    Procedure: TOE FUSION - 1ST MTP;  Surgeon: Janes Benavidez M.D.;  Location: SURGERY SAME DAY [facility];  Service: Orthopedics    REPAIR, TENDON, LOWER EXTREMITY, USING TENDON GRAFT Right 5/7/2018    Procedure: FLEXOR TENDON REPAIR - 4TH RELEASE W/SOFT TISSUE RELEASE 2/3;  Surgeon: Janes Benavidez M.D.;  Location: SURGERY SAME DAY [facility];  Service: Orthopedics    HARDWARE REMOVAL ORTHO Right 5/7/2018    Procedure: HARDWARE REMOVAL ORTHO - INTERNAL FIXATION;  Surgeon: Janes Benavidez M.D.;  Location: SURGERY SAME DAY [facility];  Service: Orthopedics    ORTHOPEDIC OSTEOTOMY Right 5/7/2018    Procedure: ORTHOPEDIC OSTEOTOMY - DISTAL METATARSAL 2/3/4;  Surgeon: Jaens Benavidez M.D.;  Location: SURGERY SAME DAY [facility];  Service: Orthopedics    BUNIONECTOMY Right 5/1/2017    Procedure: BUNIONECTOMY - PROXIMAL HALLUX VALGUS CORRECTION W/BONE GRAFT;  Surgeon: Janes Benavidez M.D.;  Location: SURGERY [facility];  Service:     NEUROMA EXCISION  5/1/2017    Procedure: NEUROMA EXCISION - 2ND WEBSPACE;  Surgeon: Janes Benavidez M.D.;  Location: [facility];  Service:     BREAST IMPLANT REVISION Bilateral 4/11/2016    Procedure: BREAST IMPLANT EXCHANGE OF SALINE TO SILICONE W/REPOSITIONING OF LATERAL FOLDS;  Surgeon: Mikey Adkins M.D.;  Location: SURGERY [facility];  Service:     BREAST BIOPSY  1/24/2012    Performed by SANDRA ESTRADA at SURGERY SAME DAY [facility]    SHOULDER ARTHROSCOPY  12/1/2010    Performed by KATE CHANCE at SURGERY [facility]    SHOULDER DECOMPRESSION  12/1/2010    Performed by KATE CHANCE at SURGERY [facility]    ROTATOR CUFF REPAIR  12/1/2010    Performed by KATE CHANCE at [facility]    SHOULDER DECOMPRESSION ARTHROSCOPIC  5/13/2009    [facility]    ROTATOR CUFF REPAIR  2009    Performed by KATE CHANCE at SURGERY [facility]    HYSTERECTOMY, VAGINAL      fibroids    NM BREAST AUGMENTATION WITH IMPLANT Bilateral     OTHER ORTHOPEDIC SURGERY      joint repair left thumb    US-NEEDLE CORE BX-BREAST PANEL       Background: Mrs. Mariela Huff was referred to [facility] Speech-Language and Hearing clinic by her neurologist, Pasquale Santiago D.O., for concerns about her voice and swallowing related to Parkinson's Disease. Dr. Santiago reported Mrs. Huff's current Shashank and Yahr scale is a level 2. Mrs. Huff takes anti-parkinson medications and exercises daily.  Mrs. Huff was clinically assessed on 2025 by Juliann Valdez, Ph.D. Mrs. Huff was diagnosed with Parkinson's disease May of 2018. She stated that she feels pressure just above her suprasternal notch after swallowing pills and other food items as well as hoarse vocal quality, and periods of oral dryness. This description was corroborated by a report from her PCP, Abi Davila M.D., on 2021.    Current Status: Mrs. Huff arrived to the evaluation on time and was alert for the duration. She was unaccompanied to the session today and independent in daily task. She endorsed an active lifestyle and is a current  of a Parkinson Support Group for [place].     Swallow Status: Mrs. Huff reports eating all consistencies and viscosities (FOIS 7).      Intake Form - Filled out by Patient before evaluation  Identifying information  Name: Mariela Huff   Email address: [email or web address]  Address: [address]  City: [place]   State: [place]   Zip: [address]  Phone Number: [phone]   Age: 64   : [birth year]  Date and Time of day of initial interview:   Diagnosis stage: Shashank and Yahr Scale 2   Date of initial diagnosis: 2017  Time of last PD medication: 10:00 am   Time of next PD medication: 2:00  "pm    Neurologist: Dr. Santiago    Neurological and other medical information:  What were the intial symptoms of your Parkinson disease?  \"Dystonia in my feet, tremor right hand\"    Do you have any other medical problems? If yes, please describe:  Hypothyroid    Do you have any other medical diagnosis. If yes, please list:  Hypothyroid, chronic lyme disease    Medication Information:  Medication for Parkinson’s disease: Sinemet, Artane, Mucuna  Dose: 25/100 (no clarification)   Frequency:  Not stated   Are you taking the medications as recommended?   Yes + no  Does your PD medication affect your voice or speech?   No  Do you experience on/off symptoms?   Yes    Surgical Information:  Have you ever had neurosurgery or laryngeal surgery? Uf yes, what procedure, when, where, and by whom? No    Speech Symptoms:   Have you ever used your voice professionally (ie.e., radio, television, acting, singing, etc.)   No  When did you first start to notice communication symptoms?   My issues are with swallowing  Has Parkinson disease caused you to talk less?   No    Swallowing Information  Have you noticed any problems with eating, chewing, and/or swallowing?   Yes  If yes, please describe: \"Medications feel like they get stuck, sinus drainage at night, occasionally feels like a spasm\"  When did you first notice this?   \"On and off for a year but worse the last few months\"  Does anything make it better?   \"I usually drink lots of water when it happens\"  Does anything make it worse?   No  Is there anything you’re currently avoiding?   Gluten  Any recent respiratory infections?   No  Has your swallowing problem changed over time?  Yes  Have you ever had a previous swallow study?   Y/N  What aspect of your Parkinson’s disease bothers you the most?  \"Dystonia and gait issues\"    Employment Information:  Are you employed? If not, skip this section. Retired    Do you now have, or ever had, any of the following?  Patient reported \"yes\" to " "the following:  Allergies, Arthritis, Acid indigestion, Trouble walking, Swallowing problems      Speech Therapy Objective    Voice Handicap Index     0= Never         1= Almost Never         2= Sometimes             3=Almost always         4= Always      My voice makes it difficult for people to hear me. 0   People have difficulty understanding me in a noisy room. 0   My family has difficuity hearing me when I call them throughout the house. 0   I use the phone less often than I would like to. 0   I tend to avoid groups of people because of my voice. 0   I speak with friends, neighbors, or relatives less often because of my voice. 0   People ask me to repeat myself when speaking face-to-face. 0   My voice difficulties restrict my personal and social life. 0   I feel left out of conversations because of my voice. 0   My voice problem causes me to lose income  0   SUBTOTAL 0         I run out of air when I talk 0   The sound of my voice varies throughout the day. 0   People ask, \"What's wrong with your voice?\" 0   My voice sounds creaky and dry. 0   I feel as though I have to strain to produce voice. 0   The clarity of my voice is unpredictable.    0   I try to change my voice to sound different. 0   I use a great deal of effort to speak. 0   My voice is worse in the evening. 0   My voice \"gives out\" on me in the middle of speaking  0   SUBTOTAL 0      l am tense when talking to others because of my voice. 0   People seem irritated with my voice. 0   I find other people don't understand my voice problem. 0   My voice problem upsets me. 0   I am less outgoing because of my voice problem. 0   My voice makes me feels handicapped. 0   I feel annoyed when people ask me to repeat. 0   I feel embarrassed when people ask me to repeat. 0   My voice makes me feel incompetent. 0   I am ashamed of my voice problem 0   SUBTOTAL 0                                                                              TOTAL: 0       Eating " "Assessment Tool (EAT-10)      Height: 5'0  Weight: 105 lbs     Swallowing Problem Ratings (Patient-Scored as: 0 (No problem), 1, 2, 3, or 4 (Severe problem))  If the EAT-10 score is 3 or higher, you may have problems swallowing efficiently and safely.     1. My swallowing problem has caused me to lose weight: 0     2. My swallowing problem interferes with my ability to go out for meals: 0     3. Swallowing liquids takes extra efforts: No Response     4. Swallowing solids takes extra effort: No Response     5. Swallowing pills takes extra effort: 2     6. Swallowing is painful: 1     7. The pleasure of eating is affected by my swallowin     8. Food sticks in my throat when I swallow: 1     9. I cough when I eat: 1     10. Swallowing is stressful: 0      Total EAT-10: 5  (Zak, 2008)    Orientation directions  Oriented to person?    Y (name, , age)  Oriented to place? Y (outpatient clinic, city, state)  Oriented to time? Y (time, date, day of the week, month, year)  Oriented to situation?  Y (why they are here)     Mental status (judy next to most appropriate answer):    Alert/responsive x     Cooperative     Confused      Lethargic     Impulsive      Uncooperative      Combative        Please complete the Mini Mental Status Exam (MMSE) here. The patient must score 24 or higher to continue with data collection for the research study.  Orientation What is the (year) (season) (date) (day) (month)?  Where are we (state) (country) (town) (hospital) (floor)? Score: 5/5     Score: 5/5        Registration Repeat 3 words:  Apple  Chicken  Water      Trials: 2 Score: 3/3   Attention and Calculation Spell \"world\" backwards. Score: 5/5      Recall  Ask for the 3 words repeated above. Score: 3/3   Language Name a pencil and a watch.  Repeat: \"No ifs, ands, or buts.\"  Follow a 3 stage command (i.e., \"Take this paper in your hand, fold it in half, and put it on the table.\")  Read and obey the following: CLOSE YOUR " "EYES.  Write a sentence (i.e., \"Hi, how are you?\")  Copy the design shown Score: 2/2  Score: 1/1     Score: 3/3           Score: 1/1        Score: 1/1      Score: 1/1      MMSE score: 30  Pass for study? Yes    Comments: Mrs. Mazariegos cognition scored within functional limits.     Instrumental Examination    Procedure: sEMG recorded and voice recorded  Liquid: Number of swallows on sEMG Number of swallows on voice recording   Thin y y   Thin y y   Thin y y   Thin y y   Thin y y   Thin y y   Thin y y   Thin y y   Thin y y   Thin y y   Thin y y   Thin y y   Thin y y   Thin y y   Thin y y   Thin y y   Thin y y   Thin y y   Thin y y   Thin y y   Total 20 20   reliability 100% 100%        Comfortable Sip Size: 15 cc   Comments: Mrs. Huff was presented with 90 cc of water and asked to take comfortable swallows of all the water. Mrs. Mazariegos swallowed 6 times, but it was noted that her initial and final swallow were bigger than the middle 4 swallows.     Physical Examination:      Posture: Seated upright, shoulders back  Glasses: Yes  Dentition: Unremarkable  Status of oral   cavity: Unremarkable    Mobility: Ambulatory  Respiratory Status:    Room air: X    O2:   Other:   Other continued:       Sensorimotor Cranial Nerve Examination (Sabrina, 2017):     Eye ptosis  None (x) Right ( ) Left ( ) Both ( ) Oral Cavity ( )     Dentition:  Intact (x) Missing ( ) Poor condition ( ) Loose ( ) Overcrowded ( ) Dental work ( ) Edentulous ( )  Location/Description:     Dentures: None  Partial ( ) Full set ( ) Fitting ( ) Ill-fitting ( ) Poor condition ( ) Oral hygiene/condition: Clean/moist ( ) Thrush ( ) Dry/Xerostomia ( )   Lesions ( ) Location/Description:     Secretions:  Thin (x ) Thick ( ) Excessive secretions ( ) Discolored ( ) Drooling ( ) within functional limits (x)     Palate:  Arch - Normal (x) High ( ) Low ( ) Wide ( ) Narrow ( ) Obturator ( ) Fistula ( ) Submucous cleft ( ) Bifid uvula ( ) Lesion ( ) "   Location/Description:      Tonsils:  Present (?) Enlarged ( ) Right ( ) Left ( ) Both ( ) Absent ( )   Other Observations:     Cranial Nerve Testing:     Cranial Nerve V (Trigeminal)     Gross facial sensation:  (Ask the patient to close their eyes and use a gloved finger or cotton tips to touch their forehead, cheek, jaw, and anterior 2/3 of the tongue on both sides. Ask them to tell you “yes” when they feel something. Have them tell you if there is a difference between either side.)  Is it: Adequate (x) Reduced ( )  If reduced: Right ( ) Left ( ) Both ( )  Location/Description: Right forehead; Left cheek and jaw.   Branch impaired: Ophthalmic, maxillary, mandibular (mandibular is the only motor component)      Temporalis  (With gloved fingers, place fingertips on the temporalis muscle at each temple simultaneously.Have them clench their teeth, relax, then clench again):  Adequate (x) Reduced ( ) Right ( ) Left ( ) Both ( )     Masseter  (With gloved fingers, simultaneously place fingertips on the masseter muscle at each side of the jaw. Have them clench their teeth, relax, then clench again):  Adequate (x) Reduced ( ) Right ( ) Left ( ) Both ( )     Jaw posture  (At rest, look for symmetrical issues):  Closed (x) Clenched ( ) Open ( )     Jaw ROM  (“Please open your mouth as wide as you can, now please close your mouth, pretend to chew gum”):  Adequate (x) Reduced ( ) Severity:     Absent ( )     Jaw deviation on opening:  Yes ( ) No (x) If yes: Right ( ) Left ( ) Severity:     Jaw strength (opening)  (“I’m going to place my hand on the back of your head and under your chin. Please open your mouth while I provide resistance”.)  Adequate (x) Reduced ( ) Severity:       Absent ( )      Jaw strength (closing)  (Now, please close your mouth while I provide resistance):  Adequate (x) Reduced ( ) Severity:        Absent ( )      Jaw strength (lateralization)  (Please move your lower jaw from side to side)  Adequate  (x) Reduced ( ) Severity:        Absent ( )     Jaw opening/closing speed:  Adequate (x) Reduced ( ) Severity:     Adventitious movements: Present ( ) Absent (x)  Location/Description:  Other Observations:      Cranial Nerve VII (Facial)     Face symmetry at rest:  Yes (x) No ( ) Right ( ) Left ( ) Both ( ) Upper ( ) Bottom ( ) Both ( )     Face symmetry during movement:  Yes (x) No ( ) Right ( ) Left ( ) Both ( ) Upper ( ) Bottom ( ) Both ( )     Facial expression:  Adequate (x) Expressionless/masked ( )  Comments:      Eyelid closure ROM  (“Please close your eyes tightly”):  Adequate (x) Reduced ( ) Right ( ) Left ( ) Absent ( ) Severity (if reduced):     Eyelid closure strength  (“Please keep your eyes closed tightly while I try to open them”)  Adequate (x) Reduced ( ) Right ( ) Left ( ) Absent ( ) Severity (if reduced):     Eyebrow ROM  (“Please raise your eyebrows”)  Adequate (x) Reduced ( ) Right ( ) Left ( ) Absent ( ) Severity (if reduced):     Eyebrow strength  (“Raise your eyebrows and don’t let me lower them”)  Adequate (x) Reduced ( ) Right ( ) Left ( ) Absent ( ) Severity (if reduced):     Lip condition:  Healthy (x) Dry ( ) Right ( ) Left ( ) Top ( ) Bottom ( ) Lesions ( ) Right ( ) Left ( ) Top ( ) Bottom ( ) Location/Description:     Lip ROM retraction  (“Please give me a big smile”)  Adequate (x) Reduced ( ) Right ( ) Left ( ) Absent ( ) Top ( ) Bottom ( ) Severity (if  reduced):      Lip ROM protrusion  (“Now try and pucker your lips”)  Adequate (x) Reduced ( ) Right ( ) Left ( ) Absent ( ) Top ( ) Bottom ( ) Severity (if  reduced):      Lip strength retraction  (“Now smile and don’t let me push your lips together”)  Adequate (x) Reduced ( ) Right ( ) Left ( ) Absent ( ) Top ( ) Bottom ( ) Severity (if  reduced):      Lip strength protrusion  (“Pucker and don’t let me pull your lips apart”)  Adequate (x) Reduced ( ) Right ( ) Left ( ) Absent ( ) Top ( ) Bottom ( ) Severity (if  reduced):      Lip  speed  (“Now go back and forth between a smile and a pucker as fast as you can”)   Adequate (x) Reduced ( ) Severity:      Wrinkle chin  (“Please stick out your lower lip (or try and wrinkle your chin”)  Adequate (x) Reduced ( ) Right ( ) Left ( ) Absent ( )   Severity (if reduced):      Adventitious movements: Present ( ) Absent (x)  Location/Description:   Other Observations:     Cranial Nerve IX/X (Glossopharyngeal/Vagus)  Palatal symmetry (rest): Adequate (x) Reduced ( ) Right ( ) Left ( )     Palatal symmetry (phonation)  (“Say “ahhhhh”)  Adequate (x) Reduced ( ) Right ( ) Left ( ) Absent ( )   Severity (if reduced):        Resonance (during speech):  Normal (?) Hypernasal ( ) Hyponasal ( ) Mixed ( ) Cul de sac ( ) Nasal air emission ( ) Other Observations:     Cranial Nerve X (Vagus)     Glottal coup:  Adequate (?) Reduced ( ) Absent ( )      Cough: Adequate (x) Reduced ( ) Absent ( )      Loudness: Appropriate (x) Inappropriate ( ) If inappropriate, Louder ( ) Softer ( ) Monoloudness ( ) Decay ( ) Fluctuating ( )      Pitch: Appropriate (x) Inappropriate ( )  If inappropriate: Higher ( ) Lower ( ) Breaks ( ) Diplophonia ( ) Monopitch ( )     Vocal quality: WFL (x) Arrests/stops ( ) Breathy (?) Glottal wilson ( ) Rough/Hoarse ( ) Spastic (strained-strangled) ( )   Tremor ( ) Other:     Cranial Nerve XI (Accessory)     Neck symmetry: Yes ( ) No (x) Right ( ) Left ( ) Both ( )     Turn head ROM  (“Please turn your head from side to side”)  Adequate (x) Reduced ( ) Right ( ) Left ( ) Absent ( )   Severity (if reduced):      Turn head strength  (“Turn your head to the right, don’t let me push it back; now to the left, don’t let me push it back”)  Adequate (x) Reduced ( ) Right ( ) Left ( ) Absent ( ) Severity (if reduced):     Shoulder shrug ROM  (“Please shrug your shoulders”)  Adequate (x) Reduced ( ) Right ( ) Left ( ) Absent ( ) Severity (if reduced):     Shoulder shrug strength  (“Shrug your shoulders and don’t  let me push them down”)  Adequate (x) Reduced ( ) Right ( ) Left ( ) Absent ( ) Severity (if reduced):     Adventitious movements: Present ( ) Absent (x)   Location/Description:  Other Observations:      Cranial Nerve XII (Hypoglossal)     Lingual condition:  Normal (x) Enlarged ( ) Discolored ( ) Small ( ) Geographic ( ) Hairy ( ) Fissure ( )  Tongue tie ( ) Lesion ( ) Right ( ) Left ( ) Both ( )   Atrophy ( ) Right ( ) Left ( ) Both ( )  Fasciculations ( ) Right ( ) Left ( ) Both ( )  Location/Description:     Lingual protrusion  (“Stick out your tongue as far as you can”)  Adequate (x) Reduced ( ) Right ( ) Left ( ) Absent ( )   Severity (if reduced):      Lingual ROM  (“Move your tongue from side to side”)  Adequate (x) Reduced ( ) Right ( ) Left ( ) Absent ( )   Severity (if reduced):      Lingual speed: Adequate (x) Reduced ( ) Severity:      Lingual strength  (“Please move your tongue over to one side, don’t let me push it back (with tongue depressor), now to the other side, don’t let me push it back”)  Adequate (x) Reduced ( ) Right ( ) Left ( ) Absent ( )   Severity (if reduced):     Adventitious movements: Present ( ) Absent (x)   Location/Description:  Other Observations:     Alternating Motion Rates (AMRs):  Directions: Ask the patient to take a deep breath and say “p?-p?-p?” as long and as fast and as evenly as they can. Count syllables per second. (6 seconds)  /p?/ syllables per second: 17/3.5s = 4.86 syllables/second  Is AMR WFL? N     Directions: Ask the patient to take a deep breath and say “t?-t?-t?” as long and as fast and as evenly as they can. Count syllables per second. (6 seconds)  /t?/ syllables per second: 21/5.6s = 3.75 syllables/second  Is AMR WFL? Y/N     Directions: Ask the patient to take a deep breath and say “k?-k?-k?” as long and as fast and as evenly as they can. Count syllables per second. (8 seconds)  /k?/ syllables per second: 16/4.1s = 3.9 syllables/second   Is AMR WFL? N    "  AMR Normative data for 74-86 year old females:  /p?/   5.4-8.1 syllables per second   /t?/  3.6-8.2  syllables per second   /k?/  3.5-7.2 syllables per second     Sequential Motion Rates (SMRs):  Directions: Ask the patient to say the previous three sounds (“p?-t?-k?”) together as fast and evenly as they can. (If they cannot say pataka, you may use “buttercup”). Count the syllables per second.   /p?-t?-k?/ syllables per second: 42/6.7s = 6.26 syllables/second  \"Buttercup\":   Is SMR WFL? Y     SMR Normative data for 74-86 year old females:  /p?t?k?/   2.5-9.0         Maximum duration of sustained phonation of /a/.  Directions: “Say ‘ah’ for as long as you can at a comfortable pitch and loudness”.  Record duration in seconds and sound pressure level (SPL) throughout the vowel.  SPL Meter to Mouth distance: 30 cm     Trial Duration (seconds) dB SPL   1. 11 79.5   2. 15 78.5   3. 15 81, 80, 80, 81, 79   4. 14 80.8, 80.5, 80, 79.9, 80.5, 80, 79.9, 80.5   5. 16 80.3, 80.9, 80.5, 80.3, 80.9, 80.6   Comments: Mrs. Huff demonstrated vocal flutters during the end of each trial. Mrs. Huff did not use all of her air during the attempts, she finished phonating then exhaled the rest of her air on 5/5 of the attempts.      Average secs:  14.2    Range secs:  11-16 Average dB:  80.21 Range dB  78.5-80.9   Comments: During spontaneous conversation with no clinician cues and unstructured questions, Mrs. Huff's vocal intensity ranged from 46.3-60 dB.           Video Protocol for SPEAK OUT!  Set the video recording equipment no more than 5 feet from the patient. Use the string on the tripod to ensure you are within the correct distance. Make sure the patient is centered in the frame and that all the equipment is functional and ready to record.   Follow the attached protocol questions, in order, from the [facility]. (See attached script).    What is your full name?  \"Alla Huff.\"  When were you " "diagnosed with Parkinsons?  \"2017.\"  Have you noticed any changes in your speech?  \"I noticed I get a frog in my throat a lot during the day.\"  What changes have you noticed?    Has anyone said anything to you about your speech?  \"No, I've always been one to talk pretty quickly.\"  What have they said?    Have you noticed any changes in your swallowing?  \"Yes.\"  What have you noticed?  \"I just have to focus more on it sometimes to swallow. If I involved in a conversation while I'm eating, I will choke. So I have to pay attention to what I'm doing. It was automatic before.\"   Say “AH” for as long as you can.  15 seconds at 70.3 dB.   Count from 1 to 15.  Completed.   Read “The Grandfather Passage.”    Completed. (45-61 dB range)     Probe: Now show the patient the video you recorded on the video recorder (show them on the recorder itself) and start doing some stimulability testing. (see attached phrases)     Task 1: Repeat maximum sustained phonation and cue the patient to use “intent”. Note any differences below.  Task 2: Have the patient read the attached phrases aloud and cue to use “intent”. Note any differences below.      SPEAKOUT! STIMULABILITY TESTING:  Task Intensity (dB) Seconds (if applicable)   Phrase readin-73 dB (range)     Sustained phonation /a/:  71.28  12 seconds      Stimulus phrases used:  Hello.  Can you help me?  No, thank you.  Welcome home.  Get the phone!  Stop by again!  Have a nice day.  I love you!     Improvements noted:  No improvement     Breath support for  speech (improved posture)     Articulation Vocal intensity        Intonation of  speech    Fluency        Facial  expression    Rate of speech     Vocal quality (hoarseness decreased)     Intelligibility   Patients response to using \"intent:\": The patient exhibited increased vocal intensity, improved posture, decreased hoarseness, and a more open oral cavity during sustained phonation exercises using \"intent\".       "  IMPRESSIONS:   Dysarthria   type:    Hypokinetic       Other:       Dysarthria Severity:       WFL     Mild     Moderate     Severe     Profound   Characterized by:    Reduced vocal intensity      Dysfluency      Breathy / hoarse vocal quality      Glottal wilson      Imprecise articulation      Rapid / slow rate of speech      Reduced breath support for speech       Monopitch / monoloudness      Sensory deficit ( mild / mod / severe /   profound )      Other     Prognosis for improvement:     Good      Guarded due to:      Poor due to:        RECOMMENDATIONS:   SPEAK OUT!® Therapy        (9 individual sessions, 3 times per week)     Attend Learn About Parkinson’s Webinar     ENT evaluation     Modified barium swallow study or monitor swallowing     Home Practice six SPEAK OUT! exercises: ___1x _x__ 2x daily   ___ online sessions __x_ with workbook     Other:        Assessments  Prognosis and Recommendations:  Impression Summary:   Mrs. Huff presented with a hoarse voice quality during conversational tasks. She also displayed a hoarse voice quality and vocal flutter during sustained phonation. It is recommended that Mrs. Huff undergo stroboscopy to assess vocal fold health and further explore vocal quality.   Mrs. Huff reported that her pills feel stuck in her esophagus (at the level of the sternum) during swallowing, a GI referral is recommended to explore possible esophageal/reflux dysfunction.   Mrs. Huff was invited to the Parkinson's Voice Project [place] and expressed interest in participating, she received a passing score (i.e., 30) on the Mini-Mental Status Exam (MMSE) and will be enrolled in the Project.   Mrs. Huff will give the Voice Handicap Index  form to her  and bring it in next appointment.   Although Mrs. Huff self-rated her Voice Handicap Index (VHI) within normal parameters, and scored within functional limits when measuring vocal intensity during the  sustained vowel tasks, clinicians noted significant reduced vocal intensity in spontaneous and off-the cuff answers to questions.       Speech Therapy Plan  LONG TERM GOAL:   Produce spontaneous conversation while using intent independently or with appropriate supports.     SHORT TERM GOALS:  1. Patient/family will accurately demonstrate the six SPEAK OUT! Exercises.  2. Patient/family will establish a daily SPEAK OUT! home practice routine.  3. Patient will demonstrate how to “speak with intent.”  4. Care Partner will demonstrate best cue to elicit “intentional speech” from the patient.  5. Patient will successfully transition to a SPEAK OUT! Therapy Group.       Mrs. Huff opted to enroll in Parkinson's Voice Project research study with Raisa Shah MS (Niki), CCC-SLP. She will be seen for a Modified Barium Swallow Study (MBSS), SPEAK OUT! Therapy, followed by another MBSS at the end of the 3 week SPEAK OUT! treatment.       Functional Assessment Used: auditory perceptual evaluation, Mini-Mental Status Exam (MMSE), video recording, research assessment        Referring provider co-signature:  I have reviewed this plan of care and my co-signature certifies the need for services.    Certification Period: 02/18/2025 to      Physician Signature: ________________________________ Date: ______________       [x] As the licensed therapist supervising this student, I was present during the entire treatment session directing the care and reviewing the assessment plan.  I reviewed all documentation prior to signing.

## 2025-02-19 DIAGNOSIS — R13.10 DYSPHAGIA, UNSPECIFIED TYPE: ICD-10-CM

## 2025-02-20 ENCOUNTER — HOSPITAL ENCOUNTER (OUTPATIENT)
Dept: RADIOLOGY | Facility: MEDICAL CENTER | Age: 65
End: 2025-02-20
Attending: CHIROPRACTOR
Payer: COMMERCIAL

## 2025-02-20 DIAGNOSIS — M50.020 CERVICAL DISC DISORDER WITH MYELOPATHY OF MID-CERVICAL REGION: ICD-10-CM

## 2025-02-20 PROCEDURE — 72141 MRI NECK SPINE W/O DYE: CPT

## 2025-02-27 ENCOUNTER — HOSPITAL ENCOUNTER (OUTPATIENT)
Dept: RADIOLOGY | Facility: MEDICAL CENTER | Age: 65
End: 2025-02-27
Attending: INTERNAL MEDICINE
Payer: COMMERCIAL

## 2025-02-27 DIAGNOSIS — R13.10 DYSPHAGIA, UNSPECIFIED TYPE: ICD-10-CM

## 2025-02-27 PROCEDURE — 74230 X-RAY XM SWLNG FUNCJ C+: CPT

## 2025-03-12 ENCOUNTER — TELEPHONE (OUTPATIENT)
Dept: OPHTHALMOLOGY | Facility: MEDICAL CENTER | Age: 65
End: 2025-03-12
Payer: COMMERCIAL

## 2025-03-13 ENCOUNTER — OFFICE VISIT (OUTPATIENT)
Dept: OPHTHALMOLOGY | Facility: MEDICAL CENTER | Age: 65
End: 2025-03-13
Payer: COMMERCIAL

## 2025-03-13 DIAGNOSIS — H40.003 GLAUCOMA SUSPECT OF BOTH EYES: ICD-10-CM

## 2025-03-13 DIAGNOSIS — G20.A2 PARKINSON'S DISEASE WITHOUT DYSKINESIA, WITH FLUCTUATING MANIFESTATIONS (HCC): ICD-10-CM

## 2025-03-13 DIAGNOSIS — H53.9 VISUAL DISTURBANCE: ICD-10-CM

## 2025-03-13 PROCEDURE — 92083 EXTENDED VISUAL FIELD XM: CPT | Performed by: STUDENT IN AN ORGANIZED HEALTH CARE EDUCATION/TRAINING PROGRAM

## 2025-03-13 PROCEDURE — 92250 FUNDUS PHOTOGRAPHY W/I&R: CPT | Performed by: STUDENT IN AN ORGANIZED HEALTH CARE EDUCATION/TRAINING PROGRAM

## 2025-03-13 PROCEDURE — 99203 OFFICE O/P NEW LOW 30 MIN: CPT | Mod: 25 | Performed by: STUDENT IN AN ORGANIZED HEALTH CARE EDUCATION/TRAINING PROGRAM

## 2025-03-13 ASSESSMENT — CUP TO DISC RATIO
OS_RATIO: 0.5
OD_RATIO: 0.5

## 2025-03-13 ASSESSMENT — VISUAL ACUITY
OS_CC: 20/20
OS_SC: J1
OD_CC: 20/20
CORRECTION_TYPE: GLASSES
METHOD: SNELLEN - LINEAR
OD_SC: J1

## 2025-03-13 ASSESSMENT — REFRACTION_WEARINGRX
OD_AXIS: 073
OS_AXIS: 061
OS_CYLINDER: +0.75
OS_SPHERE: -1.75
SPECS_TYPE: SVL
OD_CYLINDER: +1.25
OD_SPHERE: -3.00

## 2025-03-13 ASSESSMENT — SLIT LAMP EXAM - LIDS
COMMENTS: NORMAL
COMMENTS: NORMAL

## 2025-03-13 ASSESSMENT — REFRACTION_MANIFEST
OD_CYLINDER: +1.75
OS_SPHERE: -2.00
OD_AXIS: 089
OD_SPHERE: -3.50
OS_AXIS: 074
OS_CYLINDER: +0.75

## 2025-03-13 ASSESSMENT — CONF VISUAL FIELD
OS_INFERIOR_NASAL_RESTRICTION: 0
OD_NORMAL: 1
OD_INFERIOR_TEMPORAL_RESTRICTION: 0
OS_INFERIOR_TEMPORAL_RESTRICTION: 0
OD_SUPERIOR_NASAL_RESTRICTION: 0
OD_SUPERIOR_TEMPORAL_RESTRICTION: 0
OD_INFERIOR_NASAL_RESTRICTION: 0
OS_SUPERIOR_NASAL_RESTRICTION: 0
OS_NORMAL: 1
OS_SUPERIOR_TEMPORAL_RESTRICTION: 0

## 2025-03-13 ASSESSMENT — EXTERNAL EXAM - LEFT EYE: OS_EXAM: NORMAL

## 2025-03-13 ASSESSMENT — TONOMETRY
OD_IOP_MMHG: 17
OS_IOP_MMHG: 16

## 2025-03-13 ASSESSMENT — EXTERNAL EXAM - RIGHT EYE: OD_EXAM: NORMAL

## 2025-03-13 NOTE — PROGRESS NOTES
Peds/Neuro Ophthalmology:   Jesse Ramsey M.D.    Date & Time note created:    3/13/2025   3:48 PM     Referring MD / APRN:  Abi Bragg M.D., No att. providers found    Patient ID:  Name:             Mariela uHff   YOB: 1960  Age:                 64 y.o.  female   MRN:               [id number]    Chief Complaint/Reason for Visit:     Other (Visual disturbace)      History of Present Illness:      Mariela Huff is a 63 yo woman who presents for new patient evaluation of double vision     She is referred by Dr Santiago for double vision with lights. She has a history of parkinsons managed on carbidopa/levodopa 25/100 and trihexyphenydyl 2mg.     She endorses smearing of images at night when looking at lights. This appears to be occurring in both eyes equally. She otherwise denies any double vision, blurred vision. She endorses symptoms grittiness and discomfort in both eyes by the evening.She will occasionally use artificial tears. Dr Alonso's offices one year ago         Review of Systems:  ROS    Past Medical History:   Past Medical History:   Diagnosis Date    Anesthesia     severe ponv, hard to wake up    Arthritis     shoulders    Chest tightness     Cough     Diverticulosis 2012    pt stated it was noted on colonoscopy    Hypothyroid     hypothyroid    Infectious disease     around bronchitis/flu 12/2011    Lyme disease     chronic    Pain 04/2018    right foot, bilateral shoulder    Painful breathing     Shortness of breath        Past Surgical History:  Past Surgical History:   Procedure Laterality Date    PB SHLDR ARTHROSCOP,SURG,W/ROTAT CUFF REPB Right 12/14/2022    Procedure: RIGHT SHOULDER ARTHROSCOPY ROTATOR CUFF REPAIR;  Surgeon: Mildred Hu M.D.;  Location: [facility] [place];  Service: Orthopedics    PB REPAIR BICEPS LONG TENDON Right 12/14/2022    Procedure: RIGHT BICEPS TENODESIS;  Surgeon: Mildred Hu M.D.;  Location: [facility];  Service: Orthopedics    OH SHLDR ARTHROSCOP,PART ACROMIOPLAS Right 12/14/2022    Procedure: RIGHT SUBACROMIAL DECOMPRESSION, LABRAL DEBRIDEMENT, REPAIRS AS INDICATED;  Surgeon: Mildred Hu M.D.;  Location: [facility] [place];  Service: Orthopedics    TOE FUSION Right 5/7/2018    Procedure: TOE FUSION - 1ST MTP;  Surgeon: Janes Benavidez M.D.;  Location: SURGERY SAME DAY [facility];  Service: Orthopedics    REPAIR, TENDON, LOWER EXTREMITY, USING TENDON GRAFT Right 5/7/2018    Procedure: FLEXOR TENDON REPAIR - 4TH RELEASE W/SOFT TISSUE RELEASE 2/3;  Surgeon: Janes Benavidez M.D.;  Location: SURGERY SAME DAY [facility];  Service: Orthopedics    HARDWARE REMOVAL ORTHO Right 5/7/2018    Procedure: HARDWARE REMOVAL ORTHO - INTERNAL FIXATION;  Surgeon: Janes Benavidez M.D.;  Location: SURGERY SAME DAY [facility];  Service: Orthopedics    ORTHOPEDIC OSTEOTOMY Right 5/7/2018    Procedure: ORTHOPEDIC OSTEOTOMY - DISTAL METATARSAL 2/3/4;  Surgeon: Janes Benavidez M.D.;  Location: SURGERY SAME DAY [facility];  Service: Orthopedics    BUNIONECTOMY Right 5/1/2017    Procedure: BUNIONECTOMY - PROXIMAL HALLUX VALGUS CORRECTION W/BONE GRAFT;  Surgeon: Janes Benavidez M.D.;  Location: SURGERY [facility];  Service:     NEUROMA EXCISION  5/1/2017    Procedure: NEUROMA EXCISION - 2ND WEBSPACE;  Surgeon: Janes Benavidez M.D.;  Location: SURGERY [facility];  Service:     BREAST IMPLANT REVISION Bilateral 4/11/2016    Procedure: BREAST IMPLANT EXCHANGE OF SALINE TO SILICONE W/REPOSITIONING OF LATERAL FOLDS;  Surgeon: Mikey Adkins M.D.;  Location: SURGERY [facility];  Service:     BREAST BIOPSY  1/24/2012    Performed by SANDRA ESTRADA at SURGERY SAME DAY [facility]    SHOULDER ARTHROSCOPY  12/1/2010    Performed by KATE CHANCE at [facility]    SHOULDER DECOMPRESSION  12/1/2010    Performed by KATE CHANCE at [facility]    ROTATOR  CUFF REPAIR  12/1/2010    Performed by KATE CHANCE at SURGERY [facility]    SHOULDER DECOMPRESSION ARTHROSCOPIC  5/13/2009    Performed by KATE CHANCE at SURGERY [facility]    ROTATOR CUFF REPAIR  5/13/2009    Performed by KATE CHANCE at SURGERY [facility]    HYSTERECTOMY, VAGINAL  1999    fibroids    MI BREAST AUGMENTATION WITH IMPLANT Bilateral 1999    OTHER ORTHOPEDIC SURGERY  1993    joint repair left thumb    US-NEEDLE CORE BX-BREAST PANEL         Current Outpatient Medications:  Current Outpatient Medications   Medication Sig Dispense Refill    NON SPECIFIED He Peptic Support - Licorice extract, L-glutamine, mstic gum, citrus fruit extract, bismuth, gamma oryzanol, chlorella, chinese rhubarb extract      NON SPECIFIED Palmitoylethanolamide + luteolin      NON SPECIFIED Pyloricil - zinc, bismuth, mastic gum, berberine      trihexyphenidyl (ARTANE) 2 MG Tab Take 0.5 Tablets by mouth 3 times a day. 135 Tablet 3    levothyroxine (SYNTHROID) 75 MCG Tab TAKE 1 TABLET 6 DAYS A WEEK ON AN EMPTY STOMACH 72 Tablet 3    carbidopa-levodopa (SINEMET)  MG Tab Take 1 Tablet by mouth 3 times a day for 360 days. 270 Tablet 3    famotidine (PEPCID) 20 MG Tab TAKE 1 TABLET BY MOUTH TWICE A  tablet 0    Cyanocobalamin (B-12) 3000 MCG SL Tab Place 1 Tab under tongue.      Ascorbic Acid (VITAMIN C) 500 MG Chew Tab Take 500 mg by mouth every day.      Cholecalciferol (VITAMIN D) 50 MCG (2000 UT) Cap Take 1 Cap by mouth.       No current facility-administered medications for this visit.       Allergies:  Allergies   Allergen Reactions    Pcn [Penicillins] Rash     Rash      Percocet [Oxycodone-Acetaminophen] Vomiting    Symbicort [Budesonide-Formoterol Fumarate]      Laryngitis        Family History:  Family History   Problem Relation Age of Onset    Diabetes Mother     Thyroid Mother     Glasses Mother     Other Problem (macular degeneration) Mother     Glasses Father     Diabetes Father      Thyroid Sister     Cancer Paternal Aunt         breast    Cancer Paternal Aunt         breast, uterine    Cancer Paternal Aunt         bladder    Cancer Paternal Aunt         colon    Thyroid Daughter     Heart Disease Neg Hx     Stroke Neg Hx     Alcohol/Drug Neg Hx        Social History:  Social History     Socioeconomic History    Marital status:      Spouse name: Not on file    Number of children: Not on file    Years of education: Not on file    Highest education level: Bachelor's degree (e.g., BA, AB, BS)   Occupational History    Not on file   Tobacco Use    Smoking status: Never    Smokeless tobacco: Never   Vaping Use    Vaping status: Never Used   Substance and Sexual Activity    Alcohol use: Yes     Alcohol/week: 0.6 oz     Types: 1 Glasses of wine per week     Comment: Occasionally    Drug use: No    Sexual activity: Yes     Partners: Male   Other Topics Concern    Not on file   Social History Narrative    Retired     Social Drivers of Health     Financial Resource Strain: Low Risk  (1/12/2021)    Overall Financial Resource Strain (CARDIA)     Difficulty of Paying Living Expenses: Not hard at all   Food Insecurity: No Food Insecurity (1/12/2021)    Hunger Vital Sign     Worried About Running Out of Food in the Last Year: Never true     Ran Out of Food in the Last Year: Never true   Transportation Needs: No Transportation Needs (1/12/2021)    PRAPARE - Transportation     Lack of Transportation (Medical): No     Lack of Transportation (Non-Medical): No   Physical Activity: Sufficiently Active (1/12/2021)    Exercise Vital Sign     Days of Exercise per Week: 5 days     Minutes of Exercise per Session: 50 min   Stress: No Stress Concern Present (1/12/2021)    [employer] - Occupational Stress Questionnaire     Feeling of Stress : Not at all   Social Connections: Unknown (1/12/2021)    Social Connection and Isolation Panel [NHANES]     Frequency of Communication with  Friends and Family: More than three times a week     Frequency of Social Gatherings with Friends and Family: Once a week     Attends Judaism Services: Patient declined     Active Member of Clubs or Organizations: Yes     Attends Club or Organization Meetings: More than 4 times per year     Marital Status:    Intimate Partner Violence: Not on file   Housing Stability: Low Risk  (1/12/2021)    Housing Stability Vital Sign     Unable to Pay for Housing in the Last Year: No     Number of Places Lived in the Last Year: 1     Unstable Housing in the Last Year: No          Physical Exam:  Physical Exam    Oriented x 3  Weight/BMI: There is no height or weight on file to calculate BMI.  There were no vitals taken for this visit.    Base Eye Exam       Visual Acuity (Snellen - Linear)         Right Left    Dist cc 20/20 20/20    Near sc J1 J1      Correction: Glasses   Smearing of images resolves with pinhole OU             Tonometry (1:21 PM)         Right Left    Pressure 17 16              Pupils         Pupils Dark Light Shape React APD    Right PERRL 4 3 Round Brisk None    Left PERRL 4 3 Round Brisk None              Visual Fields         Right Left     Full Full              Extraocular Movement         Right Left     Full Full              Neuro/Psych       Oriented x3: Yes    Mood/Affect: Normal                  Additional Tests       Color         Right Left    [place] 9/9 9/9              Stereo       Fly: +    Animals: 3/3    Circles: 9/9                  Strabismus Exam       Method: Alternate cover    Correction: cc      Distance Near Near +3DS N Bifocals     Ortho                  - - - - - -  Ortho  - - - - - -                      Ortho  - -  - -  Ortho  - -  - -  Ortho                      - - - - - -  Ortho  - - - - - -                       Slit Lamp and Fundus Exam       External Exam         Right Left    External Normal Normal              Slit Lamp Exam         Right Left    Lids/Lashes  Normal Normal    Conjunctiva/Sclera White and quiet White and quiet    Cornea Clear Clear    Anterior Chamber Deep and quiet Deep and quiet    Iris Round and reactive Round and reactive    Lens Trace Nuclear sclerosis Trace Nuclear sclerosis              Fundus Exam         Right Left    Disc pink, sharp disc margins, flat pink, sharp disc margins, flat    C/D Ratio 0.5 0.5    Macula Normal Normal                  Refraction       Wearing Rx         Sphere Cylinder Axis    Right -3.00 +1.25 073    Left -1.75 +0.75 061      Age: 1yr    Type: SVL              Manifest Refraction         Sphere Cylinder Axis    Right -3.50 +1.75 089    Left -2.00 +0.75 074                    Pertinent Lab/Test/Imaging Review:  MRI brain wo 9/7/23  Unremarkable noncontrast MR examination of the brain except for a punctate left frontal subcortical T2 hyperintensity of no clinical significance.     MRA neck wo 9/7/23  There is no flow-limiting stenosis in the visualized extracranial neck arteries.  Assessment and Plan:     Parkinson's disease without dyskinesia, with fluctuating manifestations  Follows with Dr Santiago  Diagnosed 2017  Meds: carbidopa/levodopa 25/100 and trihexyphenydyl 2mg.     Visual disturbance  63 yo woman who presents for new patient evaluation of visual disturbances    Reports smearing of lights at night. Otherwise no blurred vision, double vision. +grittiness and discomfort in both eyes.     Exam 3/13/25: no APD, VA  20/20 OD and OS, color plates 9/9 OD and OS, normal HVF OU. EOM full, ortho alignment. Smeared lights eliminated with pinhole OU. Optic nerves pink with sharp disc margins. RNFL 80 OD 77 OS    Plan:   Pt demonstrates a normal afferent and efferent visual exam. The smeared effect on lights in the dark is resolved with pinhole, suggesting surface etiology. Pt endorses dry eye symptoms of grittiness and discomfort, which is likely exacerbated by her decreased blink rate with parkinson's. Recommended  increased use of artificial tears. Pt to return prn     Glaucoma suspect of both eyes  IOP 17 OD 16 OS  CD 0.5 OU   RNFL within normal limits, although mild sup thinning OD noted.   Continue to monitor with regular eye exams        Jesse Ramsey M.D.    40 total minutes were spent reviewing imaging, records, examining the patient and documenting.

## 2025-03-13 NOTE — ASSESSMENT & PLAN NOTE
Follows with Dr Santiago  Diagnosed 2017  Meds: carbidopa/levodopa 25/100 and trihexyphenydyl 2mg.

## 2025-03-13 NOTE — ASSESSMENT & PLAN NOTE
IOP 17 OD 16 OS  CD 0.5 OU   RNFL within normal limits, although mild sup thinning OD noted.   Continue to monitor with regular eye exams

## 2025-03-13 NOTE — ASSESSMENT & PLAN NOTE
63 yo woman who presents for new patient evaluation of visual disturbances    Reports smearing of lights at night. Otherwise no blurred vision, double vision. +grittiness and discomfort in both eyes.     Exam 3/13/25: no APD, VA  20/20 OD and OS, color plates 9/9 OD and OS, normal HVF OU. EOM full, ortho alignment. Smeared lights eliminated with pinhole OU. Optic nerves pink with sharp disc margins. RNFL 80 OD 77 OS    Plan:   Pt demonstrates a normal afferent and efferent visual exam. The smeared effect on lights in the dark is resolved with pinhole, suggesting surface etiology. Pt endorses dry eye symptoms of grittiness and discomfort, which is likely exacerbated by her decreased blink rate with parkinson's. Recommended increased use of artificial tears. Pt to return prn

## 2025-03-13 NOTE — PROGRESS NOTES
Peds/Neuro Ophthalmology:   Calvin Guerra    Date & Time note created:    3/13/2025   1:23 PM     Referring MD / APRN:  Abi Bragg M.D., No att. providers found    Patient ID:  Name:             Mariela Huff   YOB: 1960  Age:                 64 y.o.  female   MRN:               [id number]    Chief Complaint/Reason for Visit:     Other (Visual disturbace)      History of Present Illness:    Mariela Huff is a 64 y.o. female   Other        Review of Systems:  Review of Systems   Eyes:         Visual diturbance   All other systems reviewed and are negative.      Past Medical History:   Past Medical History:   Diagnosis Date    Anesthesia     severe ponv, hard to wake up    Arthritis     shoulders    Chest tightness     Cough     Diverticulosis 2012    pt stated it was noted on colonoscopy    Hypothyroid     hypothyroid    Infectious disease     around bronchitis/flu 12/2011    Lyme disease     chronic    Pain 04/2018    right foot, bilateral shoulder    Painful breathing     Shortness of breath        Past Surgical History:  Past Surgical History:   Procedure Laterality Date    PB SHLDR ARTHROSCOP,SURG,W/ROTAT CUFF REPB Right 12/14/2022    Procedure: RIGHT SHOULDER ARTHROSCOPY ROTATOR CUFF REPAIR;  Surgeon: Mlidred Hu M.D.;  Location: [facility];  Service: Orthopedics    PB REPAIR BICEPS LONG TENDON Right 12/14/2022    Procedure: RIGHT BICEPS TENODESIS;  Surgeon: Mildred Hu M.D.;  Location: [facility];  Service: Orthopedics    [place] SHLDR ARTHROSCOP,PART ACROMIOPLAS Right 12/14/2022    Procedure: RIGHT SUBACROMIAL DECOMPRESSION, LABRAL DEBRIDEMENT, REPAIRS AS INDICATED;  Surgeon: Mildred Hu M.D.;  Location: [facility];  Service: Orthopedics    TOE FUSION Right 5/7/2018    Procedure: TOE FUSION - 1ST MTP;  Surgeon: Janes Benavidez M.D.;  Location: SURGERY SAME DAY [facility];  Service:  Orthopedics    REPAIR, TENDON, LOWER EXTREMITY, USING TENDON GRAFT Right 5/7/2018    Procedure: FLEXOR TENDON REPAIR - 4TH RELEASE W/SOFT TISSUE RELEASE 2/3;  Surgeon: Janes Benavidez M.D.;  Location: SURGERY SAME DAY [facility];  Service: Orthopedics    HARDWARE REMOVAL ORTHO Right 5/7/2018    Procedure: HARDWARE REMOVAL ORTHO - INTERNAL FIXATION;  Surgeon: Janes Benavidez M.D.;  Location: SURGERY SAME DAY [facility];  Service: Orthopedics    ORTHOPEDIC OSTEOTOMY Right 5/7/2018    Procedure: ORTHOPEDIC OSTEOTOMY - DISTAL METATARSAL 2/3/4;  Surgeon: Janes Benavidez M.D.;  Location: SURGERY SAME DAY [facility];  Service: Orthopedics    BUNIONECTOMY Right 5/1/2017    Procedure: BUNIONECTOMY - PROXIMAL HALLUX VALGUS CORRECTION W/BONE GRAFT;  Surgeon: Janes Benavidez M.D.;  Location: SURGERY [facility];  Service:     NEUROMA EXCISION  5/1/2017    Procedure: NEUROMA EXCISION - 2ND WEBSPACE;  Surgeon: Janes Benavidez M.D.;  Location: SURGERY [facility];  Service:     BREAST IMPLANT REVISION Bilateral 4/11/2016    Procedure: BREAST IMPLANT EXCHANGE OF SALINE TO SILICONE W/REPOSITIONING OF LATERAL FOLDS;  Surgeon: Mikey Adkins M.D.;  Location: SURGERY [facility];  Service:     BREAST BIOPSY  1/24/2012    Performed by SANDRA ESTRADA at SURGERY SAME DAY [facility]    SHOULDER ARTHROSCOPY  12/1/2010    Performed by KATE CHANCE at SURGERY [facility]    SHOULDER DECOMPRESSION  12/1/2010    Performed by KATE CHANCE at SURGERY [facility]    ROTATOR CUFF REPAIR  12/1/2010    Performed by KATE CHANCE at SURGERY [facility]    SHOULDER DECOMPRESSION ARTHROSCOPIC  5/13/2009    Performed by KATE CHANCE at SURGERY [facility]    ROTATOR CUFF REPAIR  5/13/2009    Performed by KATE CHANCE at SURGERY [facility]    HYSTERECTOMY, VAGINAL  1999    fibroids    VA BREAST AUGMENTATION WITH IMPLANT Bilateral 1999    OTHER ORTHOPEDIC SURGERY  1993    joint repair left  thumb    US-NEEDLE CORE BX-BREAST PANEL         Current Outpatient Medications:  Current Outpatient Medications   Medication Sig Dispense Refill    NON SPECIFIED He Peptic Support - Licorice extract, L-glutamine, mstic gum, citrus fruit extract, bismuth, gamma oryzanol, chlorella, chinese rhubarb extract      NON SPECIFIED Palmitoylethanolamide + luteolin      NON SPECIFIED Pyloricil - zinc, bismuth, mastic gum, berberine      trihexyphenidyl (ARTANE) 2 MG Tab Take 0.5 Tablets by mouth 3 times a day. 135 Tablet 3    levothyroxine (SYNTHROID) 75 MCG Tab TAKE 1 TABLET 6 DAYS A WEEK ON AN EMPTY STOMACH 72 Tablet 3    carbidopa-levodopa (SINEMET)  MG Tab Take 1 Tablet by mouth 3 times a day for 360 days. 270 Tablet 3    famotidine (PEPCID) 20 MG Tab TAKE 1 TABLET BY MOUTH TWICE A  tablet 0    Cyanocobalamin (B-12) 3000 MCG SL Tab Place 1 Tab under tongue.      Ascorbic Acid (VITAMIN C) 500 MG Chew Tab Take 500 mg by mouth every day.      Cholecalciferol (VITAMIN D) 50 MCG (2000 UT) Cap Take 1 Cap by mouth.       No current facility-administered medications for this visit.       Allergies:  Allergies   Allergen Reactions    Pcn [Penicillins] Rash     Rash      Percocet [Oxycodone-Acetaminophen] Vomiting    Symbicort [Budesonide-Formoterol Fumarate]      Laryngitis        Family History:  Family History   Problem Relation Age of Onset    Diabetes Mother     Thyroid Mother     Glasses Mother     Other Problem (macular degeneration) Mother     Glasses Father     Diabetes Father     Thyroid Sister     Cancer Paternal Aunt         breast    Cancer Paternal Aunt         breast, uterine    Cancer Paternal Aunt         bladder    Cancer Paternal Aunt         colon    Thyroid Daughter     Heart Disease Neg Hx     Stroke Neg Hx     Alcohol/Drug Neg Hx        Social History:  Social History     Socioeconomic History    Marital status:      Spouse name: Not on file    Number of children: Not on file    Years  of education: Not on file    Highest education level: Bachelor's degree (e.g., BA, AB, BS)   Occupational History    Not on file   Tobacco Use    Smoking status: Never    Smokeless tobacco: Never   Vaping Use    Vaping status: Never Used   Substance and Sexual Activity    Alcohol use: Yes     Alcohol/week: 0.6 oz     Types: 1 Glasses of wine per week     Comment: Occasionally    Drug use: No    Sexual activity: Yes     Partners: Male   Other Topics Concern    Not on file   Social History Narrative    Retired     Social Drivers of Health     Financial Resource Strain: Low Risk  (1/12/2021)    Overall Financial Resource Strain (CARDIA)     Difficulty of Paying Living Expenses: Not hard at all   Food Insecurity: No Food Insecurity (1/12/2021)    Hunger Vital Sign     Worried About Running Out of Food in the Last Year: Never true     Ran Out of Food in the Last Year: Never true   Transportation Needs: No Transportation Needs (1/12/2021)    PRAPARE - Transportation     Lack of Transportation (Medical): No     Lack of Transportation (Non-Medical): No   Physical Activity: Sufficiently Active (1/12/2021)    Exercise Vital Sign     Days of Exercise per Week: 5 days     Minutes of Exercise per Session: 50 min   Stress: No Stress Concern Present (1/12/2021)    [place] [employer] - Occupational Stress Questionnaire     Feeling of Stress : Not at all   Social Connections: Unknown (1/12/2021)    Social Connection and Isolation Panel [NHANES]     Frequency of Communication with Friends and Family: More than three times a week     Frequency of Social Gatherings with Friends and Family: Once a week     Attends Jew Services: Patient declined     Active Member of Clubs or Organizations: Yes     Attends Club or Organization Meetings: More than 4 times per year     Marital Status:    Intimate Partner Violence: Not on file   Housing Stability: Low Risk  (1/12/2021)    Housing Stability Vital Sign      Unable to Pay for Housing in the Last Year: No     Number of Places Lived in the Last Year: 1     Unstable Housing in the Last Year: No          Physical Exam:  Physical Exam    Oriented x 3  Weight/BMI: There is no height or weight on file to calculate BMI.  There were no vitals taken for this visit.    Base Eye Exam       Visual Acuity (Snellen - Linear)         Right Left    Dist cc 20/20 20/20    Near sc J1 J1      Correction: Glasses              Tonometry (1:21 PM)         Right Left    Pressure 17 16              Pupils         Pupils    Right PERRL    Left PERRL                  Additional Tests       Color         Right Left    Ishihara 9/9 9/9              Stereo       Fly: +    Animals: 3/3    Circles: 9/9                  Refraction       Wearing Rx         Sphere Cylinder Axis    Right -3.00 +1.25 073    Left -1.75 +0.75 061      Age: 1yr    Type: SVL              Manifest Refraction         Sphere Cylinder Axis    Right -3.50 +1.75 089    Left -2.00 +0.75 074                    Pertinent Lab/Test/Imaging Review:      Assessment and Plan:     No problem-specific Assessment & Plan notes found for this encounter.        Calvin Guerra

## 2025-03-16 DIAGNOSIS — Z00.6 EXAM FOR CLINICAL RESEARCH: ICD-10-CM

## 2025-03-20 ENCOUNTER — HOSPITAL ENCOUNTER (OUTPATIENT)
Dept: RADIOLOGY | Facility: MEDICAL CENTER | Age: 65
End: 2025-03-20
Attending: INTERNAL MEDICINE
Payer: COMMERCIAL

## 2025-03-20 DIAGNOSIS — Z00.6 EXAM FOR CLINICAL RESEARCH: ICD-10-CM

## 2025-03-20 PROCEDURE — 74230 X-RAY XM SWLNG FUNCJ C+: CPT

## 2025-03-26 ENCOUNTER — OFFICE VISIT (OUTPATIENT)
Dept: MEDICAL GROUP | Facility: PHYSICIAN GROUP | Age: 65
End: 2025-03-26
Payer: COMMERCIAL

## 2025-03-26 VITALS
BODY MASS INDEX: 20.85 KG/M2 | WEIGHT: 106.2 LBS | HEIGHT: 60 IN | TEMPERATURE: 97.3 F | DIASTOLIC BLOOD PRESSURE: 62 MMHG | HEART RATE: 95 BPM | SYSTOLIC BLOOD PRESSURE: 104 MMHG | OXYGEN SATURATION: 96 %

## 2025-03-26 DIAGNOSIS — G89.29 CHRONIC MIDLINE THORACIC BACK PAIN: Primary | ICD-10-CM

## 2025-03-26 DIAGNOSIS — R13.10 DYSPHAGIA, UNSPECIFIED TYPE: ICD-10-CM

## 2025-03-26 DIAGNOSIS — R07.89 OTHER CHEST PAIN: ICD-10-CM

## 2025-03-26 DIAGNOSIS — Z23 NEED FOR VACCINATION: ICD-10-CM

## 2025-03-26 DIAGNOSIS — M54.6 CHRONIC MIDLINE THORACIC BACK PAIN: Primary | ICD-10-CM

## 2025-03-26 ASSESSMENT — PATIENT HEALTH QUESTIONNAIRE - PHQ9: CLINICAL INTERPRETATION OF PHQ2 SCORE: 0

## 2025-03-26 ASSESSMENT — FIBROSIS 4 INDEX: FIB4 SCORE: 2.27

## 2025-03-26 NOTE — PROGRESS NOTES
Verbal consent was acquired by the patient to use [employer] ambient listening note generation during this visit     Subjective:     HPI:   History of Present Illness  The patient presents for evaluation of chest pain, back pain, and dysphagia.    Chest Pain  - Reports experiencing heartburn, a symptom she has not previously encountered, described as a burning sensation in the center of her chest  - Symptom present for several weeks  - Pain is not sharp and does not radiate upwards into the throat  - Intermittent pain, worsening gradually throughout the day, particularly after dinner, and subsides by morning  - Rarely consumes alcohol, approximately 2 half glasses per month  - Pain is not associated with shortness of breath, excessive sweating, or physical exertion  - Maintains an active lifestyle, including regular exercise due to Parkinson's disease  - Reports occasional hot flashes at night, attributed to menopause  - Long-standing issue of mucus accumulation in throat when lying down, necessitating an upright position for sleep  - Symptom worsened following stomach surgery a year ago  - Experiences occasional coughing and constant throat clearing    Back Pain  - Experiencing back pain for several months, initially attributed to muscular causes  - Pain located slightly below the shoulder blades on either side of the spine  - Pain has become more constant over the past 2 to 3 months  - Describes pain as a constant ache, not exacerbated or relieved by movement  - No numbness or tingling in arms or legs, except for tingling in feet due to dystonia  - No unusual weakness in arms or legs, fevers, or back trauma  - Found relief from shoulder pain through rock climbing  - Has not discussed back pain with Parkinson's doctor, whom she last saw 5 months ago and has an appointment with next month  - Discontinued use of Gait's peptic support and other related products  - Managing pain with Advil or Tylenol before bed,  providing some relief upon waking and moving around    Dysphagia  - Continues to experience difficulty swallowing, with increasing frequency of pills becoming lodged in throat  - Prescribed famotidine, initially once daily and then increased to twice daily, but reports no improvement in symptoms  - Underwent upper endoscopy in 11/2021, which revealed no abnormalities  - Subsequent barium swallow test indicated a lump suggestive of GERD  - Reduced famotidine dosage to once daily due to concerns about potential interactions with Parkinson's and thyroid medications, as well as feelings of heaviness in stomach and slow digestion    Supplemental information: She has not yet scheduled her mammogram or neck MRI. She is due for a pneumonia vaccine to finish out her series. She has had measles twice.    SOCIAL HISTORY  She rarely drinks alcohol, consuming maybe 2 half glasses a month.    MEDICATIONS  Current: famotidine, levothyroxine, Sinemet    IMMUNIZATIONS  She is due for a pneumonia vaccine to finish out her series.    Health Maintenance: Completed    Objective:     Exam:  /62 (BP Location: Left arm, Patient Position: Sitting, BP Cuff Size: Adult)   Pulse 95   Temp 36.3 °C (97.3 °F) (Temporal)   Ht 1.524 m (5')   Wt 48.2 kg (106 lb 3.2 oz)   SpO2 96%   BMI 20.74 kg/m²  Body mass index is 20.74 kg/m².    Physical Exam  Constitutional:       Appearance: Normal appearance.   Cardiovascular:      Rate and Rhythm: Normal rate and regular rhythm.      Heart sounds: Normal heart sounds.   Pulmonary:      Effort: Pulmonary effort is normal.      Breath sounds: Normal breath sounds.   Chest:       Musculoskeletal:      Cervical back: Normal range of motion and neck supple.        Back:    Lymphadenopathy:      Cervical: No cervical adenopathy.   Neurological:      Mental Status: She is alert.             Results      Assessment & Plan:     1. Chronic midline thoracic back pain        2. Other chest pain        3.  Dysphagia, unspecified type  Referral to Gastroenterology      4. Need for vaccination  Pneumococcal Conjugate Vaccine 20-Valent (6 wks+)          Assessment & Plan  1. Chest pain: Acute. Intermittent in the last few weeks. Likely related to acid reflux and costochondritis.  - Increase famotidine to 20 mg twice daily.  - Over-the-counter medications, ice, heat, and time for costochondritis management.    2. Thoracic back pain: Chronic, stable.  Present for an indeterminate amount of time but no more than 6 months.  She is tender in the paraspinals on the left side in the area of concern so I suspect this is musculoskeletal in origin.  -Prescription of home exercises provided.    3. Dysphagia: Chronic, worsening.  She is only been taking famotidine once daily and she is having trouble swallowing pills again.  - Increase famotidine to 20 mg twice daily.  - Referral to [facility] for further evaluation if symptoms persist.  -She did have a swallow study performed but there are no results in the system yet    4. Health maintenance.  -Prevnar 20 administered today    Referral for genetic research was offered. Patient is a participant.    Return if symptoms worsen or fail to improve.    Please note that this dictation was created using voice recognition software. I have made every reasonable attempt to correct obvious errors, but I expect that there are errors of grammar and possibly content that I did not discover before finalizing the note.

## 2025-03-28 NOTE — Clinical Note
REFERRAL APPROVAL NOTICE         Sent on March 28, 2025                   Mariela Ellisworth  [address]  [place] [address]                   Dear Ms. Huff,    After a careful review of the medical information and benefit coverage, [facility] has processed your referral. See below for additional details.    If applicable, you must be actively enrolled with your insurance for coverage of the authorized service. If you have any questions regarding your coverage, please contact your insurance directly.    REFERRAL INFORMATION   Referral #:  [id number]  Referred-To Provider    Referred-By Provider:  Gastroenterology    Abi Bragg M.D.   [facility]      [address] [id number] [phone] [address] [id number] [phone]    Referral Start Date:  03/26/2025  Referral End Date:   03/26/2026             SCHEDULING  If you do not already have an appointment, please call [phone] to make an appointment.     MORE INFORMATION  If you do not already have a [employer] account, sign up at: [email or web address]  You can access your medical information, make appointments, see lab results, billing information, and more.  If you have questions regarding this referral, please contact  the [facility] Referrals department at:             [phone]. Monday - Friday 8:00AM - 5:00PM.     Sincerely,    [facility]

## 2025-04-18 ENCOUNTER — TELEPHONE (OUTPATIENT)
Dept: NEUROLOGY | Facility: MEDICAL CENTER | Age: 65
End: 2025-04-18
Payer: COMMERCIAL

## 2025-04-22 ENCOUNTER — TELEPHONE (OUTPATIENT)
Dept: NEUROLOGY | Facility: MEDICAL CENTER | Age: 65
End: 2025-04-22
Payer: COMMERCIAL

## 2025-04-22 ENCOUNTER — PHARMACY VISIT (OUTPATIENT)
Dept: PHARMACY | Facility: MEDICAL CENTER | Age: 65
End: 2025-04-22
Payer: COMMERCIAL

## 2025-04-22 ENCOUNTER — OFFICE VISIT (OUTPATIENT)
Dept: NEUROLOGY | Facility: MEDICAL CENTER | Age: 65
End: 2025-04-22
Attending: INTERNAL MEDICINE
Payer: COMMERCIAL

## 2025-04-22 VITALS
RESPIRATION RATE: 16 BRPM | WEIGHT: 107.36 LBS | HEIGHT: 60 IN | TEMPERATURE: 97.8 F | BODY MASS INDEX: 21.08 KG/M2 | DIASTOLIC BLOOD PRESSURE: 68 MMHG | HEART RATE: 75 BPM | SYSTOLIC BLOOD PRESSURE: 102 MMHG | OXYGEN SATURATION: 98 %

## 2025-04-22 DIAGNOSIS — J31.0 CHRONIC RHINITIS: ICD-10-CM

## 2025-04-22 DIAGNOSIS — G20.A2 PARKINSON'S DISEASE WITHOUT DYSKINESIA, WITH FLUCTUATING MANIFESTATIONS (HCC): ICD-10-CM

## 2025-04-22 PROCEDURE — RXMED WILLOW AMBULATORY MEDICATION CHARGE: Performed by: INTERNAL MEDICINE

## 2025-04-22 PROCEDURE — 99212 OFFICE O/P EST SF 10 MIN: CPT | Performed by: INTERNAL MEDICINE

## 2025-04-22 RX ORDER — CARBIDOPA LEVODOPA 25; 100 MG/1; MG/1
1 TABLET ORAL 4 TIMES DAILY
Qty: 360 TABLET | Refills: 3 | Status: SHIPPED | OUTPATIENT
Start: 2025-04-22 | End: 2026-04-22

## 2025-04-22 RX ORDER — ISTRADEFYLLINE 40 MG/1
1 TABLET, FILM COATED ORAL DAILY
Qty: 30 TABLET | Refills: 0 | Status: SHIPPED | OUTPATIENT
Start: 2025-04-22 | End: 2025-05-22

## 2025-04-22 RX ORDER — ISTRADEFYLLINE 40 MG/1
1 TABLET, FILM COATED ORAL DAILY
Qty: 90 TABLET | Refills: 3 | Status: SHIPPED | OUTPATIENT
Start: 2025-04-22 | End: 2026-04-22

## 2025-04-22 RX ORDER — CARBIDOPA AND LEVODOPA 25; 100 MG/1; MG/1
1 TABLET ORAL 3 TIMES DAILY
Qty: 270 TABLET | Refills: 3 | Status: CANCELLED | OUTPATIENT
Start: 2025-04-22 | End: 2026-04-17

## 2025-04-22 RX ORDER — AZELASTINE HYDROCHLORIDE 137 UG/1
2 SPRAY, METERED NASAL DAILY
Qty: 120 ML | Refills: 3 | Status: SHIPPED | OUTPATIENT
Start: 2025-04-22

## 2025-04-22 ASSESSMENT — PATIENT HEALTH QUESTIONNAIRE - PHQ9: CLINICAL INTERPRETATION OF PHQ2 SCORE: 0

## 2025-04-22 ASSESSMENT — FIBROSIS 4 INDEX: FIB4 SCORE: 2.27

## 2025-04-22 NOTE — TELEPHONE ENCOUNTER
Received [employer] request via [employer]  for Istradefylline (NOURIANZ) 40 MG Tab . (Quantity:90, Day Supply:90)     Insurance: [employer] [place] [employer]  Member ID:  [id number]  BIN: [id number]  PCN: [id number]  Group: [id number]     Ran Test claim via [place] & medication Rejects stating prior authorization is required.

## 2025-04-22 NOTE — TELEPHONE ENCOUNTER
Prior Authorization for CALLY  has been approved for a quantity of 90 , day supply 90    Prior Authorization reference number: [id number]  Insurance: [employer] [place] [employer]   Effective dates: 03/23/2025 - 04/22/2026  Copay: na     Is patient eligible to fill with [facility]? No, test claim rejected stating Pharmacy Lockout.

## 2025-04-22 NOTE — PROGRESS NOTES
Pasquale Santiago, DO  Neurology, Movement Disorders [facility]  [address], [address]. [place] [address]  Phone: [phone], Fax: [phone]     ASSESSMENT / PLAN   Mariela Huff is a 63 y.o. woman with Parkinson's disease     Parkinson's disease  Bilateral foot dystonia  Dopamine responsive foot dystonia, but having frequent wearing off and inconsistent benefit despite complex regimen. LEDD 513mg     Continue  - carbidopa/levodopa 25/100 and trihexyphenydyl 2mg as follows:  6:30 AM C/L one-tab   7:30 AM Mucuna (400mg, ~60mg L-dopa) + trihexyphenidyl half-tablet   10:30 AM  C/L one-tab + mucuna   1:30 PM  C/L one-tab + trihexyphenidyl half-tablet   5 PM C/L half-tab + mucuna   8:30 PM  Mucuna + trihexyphenidyl half-tablet   - replace carbidopa-levodopa with Dhivy if available  - Nourianz (istradefylline): once daily (sample sent to [facility])  - [employer] to look up supplement    Discussed   - Crexont (carbidopa-levodopa ER)  - Inbrijia inhalation levodopa     Left hip pain  Possible compensation of left foot dystonia leading to hip pain as it worsens as the day progresses  - physical therapy referral     Sinus drainage  - Start Azelastine nasal spray  - Zyrtec, Allergra, Claritin: less sedating or side effects     Tension headache  Palpable tenderness in mid suboccipital region, worse when supine. Onset after straining neck working on car. Better recently.  - naproxen 200mg PRN nightly      Orders Placed This Encounter    Istradefylline (NOURIANZ) 40 MG Tab    Istradefylline (NOURIANZ) 40 MG Tab    Azelastine (ASTELIN) 137 MCG/SPRAY Solution    DHIVY  MG Tab     Return in about 4 months (around 8/22/2025).    BILLING DOCUMENTATION:   Excluding time spent on procedures during visit, I spent 40 minutes reviewing the medical record, interviewing and examining the patient, discussing diagnosis and treatment, and coordinating care.              HISTORY OF PRESENT ILLNESS    Mariela Huff is a 63 y.o. adult with Parkinson's disease since 2017 with OFF toe curling dystonia s/p botox.       Initial HPI  Per Dr. Calvillo's note 5/2018:  [facility] confirm the presence of suspected Parkinson's disease. I reviewed the reports both from the examining physician as well as diagnostic testing. The latter included Amelia testing showing asymmetric dopamine transporter activity in the putamen bilaterally, left greater than right, as well as neurophysiology movement evaluation with EMG and EEG correlate, revealing increased tone on the right with an action-induced tremor as well as resting tremor consistent with Parkinson's disease, without EEG correlate consistent with nonepileptic involuntary movements. Blood work including Lyme titer, heavy metal screen, vasculitis screens, etc., all were negative/normal.      [facility] 9/2021 Dr. Ajit Figueroa:   right foot cramping began in 2010, but more definitively she developed right hand rest tremor in 2016, with progression to development of micrographia, loss of dexterity R > L, and unsteadiness by 2018      3/2021 injection pattern:  Right flexor digitorum longus 22.5 units +++  Right flexor hallucis longus 15 units ++  Right flexor digitorum brevis 7.5 units N/a no EMG used here   Total: 45 units  Wasted: 55 units      9/2021 injection pattern:  Right flexor digitorum longus 30 units +++  Right flexor hallucis longus 20 units ++  Right posterior tibialis 20 ++  Right flexor digitorum brevis 15 units N/a no EMG used here   Total: 85 units  Wasted: 15 units   - reported minimal benefit jeane in morning when dystonia is worst.       Clinical summary  9/2023: Dr. Cam: MRI Brain and MRA Neck due to new onset headaches. No new changes on imaging.   11/16/23: first visit with me. Trial Rytary 95mg, 2 capsules instead of C/L IR+CR but had nausea and brain fog, and incomplete relief of dystonia.  1/2024: stopped C/L CR due to nausea with   change. Started mucuna pruriens (400mg, ~60mg L-dopa)  05/29/24: no med change  10/23/24: no med change  04/22/25: start Nourianz and Dhivy (to replace carbidopa-levodopa tablets)      Current regimen  carbidopa/levodopa 25/100 IR and trihexyphenydyl 2mg as follows:  6:30 AM C/L one-tab   7:30 AM Mucuna (400mg, ~60mg L-dopa) + trihexyphenidyl half-tablet   10:30 AM  C/L one-tab + mucuna   1:30 PM  C/L one-tab + trihexyphenidyl half-tablet   5 PM C/L half-tab + mucuna   8:30 PM  Mucuna + trihexyphenidyl half-tablet   Latency: 45min  Duration: sometimes gait issues mid-day, worsening between 1030-130  Efficacy: helps with bilateral foot dystonia L>R.   Dyskinesia/Dystonia: dyskinesia with right foot twitching  Dystonia: morning worse, better as day goes on.  Sfx: none       ROS:  Gait:   Rock steady boxing  Balance OK, but wearing off leads to hip stiffness  Orthostasis:   no issues aside from rare occasions stretching   Constipation:   no issues  Urinary issues:   no issues  Speech/Swallow:   no voice changes. Swallowing has to be more careful due to sinus drainage.  Cognition:   steady  Mood:   no issues. 04/22/25 PHQ-9 = 0, C-SSRS = neg  Hallucinations: no issues  Vision change seeing vertical diplopia, jeane with lights when driving  Sleep/RBD:   headaches affecting sleep, cats wake her up. Sleeps 10PM - 6:30PM  Stiff and achy at night. Aleve + tylenol helps.   Ongoing sleep issues, wakes up and can't fall back asleep from foot dystonia. Esperanza selzer cold helps with sinus drainage. 04/22/25: Nyquil helps. Saw allergist in past, not contributing factor. ENT evaluation just showed deviated septum.   Headaches:  Tension type headache, u-shaped. Pain described at base of skull. Worse when supine. Takes tylenol and aleve at bedtime. Wakes up at night due to pain. If skip medication, can worsen.   Better now, levodopa helps.   On/off. Headaches approx a few times a week. 04/22/25: related to neck stiffness and  dystonia      Past Medical History:   Diagnosis Date    Anesthesia     severe ponv, hard to wake up    Arthritis     shoulders    Chest tightness     Cough     Diverticulosis 2012    pt stated it was noted on colonoscopy    Hypothyroid     hypothyroid    Infectious disease     around bronchitis/flu 12/2011    Lyme disease     chronic    Pain 04/2018    right foot, bilateral shoulder    Painful breathing     Shortness of breath      Past Surgical History:   Procedure Laterality Date    PB SHLDR ARTHROSCOP,SURG,W/ROTAT CUFF REPB Right 12/14/2022    Procedure: RIGHT SHOULDER ARTHROSCOPY ROTATOR CUFF REPAIR;  Surgeon: Mildred Hu M.D.;  Location: [facility];  Service: Orthopedics    PB REPAIR BICEPS LONG TENDON Right 12/14/2022    Procedure: RIGHT BICEPS TENODESIS;  Surgeon: Mildred Hu M.D.;  Location: [facility];  Service: Orthopedics    NV SHLDR ARTHROSCOP,PART ACROMIOPLAS Right 12/14/2022    Procedure: RIGHT SUBACROMIAL DECOMPRESSION, LABRAL DEBRIDEMENT, REPAIRS AS INDICATED;  Surgeon: Mildred Hu M.D.;  Location: [facility];  Service: Orthopedics    TOE FUSION Right 5/7/2018    Procedure: TOE FUSION - 1ST MTP;  Surgeon: Janes Benavidez M.D.;  Location: SURGERY SAME DAY [facility];  Service: Orthopedics    REPAIR, TENDON, LOWER EXTREMITY, USING TENDON GRAFT Right 5/7/2018    Procedure: FLEXOR TENDON REPAIR - 4TH RELEASE W/SOFT TISSUE RELEASE 2/3;  Surgeon: Janes Benavidez M.D.;  Location: SURGERY SAME DAY [facility];  Service: Orthopedics    HARDWARE REMOVAL ORTHO Right 5/7/2018    Procedure: HARDWARE REMOVAL ORTHO - INTERNAL FIXATION;  Surgeon: Janes Benavidez M.D.;  Location: SURGERY SAME DAY [facility];  Service: Orthopedics    ORTHOPEDIC OSTEOTOMY Right 5/7/2018    Procedure: ORTHOPEDIC OSTEOTOMY - DISTAL METATARSAL 2/3/4;  Surgeon: Janes Benavidez M.D.;  Location: SURGERY SAME DAY [facility];  Service: Orthopedics     BUNIONECTOMY Right 5/1/2017    Procedure: BUNIONECTOMY - PROXIMAL HALLUX VALGUS CORRECTION W/BONE GRAFT;  Surgeon: Janes Benavidez M.D.;  Location: SURGERY [facility];  Service:     NEUROMA EXCISION  5/1/2017    Procedure: NEUROMA EXCISION - 2ND WEBSPACE;  Surgeon: Janes Benavidez M.D.;  Location: SURGERY [facility];  Service:     BREAST IMPLANT REVISION Bilateral 4/11/2016    Procedure: BREAST IMPLANT EXCHANGE OF SALINE TO SILICONE W/REPOSITIONING OF LATERAL FOLDS;  Surgeon: Mikey Adkins M.D.;  Location: SURGERY [facility];  Service:     BREAST BIOPSY  1/24/2012    Performed by SANDRA ESTRADA at SURGERY SAME DAY [facility]    SHOULDER ARTHROSCOPY  12/1/2010    Performed by KATE CHANCE at SURGERY [facility]    SHOULDER DECOMPRESSION  12/1/2010    Performed by KATE CHANCE at SURGERY [facility]    ROTATOR CUFF REPAIR  12/1/2010    Performed by KATE CHANCE at SURGERY [facility]    SHOULDER DECOMPRESSION ARTHROSCOPIC  5/13/2009    Performed by KATE CHANCE at SURGERY [facility]    ROTATOR CUFF REPAIR  5/13/2009    Performed by KATE CHANCE at SURGERY [facility]    HYSTERECTOMY, VAGINAL  1999    fibroids    CO BREAST AUGMENTATION WITH IMPLANT Bilateral 1999    OTHER ORTHOPEDIC SURGERY  1993    joint repair left thumb    US-NEEDLE CORE BX-BREAST PANEL       Family History   Problem Relation Age of Onset    Diabetes Mother     Thyroid Mother     Glasses Mother     Other Problem (macular degeneration) Mother     Glasses Father     Diabetes Father     Thyroid Sister     Cancer Paternal Aunt         breast    Cancer Paternal Aunt         breast, uterine    Cancer Paternal Aunt         bladder    Cancer Paternal Aunt         colon    Thyroid Daughter     Heart Disease Neg Hx     Stroke Neg Hx     Alcohol/Drug Neg Hx      Social History     Socioeconomic History    Marital status:      Spouse name: Not on file    Number of children: Not on file    Years  of education: Not on file    Highest education level: Bachelor's degree (e.g., BA, AB, BS)   Occupational History    Not on file   Tobacco Use    Smoking status: Never    Smokeless tobacco: Never   Vaping Use    Vaping status: Never Used   Substance and Sexual Activity    Alcohol use: Yes     Alcohol/week: 0.6 oz     Types: 1 Glasses of wine per week     Comment: Occasionally    Drug use: No    Sexual activity: Yes     Partners: Male   Other Topics Concern    Not on file   Social History Narrative    Retired     Social Drivers of Health     Financial Resource Strain: Low Risk  (1/12/2021)    Overall Financial Resource Strain (CARDIA)     Difficulty of Paying Living Expenses: Not hard at all   Food Insecurity: No Food Insecurity (1/12/2021)    Hunger Vital Sign     Worried About Running Out of Food in the Last Year: Never true     Ran Out of Food in the Last Year: Never true   Transportation Needs: No Transportation Needs (1/12/2021)    PRAPARE - Transportation     Lack of Transportation (Medical): No     Lack of Transportation (Non-Medical): No   Physical Activity: Sufficiently Active (1/12/2021)    Exercise Vital Sign     Days of Exercise per Week: 5 days     Minutes of Exercise per Session: 50 min   Stress: No Stress Concern Present (1/12/2021)    Citizen of [place] of Occupational Health - Occupational Stress Questionnaire     Feeling of Stress : Not at all   Social Connections: Unknown (1/12/2021)    Social Connection and Isolation Panel [NHANES]     Frequency of Communication with Friends and Family: More than three times a week     Frequency of Social Gatherings with Friends and Family: Once a week     Attends Faith Services: Patient declined     Active Member of Clubs or Organizations: Yes     Attends Club or Organization Meetings: More than 4 times per year     Marital Status:    Intimate Partner Violence: Not on file   Housing Stability: Low Risk  (1/12/2021)    Housing Stability Vital Sign      Unable to Pay for Housing in the Last Year: No     Number of Places Lived in the Last Year: 1     Unstable Housing in the Last Year: No     Current Outpatient Medications   Medication    Istradefylline (NOURIANZ) 40 MG Tab    Istradefylline (NOURIANZ) 40 MG Tab    Azelastine (ASTELIN) 137 MCG/SPRAY Solution    DHIVY  MG Tab    trihexyphenidyl (ARTANE) 2 MG Tab    levothyroxine (SYNTHROID) 75 MCG Tab    carbidopa-levodopa (SINEMET)  MG Tab    famotidine (PEPCID) 20 MG Tab    Cyanocobalamin (B-12) 3000 MCG SL Tab    Ascorbic Acid (VITAMIN C) 500 MG Chew Tab    Cholecalciferol (VITAMIN D) 50 MCG (2000 UT) Cap     No current facility-administered medications for this visit.     Allergies   Allergen Reactions    Pcn [Penicillins] Rash     Rash      Percocet [Oxycodone-Acetaminophen] Vomiting    Symbicort [Budesonide-Formoterol Fumarate]      Laryngitis              DATA / RESULTS     9/2023:  MRI Brain  Unremarkable noncontrast MR examination of the brain except for a punctate left frontal subcortical T2 hyperintensity of no clinical significance.      MRA Neck  There is no flow-limiting stenosis in the visualized extracranial neck arteries.    25-Hydroxy   Vitamin D 25   Date Value Ref Range Status   11/15/2024 54 30 - 100 ng/mL Final     Comment:     Adult Ranges:   <20 ng/mL - Deficiency  20-29 ng/mL - Insufficiency   ng/mL - Sufficiency  Electrochemiluminescence binding assay performed using Roche gabriela e  immunoassay analyzer.  The [employer] Vitamin D total II assay is intended for  the quantitative determination of total 25 hydroxyvitamin D in human serum  and plasma. This assay is to be used as an aid in the assessment of vitamin  D sufficiency in adults.       Vitamin B12 -True Cobalamin   Date Value Ref Range Status   09/26/2024 >4000 (H) 211 - 911 pg/mL Final     Comment:     Results obtained by dilution.     TSH   Date Value Ref Range Status   11/15/2024 0.917 0.350 - 5.500 uIU/mL Final     HIV  1/0/2   Date Value Ref Range Status   11/24/2010 see below Non Reactive Final     Comment:     Non Reactive:  Screen for Enhanced HIV 1/O/2 is NEGATIVE for  HIV-1, including group O, and HIV-2 antibodies.  A negative  test result at any point in the investigation of individual  subjects does not preclude the possibility of exposure to or  infection with HIV 1/O/2.     Glycohemoglobin   Date Value Ref Range Status   09/26/2024 5.8 (H) 4.0 - 5.6 % Final     Comment:     Increased risk for diabetes:  5.7 -6.4%  Diabetes:  >6.4%  Glycemic control for adults with diabetes:  <7.0%    The above interpretations are per ADA guidelines.  Diagnosis  of diabetes mellitus on the basis of elevated Hemoglobin A1c  should be confirmed by repeating the Hb A1c test.       LDL   Date Value Ref Range Status   12/07/2023 71 <100 mg/dL Final                OBJECTIVE      Vitals:    04/22/25 1457   BP: 102/68   BP Location: Left arm   Patient Position: Sitting   BP Cuff Size: Adult   Pulse: 75   Resp: 16   Temp: 36.6 °C (97.8 °F)   TempSrc: Temporal   SpO2: 98%   Weight: 48.7 kg (107 lb 5.8 oz)   Height: 1.524 m (5')         Physical Exam   Last dose of C/L taken 2PM (1.5 hour ago), feels ON      UPDRS Right Left   Finger tapping 0  0   Hand Movement 0  0   Toe Tapping 0 0   Leg Agility 0 better 0   Rigidity 1 better 1   Rest Tremor 0 0   Postural Tremor 1 1   Kinetic Tremor 1 1      Cerebellar: No dysmetria with FTN     Gait:   Posture - normal   Base - narrow   Stride length - normal (better)  Arm swing - normal (better)  Speed - normal  Shuffling/freezing - none  U-Turn - normal           PROCEDURE     N/A

## 2025-04-22 NOTE — TELEPHONE ENCOUNTER
Prior Authorization for NOURIANZ  (Quantity: 90, Days: 90) has been submitted via Cover My Meds: Key ([id number])    Insurance: [employer]     Will follow up in 24-48 business hours.

## 2025-04-23 NOTE — PATIENT INSTRUCTIONS
- replace carbidopa-levodopa with Dhivy if available  - Nourianz (istradefylline): once daily (sample sent to [facility])  - [email or web address] to look up supplement

## 2025-06-04 ENCOUNTER — APPOINTMENT (OUTPATIENT)
Dept: MEDICAL GROUP | Facility: PHYSICIAN GROUP | Age: 65
End: 2025-06-04
Payer: COMMERCIAL

## 2025-08-11 ENCOUNTER — APPOINTMENT (OUTPATIENT)
Dept: URBAN - METROPOLITAN AREA CLINIC 6 | Facility: CLINIC | Age: 65
Setting detail: DERMATOLOGY
End: 2025-08-11

## 2025-08-11 DIAGNOSIS — L81.4 OTHER MELANIN HYPERPIGMENTATION: ICD-10-CM

## 2025-08-11 DIAGNOSIS — L82.0 INFLAMED SEBORRHEIC KERATOSIS: ICD-10-CM

## 2025-08-11 DIAGNOSIS — D22 MELANOCYTIC NEVI: ICD-10-CM

## 2025-08-11 DIAGNOSIS — L98.8 OTHER SPECIFIED DISORDERS OF THE SKIN AND SUBCUTANEOUS TISSUE: ICD-10-CM

## 2025-08-11 DIAGNOSIS — Z71.89 OTHER SPECIFIED COUNSELING: ICD-10-CM

## 2025-08-11 DIAGNOSIS — D18.0 HEMANGIOMA: ICD-10-CM

## 2025-08-11 DIAGNOSIS — L82.1 OTHER SEBORRHEIC KERATOSIS: ICD-10-CM

## 2025-08-11 DIAGNOSIS — D485 NEOPLASM OF UNCERTAIN BEHAVIOR OF SKIN: ICD-10-CM

## 2025-08-11 PROBLEM — D18.01 HEMANGIOMA OF SKIN AND SUBCUTANEOUS TISSUE: Status: ACTIVE | Noted: 2025-08-11

## 2025-08-11 PROBLEM — D22.39 MELANOCYTIC NEVI OF OTHER PARTS OF FACE: Status: ACTIVE | Noted: 2025-08-11

## 2025-08-11 PROBLEM — D48.5 NEOPLASM OF UNCERTAIN BEHAVIOR OF SKIN: Status: ACTIVE | Noted: 2025-08-11

## 2025-08-11 PROBLEM — D22.5 MELANOCYTIC NEVI OF TRUNK: Status: ACTIVE | Noted: 2025-08-11

## 2025-08-11 PROCEDURE — ? COUNSELING

## 2025-08-11 PROCEDURE — ? BIOPSY BY SHAVE METHOD

## 2025-08-11 PROCEDURE — ? LIQUID NITROGEN

## 2025-08-11 ASSESSMENT — LOCATION SIMPLE DESCRIPTION DERM
LOCATION SIMPLE: UPPER BACK
LOCATION SIMPLE: RIGHT HAND
LOCATION SIMPLE: LEFT HAND
LOCATION SIMPLE: ABDOMEN
LOCATION SIMPLE: RIGHT CALF
LOCATION SIMPLE: RIGHT POSTERIOR UPPER ARM
LOCATION SIMPLE: LEFT FOREHEAD
LOCATION SIMPLE: RIGHT LIP
LOCATION SIMPLE: CHEST
LOCATION SIMPLE: RIGHT THIGH
LOCATION SIMPLE: LEFT UPPER BACK
LOCATION SIMPLE: CHIN

## 2025-08-11 ASSESSMENT — LOCATION DETAILED DESCRIPTION DERM
LOCATION DETAILED: RIGHT RIB CAGE
LOCATION DETAILED: RIGHT INFERIOR VERMILION LIP
LOCATION DETAILED: LEFT INFERIOR LATERAL UPPER BACK
LOCATION DETAILED: EPIGASTRIC SKIN
LOCATION DETAILED: RIGHT PROXIMAL CALF
LOCATION DETAILED: PERIUMBILICAL SKIN
LOCATION DETAILED: RIGHT DISTAL POSTERIOR UPPER ARM
LOCATION DETAILED: LEFT MID-UPPER BACK
LOCATION DETAILED: RIGHT MENTAL CREASE
LOCATION DETAILED: RIGHT ANTERIOR PROXIMAL THIGH
LOCATION DETAILED: LEFT INFERIOR FOREHEAD
LOCATION DETAILED: LEFT INFERIOR UPPER BACK
LOCATION DETAILED: MIDDLE STERNUM
LOCATION DETAILED: LEFT RADIAL DORSAL HAND
LOCATION DETAILED: RIGHT RADIAL DORSAL HAND
LOCATION DETAILED: INFERIOR THORACIC SPINE

## 2025-08-11 ASSESSMENT — LOCATION ZONE DERM
LOCATION ZONE: HAND
LOCATION ZONE: TRUNK
LOCATION ZONE: ARM
LOCATION ZONE: LIP
LOCATION ZONE: LEG
LOCATION ZONE: FACE

## 2025-08-28 ENCOUNTER — TELEPHONE (OUTPATIENT)
Dept: NEUROLOGY | Facility: MEDICAL CENTER | Age: 65
End: 2025-08-28
Payer: COMMERCIAL

## (undated) DEVICE — GLOVE BIOGEL SZ 6.5 SURGICAL PF LTX (50PR/BX 4BX/CA)

## (undated) DEVICE — SET LEADWIRE 5 LEAD BEDSIDE DISPOSABLE ECG (1SET OF 5/EA)

## (undated) DEVICE — KIT ANESTHESIA W/CIRCUIT & 3/LT BAG W/FILTER (20EA/CA)

## (undated) DEVICE — ELECTRODE DUAL RETURN W/ CORD - (50/PK)

## (undated) DEVICE — LEAD SET 6 DISP. EKG NIHON KOHDEN (100EA/CA) [9859].

## (undated) DEVICE — DRESSING 3X8 ADAPTIC GAUZE - NON-ADHERING (36/PK 6PK/BX)

## (undated) DEVICE — Device

## (undated) DEVICE — GLOVE BIOGEL PI INDICATOR SZ 7.0 SURGICAL PF LF - (50/BX 4BX/CA)

## (undated) DEVICE — BANDAGE ELASTIC 6 HONEYCOMB - 6X5YD LF (20/CA)"

## (undated) DEVICE — KIT ROOM DECONTAMINATION

## (undated) DEVICE — GLOVE BIOGEL SZ 7.5 SURGICAL PF LTX - (50PR/BX 4BX/CA)

## (undated) DEVICE — MASK AIRWAY SIZE 3 UNIQUE SILICON (10/BX)

## (undated) DEVICE — SUTURE 3-0 VICRYL PLUS SH - 8X 18 INCH (12/BX)

## (undated) DEVICE — LACTATED RINGERS INJ 1000 ML - (14EA/CA 60CA/PF)

## (undated) DEVICE — GLOVE BIOGEL ECLIPSE  PF LATEX SIZE 6.5 (50PR/BX)

## (undated) DEVICE — SENSOR SPO2 NEO LNCS ADHESIVE (20/BX) SEE USER NOTES

## (undated) DEVICE — SLEEVE, VASO, THIGH, MED

## (undated) DEVICE — BANDAGE ELASTIC 4 HONEYCOMB - 4"X5YD LF (20/CA)"

## (undated) DEVICE — GLOVE SZ 7 BIOGEL PI MICRO - PF LF (50PR/BX 4BX/CA)

## (undated) DEVICE — PACK LOWER EXTREMITY - (2/CA)

## (undated) DEVICE — GOWN WARMING STANDARD FLEX - (30/CA)

## (undated) DEVICE — CHLORAPREP 26 ML APPLICATOR - ORANGE TINT(25/CA)

## (undated) DEVICE — PADDING CAST 4 IN STERILE - 4 X 4 YDS (24/CA)

## (undated) DEVICE — GLOVE BIOGEL INDICATOR SZ 6.5 SURGICAL PF LTX - (50PR/BX 4BX/CA)

## (undated) DEVICE — GLOVE BIOGEL PI INDICATOR SZ 8.0 SURGICAL PF LF -(50/BX 4BX/CA)

## (undated) DEVICE — SUTURE 3-0 ETHILON PS-1 (36PK/BX)

## (undated) DEVICE — SET EXTENSION WITH 2 PORTS (48EA/CA) ***PART #2C8610 IS A SUBSTITUTE*****

## (undated) DEVICE — CANISTER SUCTION 3000ML MECHANICAL FILTER AUTO SHUTOFF MEDI-VAC NONSTERILE LF DISP  (40EA/CA)

## (undated) DEVICE — SUCTION INSTRUMENT YANKAUER BULBOUS TIP W/O VENT (50EA/CA)

## (undated) DEVICE — BLOCK

## (undated) DEVICE — GOWN SURGEONS X-LARGE - DISP. (30/CA)

## (undated) DEVICE — MASK ANESTHESIA ADULT  - (100/CA)

## (undated) DEVICE — HEAD HOLDER JUNIOR/ADULT

## (undated) DEVICE — STOCKINET BIAS 6 IN STERILE - (20/CA)

## (undated) DEVICE — SODIUM CHL IRRIGATION 0.9% 1000ML (12EA/CA)

## (undated) DEVICE — TUBING CLEARLINK DUO-VENT - C-FLO (48EA/CA)

## (undated) DEVICE — TOURNIQUET CUFF 24 X 4 ONE PORT - STERILE (10/BX)

## (undated) DEVICE — BLADE SURGICAL #15 - (50/BX 3BX/CA)

## (undated) DEVICE — SUTURE GENERAL

## (undated) DEVICE — GLOVE BIOGEL INDICATOR SZ 7.5 SURGICAL PF LTX - (50PR/BX 4BX/CA)

## (undated) DEVICE — DRESSING 3X3 ADAPTIC GAUZE - (50EA/CT)

## (undated) DEVICE — GLOVE BIOGEL ECLIPSE PF LATEX SIZE 8.0  (50PR/BX)

## (undated) DEVICE — SUTURE 0 VICRYL PLUS CT-2 - 8 X 18 INCH (12/BX)

## (undated) DEVICE — ELECTRODE 850 FOAM ADHESIVE - HYDROGEL RADIOTRNSPRNT (50/PK)

## (undated) DEVICE — PROTECTOR ULNA NERVE - (36PR/CA)

## (undated) DEVICE — K-WIRE, 1.6MM

## (undated) DEVICE — NEPTUNE 4 PORT MANIFOLD - (20/PK)

## (undated) DEVICE — SUTURE 3-0 ETHILON FS-1 - (36/BX) 30 INCH

## (undated) DEVICE — DRAPE LARGE 3 QUARTER - (20/CA)

## (undated) DEVICE — DRESSING ABDOMINAL PAD STERILE 8 X 10" (360EA/CA)"

## (undated) DEVICE — DRAPE C-ARM LARGE 41IN X 74 IN - (10/BX 2BX/CA)